# Patient Record
Sex: FEMALE | Race: WHITE | NOT HISPANIC OR LATINO | Employment: PART TIME | URBAN - METROPOLITAN AREA
[De-identification: names, ages, dates, MRNs, and addresses within clinical notes are randomized per-mention and may not be internally consistent; named-entity substitution may affect disease eponyms.]

---

## 2017-02-01 DIAGNOSIS — E03.4 HYPOTHYROIDISM DUE TO ACQUIRED ATROPHY OF THYROID: ICD-10-CM

## 2017-02-01 DIAGNOSIS — E03.9 HYPOTHYROIDISM IN PREGNANCY, FIRST TRIMESTER: ICD-10-CM

## 2017-02-01 DIAGNOSIS — O99.281 HYPOTHYROIDISM IN PREGNANCY, FIRST TRIMESTER: ICD-10-CM

## 2017-02-01 DIAGNOSIS — E03.9 HYPOTHYROIDISM IN PREGNANCY, SECOND TRIMESTER: ICD-10-CM

## 2017-02-01 DIAGNOSIS — O99.282 HYPOTHYROIDISM IN PREGNANCY, SECOND TRIMESTER: ICD-10-CM

## 2017-02-01 PROCEDURE — 84439 ASSAY OF FREE THYROXINE: CPT | Performed by: FAMILY MEDICINE

## 2017-02-01 PROCEDURE — 84443 ASSAY THYROID STIM HORMONE: CPT | Performed by: FAMILY MEDICINE

## 2017-02-01 PROCEDURE — 36415 COLL VENOUS BLD VENIPUNCTURE: CPT | Performed by: FAMILY MEDICINE

## 2017-02-02 LAB
T4 FREE SERPL-MCNC: 0.94 NG/DL (ref 0.76–1.46)
TSH SERPL DL<=0.05 MIU/L-ACNC: 0.87 MU/L (ref 0.4–4)

## 2017-02-09 NOTE — PROGRESS NOTES
Quick Note:    Bella,  Your thyroid levels look good, please continue levothyroxine 125 mcg/day.  We will probably need to go back to your previous, pre-pregnancy levothyroxine dose after you deliver.  Can you schedule an appointment with me before you have the baby to discuss the changes?  Let me know if that doesn't work for you.  Karolyn Martines NP  Endocrinology    ______

## 2017-02-27 DIAGNOSIS — E03.4 HYPOTHYROIDISM DUE TO ACQUIRED ATROPHY OF THYROID: ICD-10-CM

## 2017-02-27 RX ORDER — LEVOTHYROXINE SODIUM 125 UG/1
TABLET ORAL
Qty: 90 TABLET | Refills: 0 | Status: SHIPPED | OUTPATIENT
Start: 2017-02-27 | End: 2017-05-09 | Stop reason: DRUGHIGH

## 2017-02-27 NOTE — TELEPHONE ENCOUNTER
levothyroxine (SYNTHROID, LEVOTHROID) 125 MCG tablet     Last Written Prescription Date: 10/24/16  Last Quantity: 90, # refills: 0  Last Office Visit with DEBBIE, MANJEET or [facility] prescribing provider: Conrad 9/14/16        TSH   Date Value Ref Range Status   02/01/2017 0.87 0.40 - 4.00 mU/L Final

## 2017-02-27 NOTE — TELEPHONE ENCOUNTER
Karolyn-see below.  Per 9/14/16 note visit was due 12/14/16.  Please advise.  Shania Stanton RN      Entered by Karolyn Martines APRN CNP at 2/9/2017  5:28 AM   Read by Bella Scott at 2/9/2017  8:23 AM   Bella,   Your thyroid levels look good, please continue levothyroxine 125 mcg/day.   We will probably need to go back to your previous, pre-pregnancy levothyroxine dose after you deliver.   Can you schedule an appointment with me before you have the baby to discuss the changes?   Let me know if that doesn't work for you.   Karolyn Martines NP   Endocrinology        9/14/16  Follow-up:  3 months     Karolyn Martines NP  Endocrinology  [facility]  CC: Osmar Rendon

## 2017-02-27 NOTE — TELEPHONE ENCOUNTER
She still needs the 125 mcg dose for now because she is still pregnant.  I refilled the Rx for a 90-day supply.  Tell her to schedule a follow up appointment before the next refill, she should be about 6 weeks postpartum by then.  Thanks,  Karolyn Martines NP  Endocrinology

## 2017-02-27 NOTE — LETTER
[facility]  [address] [place], [address]  [phone]        February 27, 2017    Bella ALFRED Scott                                                                                                                                  [address]  [facility] [address]            We recently received a call from your pharmacy requesting a refill of Levothyroxine.     A review of your chart indicates that an appointment is required with your provider for follow-up thyroid per Radha Simmons prior to needing next refill. Please call the clinic at [phone] to schedule your appointment.     Taking care of your health is important to us and ongoing visits with your provider are vital to your care.  We look forward to seeing you in the near future.     Sincerely,     [facility]

## 2017-03-14 ENCOUNTER — ANESTHESIA (OUTPATIENT)
Dept: OBGYN | Facility: CLINIC | Age: 32
End: 2017-03-14
Payer: COMMERCIAL

## 2017-03-14 ENCOUNTER — ANESTHESIA EVENT (OUTPATIENT)
Dept: OBGYN | Facility: CLINIC | Age: 32
End: 2017-03-14
Payer: COMMERCIAL

## 2017-03-14 PROBLEM — Z98.891 S/P REPEAT LOW TRANSVERSE C-SECTION: Status: ACTIVE | Noted: 2017-03-14

## 2017-03-14 PROCEDURE — 25800025 ZZH RX 258: Performed by: OBSTETRICS & GYNECOLOGY

## 2017-03-14 PROCEDURE — 25000125 ZZHC RX 250

## 2017-03-14 PROCEDURE — 25000128 H RX IP 250 OP 636: Performed by: OBSTETRICS & GYNECOLOGY

## 2017-03-14 PROCEDURE — 25000125 ZZHC RX 250: Performed by: NURSE ANESTHETIST, CERTIFIED REGISTERED

## 2017-03-14 PROCEDURE — 25000128 H RX IP 250 OP 636: Performed by: NURSE ANESTHETIST, CERTIFIED REGISTERED

## 2017-03-14 RX ORDER — BUPIVACAINE HYDROCHLORIDE 7.5 MG/ML
INJECTION, SOLUTION INTRASPINAL PRN
Status: DISCONTINUED | OUTPATIENT
Start: 2017-03-14 | End: 2017-03-14

## 2017-03-14 RX ORDER — EPHEDRINE SULFATE 50 MG/ML
INJECTION, SOLUTION INTRAMUSCULAR; INTRAVENOUS; SUBCUTANEOUS PRN
Status: DISCONTINUED | OUTPATIENT
Start: 2017-03-14 | End: 2017-03-14

## 2017-03-14 RX ORDER — MORPHINE SULFATE 1 MG/ML
INJECTION, SOLUTION EPIDURAL; INTRATHECAL; INTRAVENOUS PRN
Status: DISCONTINUED | OUTPATIENT
Start: 2017-03-14 | End: 2017-03-14

## 2017-03-14 RX ADMIN — Medication: at 09:33

## 2017-03-14 RX ADMIN — Medication 5 MG: at 09:20

## 2017-03-14 RX ADMIN — Medication 5 MG: at 09:16

## 2017-03-14 RX ADMIN — PHENYLEPHRINE HYDROCHLORIDE 100 MCG: 10 INJECTION, SOLUTION INTRAMUSCULAR; INTRAVENOUS; SUBCUTANEOUS at 09:13

## 2017-03-14 RX ADMIN — Medication 5 MG: at 09:43

## 2017-03-14 RX ADMIN — PHENYLEPHRINE HYDROCHLORIDE 100 MCG: 10 INJECTION, SOLUTION INTRAMUSCULAR; INTRAVENOUS; SUBCUTANEOUS at 09:20

## 2017-03-14 RX ADMIN — PHENYLEPHRINE HYDROCHLORIDE 100 MCG: 10 INJECTION, SOLUTION INTRAMUSCULAR; INTRAVENOUS; SUBCUTANEOUS at 09:41

## 2017-03-14 RX ADMIN — FENTANYL CITRATE 20 MCG: 50 INJECTION, SOLUTION INTRAMUSCULAR; INTRAVENOUS at 09:13

## 2017-03-14 RX ADMIN — PHENYLEPHRINE HYDROCHLORIDE 100 MCG: 10 INJECTION, SOLUTION INTRAMUSCULAR; INTRAVENOUS; SUBCUTANEOUS at 09:37

## 2017-03-14 RX ADMIN — BUPIVACAINE HYDROCHLORIDE IN DEXTROSE 12 MG: 7.5 INJECTION, SOLUTION SUBARACHNOID at 09:13

## 2017-03-14 RX ADMIN — SODIUM CHLORIDE, POTASSIUM CHLORIDE, SODIUM LACTATE AND CALCIUM CHLORIDE: 600; 310; 30; 20 INJECTION, SOLUTION INTRAVENOUS at 09:41

## 2017-03-14 RX ADMIN — CEFAZOLIN SODIUM 2 G: 2 INJECTION, SOLUTION INTRAVENOUS at 09:16

## 2017-03-14 RX ADMIN — PHENYLEPHRINE HYDROCHLORIDE 100 MCG: 10 INJECTION, SOLUTION INTRAMUSCULAR; INTRAVENOUS; SUBCUTANEOUS at 09:16

## 2017-03-14 RX ADMIN — SODIUM CHLORIDE, POTASSIUM CHLORIDE, SODIUM LACTATE AND CALCIUM CHLORIDE: 600; 310; 30; 20 INJECTION, SOLUTION INTRAVENOUS at 09:01

## 2017-03-14 RX ADMIN — MORPHINE SULFATE 0.2 MG: 1 INJECTION EPIDURAL; INTRATHECAL; INTRAVENOUS at 09:13

## 2017-03-14 ASSESSMENT — LIFESTYLE VARIABLES: TOBACCO_USE: 0

## 2017-03-14 ASSESSMENT — COPD QUESTIONNAIRES: COPD: 0

## 2017-03-14 NOTE — ANESTHESIA PROCEDURE NOTES
Peripheral nerve/Neuraxial procedure note : intrathecal  Pre-Procedure  Performed by DUANE SANDOVAL  Location: OR      Pre-Anesthestic Checklist: patient identified, IV checked, risks and benefits discussed, informed consent, monitors and equipment checked and pre-op evaluation    Timeout  Correct Patient: Yes   Correct Procedure: Yes   Correct Site: Yes   Correct Laterality: N/A   Correct Position: Yes   Site Marked: N/A   .   Procedure Documentation    .    Procedure:    Intrathecal.  Insertion Site:L3-4  (midline approach)      Patient Prep;mask, sterile gloves, povidone-iodine 7.5% surgical scrub, patient draped.  .  Needle: (). # of attempts: 1. # of redirects:. Spinal Needle: Tamia tip 25 G. 3.5 in.  Introducer used. . .     Assessment/Narrative  Paresthesias: No.  .  .  clear CSF fluid removed . Comments:  No pain with injection, questions answered about procedure.

## 2017-03-14 NOTE — ANESTHESIA POSTPROCEDURE EVALUATION
Patient: Bella Scott    Procedure(s):  REPEAT  SECTION  - Wound Class: I-Clean    Diagnosis:REPEAT   Diagnosis Additional Information: No value filed.    Anesthesia Type:  Spinal    Note:  Anesthesia Post Evaluation    Patient location during evaluation: PACU  Patient participation: Able to fully participate in evaluation  Level of consciousness: awake and alert  Pain management: adequate  Airway patency: patent  Cardiovascular status: acceptable  Respiratory status: acceptable  Hydration status: acceptable  PONV: none     Anesthetic complications: None          Last vitals:  Vitals:    17 1145 17 1220 17 1245   BP: 120/83 117/69 118/68   Pulse:   82   Resp: 16 16 16   Temp:  36.4  C (97.6  F)    SpO2: 97% 99% 99%         Electronically Signed By: Elvis Moya MD  2017  1:30 PM

## 2017-03-14 NOTE — ANESTHESIA PREPROCEDURE EVALUATION
Anesthesia Evaluation     . Pt has had prior anesthetic. Type: Regional    No history of anesthetic complications     ROS/MED HX    ENT/Pulmonary:      (-) tobacco use, asthma, COPD and sleep apnea   Neurologic:       Cardiovascular:        (-) hypertension and CAD   METS/Exercise Tolerance:     Hematologic:         Musculoskeletal:         GI/Hepatic:        (-) GERD and liver disease   Renal/Genitourinary:      (-) renal disease   Endo:     (+) thyroid problem hypothyroidism, .   (-) Type I DM and Type II DM   Psychiatric:         Infectious Disease:         Malignancy:         Other:               Physical Exam  Normal systems: cardiovascular, pulmonary and dental    Airway   Mallampati: I  TM distance: >3 FB  Neck ROM: full    Dental     Cardiovascular       Pulmonary                     Anesthesia Plan      History & Physical Review  History and physical reviewed and following examination; no interval change.    ASA Status:  2 .    NPO Status:  > 8 hours    Plan for Spinal   PONV prophylaxis:  Ondansetron (or other 5HT-3)       Postoperative Care      Consents  Anesthetic plan, risks, benefits and alternatives discussed with:  Patient..                          .

## 2017-03-14 NOTE — ANESTHESIA CARE TRANSFER NOTE
Patient: Bella Scott    Procedure(s):  REPEAT  SECTION  - Wound Class: I-Clean    Diagnosis: REPEAT   Diagnosis Additional Information: No value filed.    Anesthesia Type:   Spinal     Note:  Airway :Room Air  Patient transferred to:PACU  Comments: Awake, alert, VSS, report to RN, monitors and alarms on, IV patent.      Vitals: (Last set prior to Anesthesia Care Transfer)    CRNA VITALS  3/14/2017 0921 - 3/14/2017 0958      3/14/2017             Resp Rate (set): 10                Electronically Signed By: CECILIA Barney CRNA  2017  9:58 AM

## 2017-05-05 ENCOUNTER — OFFICE VISIT (OUTPATIENT)
Dept: ENDOCRINOLOGY | Facility: CLINIC | Age: 32
End: 2017-05-05
Payer: COMMERCIAL

## 2017-05-05 VITALS
HEART RATE: 91 BPM | BODY MASS INDEX: 32.78 KG/M2 | HEIGHT: 68 IN | SYSTOLIC BLOOD PRESSURE: 111 MMHG | WEIGHT: 216.3 LBS | DIASTOLIC BLOOD PRESSURE: 77 MMHG | OXYGEN SATURATION: 96 % | RESPIRATION RATE: 20 BRPM | TEMPERATURE: 99.1 F

## 2017-05-05 DIAGNOSIS — Z13.6 CARDIOVASCULAR SCREENING; LDL GOAL LESS THAN 160: ICD-10-CM

## 2017-05-05 DIAGNOSIS — E03.4 HYPOTHYROIDISM DUE TO ACQUIRED ATROPHY OF THYROID: Primary | ICD-10-CM

## 2017-05-05 DIAGNOSIS — D50.8 OTHER IRON DEFICIENCY ANEMIA: ICD-10-CM

## 2017-05-05 DIAGNOSIS — E55.9 VITAMIN D DEFICIENCY: ICD-10-CM

## 2017-05-05 DIAGNOSIS — E04.1 THYROID NODULE: ICD-10-CM

## 2017-05-05 LAB
ERYTHROCYTE [DISTWIDTH] IN BLOOD BY AUTOMATED COUNT: 13.4 % (ref 10–15)
FERRITIN SERPL-MCNC: 71 NG/ML (ref 12–150)
HCT VFR BLD AUTO: 42.1 % (ref 35–47)
HGB BLD-MCNC: 14.3 G/DL (ref 11.7–15.7)
MCH RBC QN AUTO: 28.8 PG (ref 26.5–33)
MCHC RBC AUTO-ENTMCNC: 34 G/DL (ref 31.5–36.5)
MCV RBC AUTO: 85 FL (ref 78–100)
PLATELET # BLD AUTO: 273 10E9/L (ref 150–450)
RBC # BLD AUTO: 4.97 10E12/L (ref 3.8–5.2)
WBC # BLD AUTO: 10.7 10E9/L (ref 4–11)

## 2017-05-05 PROCEDURE — 84443 ASSAY THYROID STIM HORMONE: CPT | Performed by: CLINICAL NURSE SPECIALIST

## 2017-05-05 PROCEDURE — 82728 ASSAY OF FERRITIN: CPT | Performed by: CLINICAL NURSE SPECIALIST

## 2017-05-05 PROCEDURE — 82306 VITAMIN D 25 HYDROXY: CPT | Performed by: CLINICAL NURSE SPECIALIST

## 2017-05-05 PROCEDURE — 84439 ASSAY OF FREE THYROXINE: CPT | Performed by: CLINICAL NURSE SPECIALIST

## 2017-05-05 PROCEDURE — 99214 OFFICE O/P EST MOD 30 MIN: CPT | Performed by: CLINICAL NURSE SPECIALIST

## 2017-05-05 PROCEDURE — 85027 COMPLETE CBC AUTOMATED: CPT | Performed by: CLINICAL NURSE SPECIALIST

## 2017-05-05 PROCEDURE — 36415 COLL VENOUS BLD VENIPUNCTURE: CPT | Performed by: CLINICAL NURSE SPECIALIST

## 2017-05-05 PROCEDURE — 80061 LIPID PANEL: CPT | Performed by: CLINICAL NURSE SPECIALIST

## 2017-05-05 NOTE — MR AVS SNAPSHOT
After Visit Summary   5/5/2017    Bella Scott    MRN: [id number]           Patient Information     Date Of Birth          1985        Visit Information        Provider Department      5/5/2017 11:30 AM Karolyn Martines APRN CNP [facility]        Today's Diagnoses     Hypothyroidism due to acquired atrophy of thyroid    -  1    Thyroid nodule        CARDIOVASCULAR SCREENING; LDL GOAL LESS THAN 160        Other iron deficiency anemia        Vitamin D deficiency           Follow-ups after your visit        Future tests that were ordered for you today     Open Future Orders        Priority Expected Expires Ordered    US Thyroid Routine  5/5/2018 5/5/2017            Who to contact     If you have questions or need follow up information about today's clinic visit or your schedule please contact [facility] directly at [phone].  Normal or non-critical lab and imaging results will be communicated to you by [employer]hart, letter or phone within 4 business days after the clinic has received the results. If you do not hear from us within 7 days, please contact the clinic through MyChart or phone. If you have a critical or abnormal lab result, we will notify you by phone as soon as possible.  Submit refill requests through Super or call your pharmacy and they will forward the refill request to us. Please allow 3 business days for your refill to be completed.          Additional Information About Your Visit        MyChart Information     Super gives you secure access to your electronic health record. If you see a primary care provider, you can also send messages to your care team and make appointments. If you have questions, please call your primary care clinic.  If you do not have a primary care provider, please call [phone] and they will assist you.        Care EveryWhere ID     This is your Care EveryWhere ID. This could be used by other organizations to  "access your [place] medical records  [id number]        Your Vitals Were     Pulse Temperature Respirations Height Last Period Pulse Oximetry    91 99.1  F (37.3  C) (Oral) 20 1.727 m (5' 8\") 06/11/2016 96%    Breastfeeding? BMI (Body Mass Index)                Yes 32.89 kg/m2           Blood Pressure from Last 3 Encounters:   05/05/17 111/77   03/17/17 133/87   09/14/16 121/72    Weight from Last 3 Encounters:   05/05/17 98.1 kg (216 lb 4.8 oz)   03/14/17 112 kg (247 lb)   09/14/16 90.7 kg (200 lb)              We Performed the Following     CBC with platelets     Ferritin     Lipid panel reflex to direct LDL     T4 FREE     TSH     Vitamin D Deficiency        Primary Care Provider Office Phone # Fax #    Osmar Rendon PA-C [phone]       [place] [address]  [facility] [address]        Thank you!     Thank you for choosing [facility]  for your care. Our goal is always to provide you with excellent care. Hearing back from our patients is one way we can continue to improve our services. Please take a few minutes to complete the written survey that you may receive in the mail after your visit with us. Thank you!             Your Updated Medication List - Protect others around you: Learn how to safely use, store and throw away your medicines at www.disposemymeds.org.          This list is accurate as of: 5/5/17 12:23 PM.  Always use your most recent med list.                   Brand Name Dispense Instructions for use    levothyroxine 125 MCG tablet    SYNTHROID/LEVOTHROID    90 tablet    Take one tablet daily       oxyCODONE 5 MG IR tablet    ROXICODONE    20 tablet    Take 1-2 tablets (5-10 mg) by mouth every 3 hours as needed for moderate to severe pain       PRENATABS FA Tabs      Take  by mouth.       VITAMIN D (CHOLECALCIFEROL) PO      Take 2,000 Units by mouth daily         "

## 2017-05-05 NOTE — PROGRESS NOTES
Name: Bella Scott  F/u for thyroid nodule (Last seen 2016).   HPI:  Bella Scott is a 30 year old female who presents for the follow up of hypothyroidism and thyroid nodule.  Was diagnosed with hypothyroidism in .      Currently treated with Levothyroxine 125 mcg/day.    : 3/14/2017. - baby boy.  Also has 3 year old daughter    Was also noted to have two thyroid nodules on ultrasound, 2.5 cm right nodule and a 1.4 cm isthmus nodule.  Both were previously biopsied in , right nodule was benign, isthmus nodule biopsy was unsatisfactory and showed predominantly lymphoid cells. Repeat thyroid ultrasound - isthmus nodule stable in size, right nodule decreased in size compared to previous ultrasound.    Was taking levo qod up until two weeks ago, has been taking daily the past 2 weeks.  Denies any hyperthyroid symptoms.    Works as a nurse, [employer]  PMH/PSH:  Past Medical History:   Diagnosis Date     Anemia, unspecified 3/24/2003    iron deficiney per pt     Contraception      Unspecified hypothyroidism      Past Surgical History:   Procedure Laterality Date      SECTION  2014    Procedure:  SECTION;;  Surgeon: Lisa Steele MD;  Location: [facility]      SECTION N/A 3/14/2017    Procedure:  SECTION;  Surgeon: Lucie Jackson MD;  Location: [facility]     HC EXC BENIGN SKIN LESION SCLP/NCK/HNDS/FEET/GEN 0.6-1.0 CM  08    RT Thumb     HC REPAIR OF NAIL BED  08    RT Thumb-benign     Family Hx:  Family History   Problem Relation Age of Onset     Thyroid Disease Mother      Family History Negative Father      CANCER Maternal Grandmother      Ovarian Cancer in her late 50s     DIABETES Maternal Grandfather      kidney transplant -diabetes related     Other - See Comments Paternal Grandmother      PVD and uncle on paternal side      HEART DISEASE Paternal Grandfather      MI-at age of 60's     Thyroid Disease Sister      1-underactive in thyroid,  "not cancerous      Family History Negative Brother      1     Breast Cancer No family hx of      CEREBROVASCULAR DISEASE No family hx of      Cancer - colorectal No family hx of      Thyroid disease:         DM2:          Autoimmune: DM1, SLE, RA, Vitiligo     Social Hx:  Social History     Social History     Marital status:      Spouse name: N/A     Number of children: 0     Years of education: 16     Occupational History     student      [employer]     Social History Main Topics     Smoking status: Never Smoker     Smokeless tobacco: Never Used      Comment: no 2nd hand smoke at home     Alcohol use 0.0 oz/week     0 Standard drinks or equivalent per week      Comment: once a month     Drug use: No     Sexual activity: Yes     Partners: Male     Birth control/ protection: Condom     Other Topics Concern      Service No     Blood Transfusions No     Caffeine Concern Yes     1 soda per day     Sleep Concern No     Stress Concern No     Weight Concern No     Special Diet No     Exercise Yes     Seat Belt Yes     Self-Exams No     once in a while     Parent/Sibling W/ Cabg, Mi Or Angioplasty Before 65f 55m? Yes     paternal grandmother, multiple bypass     Social History Narrative     2/10 , working fulltime RN at [employer], no kids, no pets          MEDICATIONS:  has a current medication list which includes the following prescription(s): cholecalciferol, levothyroxine, prenatabs fa, and oxycodone, and the following Facility-Administered Medications: fentanyl, ropivacaine, and lidocaine-epinephrine.    Review of Systems  10 point ROS neg other than the symptoms noted above in the HPI.    Physical Exam   VS: /77 (BP Location: Left arm, Patient Position: Chair, Cuff Size: Adult Large)  Pulse 91  Temp 99.1  F (37.3  C) (Oral)  Resp 20  Ht 1.727 m (5' 8\")  Wt 98.1 kg (216 lb 4.8 oz)  LMP 06/11/2016  SpO2 96%  Breastfeeding? Yes  BMI 32.89 kg/m2  GENERAL: AXOX3, NAD, well " dressed, answering questions appropriately, appears stated age.  HEENT: no exophthalmos, no proptosis, EOMI, no lig lag, no retraction  NECK: Supple, thyroid slightly enlarged, right thyroid and isthmus nodules palpable, both are smooth, firm, and nontender, no adenopathy  CV: RRR, no rubs, gallops, no murmurs  LUNGS: normal respirator effort  EXTREMITIES: grossly normal  NEUROLOGY: CN grossly intact, no tremors  MSK: grossly intact  SKIN: no apparent rashes or lesions    LABS:  TFTs:  !THYROID Latest Ref Rng & Units 2/1/2017 12/23/2016 11/22/2016   TSH 0.40 - 4.00 mU/L 0.87 1.46 2.44   T4 FREE 0.76 - 1.46 ng/dL 0.94 0.93 1.02       Thyroid Ultrasound:  US THYROID Apr 26, 2010      HISTORY: Enlarged thyroid.     COMPARISON: None.    FINDINGS: Right thyroid measures 6.0 x 2.1 x 2.1 cm. Left thyroid  measures 4.7 x 1.6 x 1.6 cm.    The thyroid parenchyma bilaterally has a heterogeneous echotexture.    Thyroid nodules are as follows:  1. 2.5 x 2.1 x 1.4 cm right mid to lower solid thyroid nodule that  appears mildly echogenic compared to the surrounding right thyroid.  2. 1.4 x 1.1 x 0.4 cm hypoechoic focus along the inferior edge of the  thyroid isthmus either representing an adjacent lymph node, versus  hypoechoic thyroid nodule.    IMPRESSION:  1. Heterogeneous thyroid parenchyma bilaterally is an imaging finding  that can be seen with thyroiditis.  2. Two thyroid nodular lesions identified, as above. The isthmus  lesion may either represent a thyroid nodule versus adjacent lymph  node.    ULTRASOUND THYROID July 7, 2014   HISTORY: Goiter.  COMPARISON: 7/15/2013.  FINDINGS: The right lobe of the thyroid measures 5.7 x 2.1 x 1.8 cm  compared to 5.5 x 2.0 x 1.9 cm on the prior study. The left lobe of  the thyroid measures 5.2 x 1.6 x 1.6 cm. The isthmus is 0.4 cm in  thickness. It previously measured 0.2 cm in thickness. Both lobes of  the thyroid are heterogeneous.  In the medial right lower pole there is a 1.9 x 1.4  x 0.7 cm isoechoic  nodule that previously measured 2.0 x 0.8 x 1.5 cm.  In the inferior isthmus there is a 1.1 x 1.0 x 0.3 cm hypoechoic  nodule that previously measured 1.5 x 0.4 x 1.1 cm.  IMPRESSION  IMPRESSION: No change or increase in size of nodular densities in the  right lower pole and isthmus. Diffusely inhomogeneous thyroid.      FNA biopsy 6/10/2010:    CYTOLOGIC INTERPRETATION:    A. FNA-thyroid, right: Negative for Malignancy  Consistent with benign nodule.  Specimen Adequacy: Satisfactory for evaluation.    B. FNA-thyroid isthmus: Description:  Specimen Adequacy: Unsatisfactory for evaluation, due to:  ...not representative of intended site.    GROSS:  A. FNA-thyroid, right: 7 fixed smears received    B. FNA-thyroid isthmus: 12 fixed smears received    MICROSCOPIC:  A. Seven Papanicolaou stained direct smears were examined. The smears  are adequate for evaluation. They are hypocellular and features groups  of follicular epithelial cells and small to moderate amount of colloid.  There are no cytologic features of papillary carcinoma. The lesion is  classified as negative, consistent with benign nodule.    B. Twelve Papanicolaou stained direct smears were examined. The smears  are affected by areas if drying artifact. The smears show predominantly  lymphoid cells. The lymphocytes are small and relatively polymorphic.  No follicular epithelial cells were identified.    The differential diagnosis includes sampling of intra- or perithyroidal  lymph node and chronic thyroiditis. Considering the age of the patient,  a chronic lymphoproliferative disorder is unlikely. However, in the  absence of flow cytometry/immunologic data, this possibility cannot be  entirely excluded. Correlation with clinical, radiologic and serologic  findings is recommended.    ULTRASOUND THYROID 3/14/2016    COMPARISON: 7/7/2014.     FINDINGS: The right lobe of the thyroid measures 5.1 x 1.9 x 1.5 cm.  The left lobe of the thyroid  measures 5 x 1.5 x 1.5 cm. The isthmus  measures 0.4 cm. Background thyroid parenchymal echogenicity is  heterogeneous.       Individual thyroid nodules are measured as follows:    1. Slightly hyperechoic nodule in the inferior right thyroid lobe has  decreased slightly in size, measuring 1.6 x 1.4 x 0.7 cm (previously  1.9 x 1.4 x 0.7 cm).  2. Isoechoic nodule along the anterior aspect of the isthmus is  unchanged, measuring 1.1 x 1 x 0.3 cm.      IMPRESSION: Two thyroid nodules are again noted, with the largest on  the right having decreased slightly in size.    All pertinent notes, labs, and images personally reviewed by me.     A/P  Ms.Tami ALFRED Scott is a 30 year old here for the evaluation of thyroid nodules and hypothyroidism:    1.  Hypothyroidism.  Currently treated with Levothyroxine 125 mcg daily.  Obtain TFT's today, adjust dose if indicated.    2.  Thyroid nodules.  Repeat thyroid ultrasound 1 year ago stable, right nodule decreased in size compared to previous ultrasound - ? If isthmus nodule is an intrathyroidal lymph node but the nodule has not increased in size so need to rebiopsy at this time.  Repeat thyroid ultrasound.    Thyroid nodules are common and are frequently benign. Data suggest that the prevalence of palpable thyroid nodules is 3% to 7% in [place]; the prevalence is as high as 50% based on ultrasonography (US) or autopsy data. All patients with a palpable thyroid nodule, however, should undergo US examination. US-guided FNA (US-FNA) is recommended for nodules ?10 mm; US-FNA is suggested for nodules <10 mm only if clinical information or US features are suspicious.    Causes of thyroid Nodules: Benign (Multinodular goiter, Hashimoto s thyroiditis, Simple or hemorrhagic cysts, Follicular adenomas, Subacute thyroiditis) or Malignant(Papillary carcinoma, Follicular carcinoma, Hürthle cell carcinoma, Medullary carcinoma, Anaplastic carcinoma, Primary thyroid lymphoma, or Metastatic  malignant lesion).    MultiNodule:  The risk of cancer is not significantly higher in palpable solitary thyroid nodules than in multinodular lesions or in nodules in diffuse goiters. In multinodular thyroid glands, the cytologic sampling should be focused on lesions characterized by suspicious US features rather than on larger or clinically dominant nodules.    Cyst:  Most complex thyroid nodules with a dominant fluid component are benign. USFNA, however, should always be performed because the rare papillary thyroid carcinoma (PTC) can be cystic. An unsatisfactory (nondiagnostic) specimen usually results from a cystic nodule that yields few or no follicular cells. Reaspiration yields satisfactory results in 50% of cases.    Fine-Needle Aspiration Cytologic Diagnosis: 70% of FNA specimens are classified as benign; in addition, 5% are malignant, 10% are suspicious, and 10% to 20% are nondiagnostic or unsatisfactory. At surgical intervention, about 20% of such indeterminate/suspicious specimens are found to be malignant lesions.    Despite good initial technique, repeated biopsy, and US-FNA, approximately 5% of nodules still remain nondiagnostic. Such thyroid nodules should be surgically excised.    3.  Vitamin D deficiency.  Currently treated with vitamin D 2000 IU daily.  Will obtain D level today.  Recommend she discuss D replacement recommendations during breastfeeding with her OBGYN or pediatrician.      4.  Iron deficient anemia.  Obtain CBC and ferritin.  Further treatment and/or monitoring per her PCP.    Plans to establish with a new PCP.    Labs ordered today:   Orders Placed This Encounter   Procedures     US Thyroid     TSH     T4 FREE     Lipid panel reflex to direct LDL     CBC with platelets     Ferritin     Vitamin D Deficiency     Radiology/Consults ordered today: US THYROID    More than 50% of the time spent with Ms. Scott on counseling / coordinating her care.Total face to face time was greater  than or equal to 25 minutes.      Follow-up:  To be determined based on lab results    Karolyn Martines NP  Endocrinology  [facility]  CC:

## 2017-05-05 NOTE — NURSING NOTE
"Chief Complaint   Patient presents with     Thyroid Disease     follow up     Depression     post partum depression       Initial /77 (BP Location: Left arm, Patient Position: Chair, Cuff Size: Adult Large)  Pulse 91  Temp 99.1  F (37.3  C) (Oral)  Resp 20  Ht 1.727 m (5' 8\")  Wt 98.1 kg (216 lb 4.8 oz)  LMP 06/11/2016  SpO2 96%  Breastfeeding? Yes  BMI 32.89 kg/m2 Estimated body mass index is 32.89 kg/(m^2) as calculated from the following:    Height as of this encounter: 1.727 m (5' 8\").    Weight as of this encounter: 98.1 kg (216 lb 4.8 oz).  Medication Reconciliation: incomplete   Saray Jones CMA ([facility])      "

## 2017-05-05 NOTE — PROGRESS NOTES
SUBJECTIVE:                                                    Bella Scott is a 31 year old female who presents to clinic today for the following health issues:

## 2017-05-06 LAB
CHOLEST SERPL-MCNC: 301 MG/DL
HDLC SERPL-MCNC: 42 MG/DL
LDLC SERPL CALC-MCNC: 220 MG/DL
NONHDLC SERPL-MCNC: 259 MG/DL
T4 FREE SERPL-MCNC: 1.3 NG/DL (ref 0.76–1.46)
TRIGL SERPL-MCNC: 197 MG/DL
TSH SERPL DL<=0.05 MIU/L-ACNC: 0.11 MU/L (ref 0.4–4)

## 2017-05-06 ASSESSMENT — PATIENT HEALTH QUESTIONNAIRE - PHQ9: SUM OF ALL RESPONSES TO PHQ QUESTIONS 1-9: 6

## 2017-05-08 LAB — DEPRECATED CALCIDIOL+CALCIFEROL SERPL-MC: 35 UG/L (ref 20–75)

## 2017-05-09 RX ORDER — LEVOTHYROXINE SODIUM 112 UG/1
112 TABLET ORAL DAILY
Qty: 90 TABLET | Refills: 1 | Status: SHIPPED | OUTPATIENT
Start: 2017-05-09 | End: 2017-11-03

## 2017-05-09 NOTE — PROGRESS NOTES
Hi Bella,  Your TSH is already below normal so I think the levothyroxine 125 mcg dose is too much.  I am decreasing your dose to 112 mcg per day.  I'll place lab orders so you can recheck thyroid levels in 6-8 weeks.  Your vitamin d level is normal.  I would check with the pediatrician regarding D replacement recommendations while breastfeeding.  Let me know if you have any questions.  Karolyn Martines NP  Endocrinology

## 2017-08-02 ENCOUNTER — OFFICE VISIT (OUTPATIENT)
Dept: FAMILY MEDICINE | Facility: CLINIC | Age: 32
End: 2017-08-02
Payer: COMMERCIAL

## 2017-08-02 VITALS
HEART RATE: 76 BPM | DIASTOLIC BLOOD PRESSURE: 68 MMHG | SYSTOLIC BLOOD PRESSURE: 120 MMHG | BODY MASS INDEX: 33.6 KG/M2 | WEIGHT: 221 LBS | RESPIRATION RATE: 16 BRPM | TEMPERATURE: 97.7 F

## 2017-08-02 DIAGNOSIS — D17.30 LIPOMA OF SKIN AND SUBCUTANEOUS TISSUE: Primary | ICD-10-CM

## 2017-08-02 DIAGNOSIS — E03.4 HYPOTHYROIDISM DUE TO ACQUIRED ATROPHY OF THYROID: ICD-10-CM

## 2017-08-02 LAB — TSH SERPL DL<=0.005 MIU/L-ACNC: 1.51 MU/L (ref 0.4–4)

## 2017-08-02 PROCEDURE — 84443 ASSAY THYROID STIM HORMONE: CPT | Performed by: FAMILY MEDICINE

## 2017-08-02 PROCEDURE — 99214 OFFICE O/P EST MOD 30 MIN: CPT | Performed by: FAMILY MEDICINE

## 2017-08-02 PROCEDURE — 36415 COLL VENOUS BLD VENIPUNCTURE: CPT | Performed by: FAMILY MEDICINE

## 2017-08-02 ASSESSMENT — ENCOUNTER SYMPTOMS
NECK PAIN: 1
TINGLING: 1
SENSORY CHANGE: 0
CONSTITUTIONAL NEGATIVE: 1
FOCAL WEAKNESS: 0

## 2017-08-02 NOTE — NURSING NOTE
"Chief Complaint   Patient presents with     Neck Pain     Shoulder Pain       Initial /68 (BP Location: Right arm, Cuff Size: Adult Large)  Pulse 76  Temp 97.7  F (36.5  C) (Oral)  Resp 16  Wt 221 lb (100.2 kg)  BMI 33.6 kg/m2 Estimated body mass index is 33.6 kg/(m^2) as calculated from the following:    Height as of 5/5/17: 5' 8\" (1.727 m).    Weight as of this encounter: 221 lb (100.2 kg).  Medication Reconciliation: complete   Germaine Colon MA    "

## 2017-08-02 NOTE — MR AVS SNAPSHOT
After Visit Summary   8/2/2017    Belal Scott    MRN: [id number]           Patient Information     Date Of Birth          1985        Visit Information        Provider Department      8/2/2017 10:20 AM Moreno Zhong MD [facility]        Today's Diagnoses     Lipoma of skin and subcutaneous tissue    -  1       Follow-ups after your visit        Additional Services     GENERAL SURG ADULT REFERRAL       Your provider has referred you to: [facility]: [facility] [place] [phone]   [email or web address]      Please be aware that coverage of these services is subject to the terms and limitations of your health insurance plan.  Call member services at your health plan with any benefit or coverage questions.      Please bring the following with you to your appointment:    (1) Any X-Rays, CTs or MRIs which have been performed.  Contact the facility where they were done to arrange for  prior to your scheduled appointment.   (2) List of current medications   (3) This referral request   (4) Any documents/labs given to you for this referral                  Follow-up notes from your care team     Return in about 1 month (around 9/2/2017), or if symptoms worsen or fail to improve.      Your next 10 appointments already scheduled     Aug 04, 2017  9:00 AM CDT   US THYROID with [email or web address]   [facility] ([facility])    [address] [address] [phone]           Please bring a list of your medicines (including vitamins, minerals and over-the-counter drugs). Also, tell your doctor about any allergies you may have. Wear comfortable clothes and leave your valuables at home.  You do not need to do anything special to prepare for your exam.  Please call the Imaging Department at your exam site with any questions.              Who to contact     If you  have questions or need follow up information about today's clinic visit or your schedule please contact [facility] directly at [phone].  Normal or non-critical lab and imaging results will be communicated to you by MyChart, letter or phone within 4 business days after the clinic has received the results. If you do not hear from us within 7 days, please contact the clinic through [facility] or phone. If you have a critical or abnormal lab result, we will notify you by phone as soon as possible.  Submit refill requests through [employer] or call your pharmacy and they will forward the refill request to us. Please allow 3 business days for your refill to be completed.          Additional Information About Your Visit        [facility] [place] Information     [employer] gives you secure access to your electronic health record. If you see a primary care provider, you can also send messages to your care team and make appointments. If you have questions, please call your primary care clinic.  If you do not have a primary care provider, please call [phone] and they will assist you.        Care EveryWhere ID     This is your Care EveryWhere ID. This could be used by other organizations to access your [place] medical records  [id number]        Your Vitals Were     Pulse Temperature Respirations BMI (Body Mass Index)          76 97.7  F (36.5  C) (Oral) 16 33.6 kg/m2         Blood Pressure from Last 3 Encounters:   08/02/17 120/68   05/05/17 111/77   03/17/17 133/87    Weight from Last 3 Encounters:   08/02/17 221 lb (100.2 kg)   05/05/17 216 lb 4.8 oz (98.1 kg)   03/14/17 247 lb (112 kg)              We Performed the Following     GENERAL SURG ADULT REFERRAL        Primary Care Provider Office Phone # Fax Mathew Rendon PA-C [phone]       [facility] [address]  [facility] [address]        Equal Access to Services     MISBAH DUARTE AH: Tina Raphael,  osmin larsen, qaybta kaaubrey ledesma, kaila benavides jaylaisha laKarolaavalentin ah. So [facility] [phone].    ATENCIÓN: Si lamont james, tiene a guallpa disposición servicios gratuitos de asistencia lingüística. Roque al [phone].    We comply with applicable federal civil rights laws and [place] laws. We do not discriminate on the basis of race, color, national origin, age, disability sex, sexual orientation or gender identity.            Thank you!     Thank you for choosing [facility]  for your care. Our goal is always to provide you with excellent care. Hearing back from our patients is one way we can continue to improve our services. Please take a few minutes to complete the written survey that you may receive in the mail after your visit with us. Thank you!             Your Updated Medication List - Protect others around you: Learn how to safely use, store and throw away your medicines at www.disposemymeds.org.          This list is accurate as of: 8/2/17 10:53 AM.  Always use your most recent med list.                   Brand Name Dispense Instructions for use Diagnosis    levothyroxine 112 MCG tablet    SYNTHROID/LEVOTHROID    90 tablet    Take 1 tablet (112 mcg) by mouth daily    Hypothyroidism due to acquired atrophy of thyroid       PRENATABS FA Tabs      Take  by mouth.        sertraline 50 MG tablet    ZOLOFT          VITAMIN D (CHOLECALCIFEROL) PO      Take 2,000 Units by mouth daily

## 2017-08-02 NOTE — PROGRESS NOTES
HPI    SUBJECTIVE:                                                    Bella Scott is a 31 year old female who presents to clinic today for the following health issues:      Neck Pain      Duration: Started about 10 days ago     Description:  Location: right sided neck pain   Radiation: into the right shoulder, into the right forearm and into the right hand    Intensity:  moderate    Accompanying signs and symptoms: Sometimes arm has numbness and tingling     History (similar episodes/previous evaluation): lypoma on right shoulder     Precipitating or alleviating factors: None    Therapies tried and outcome: tylenol and ibuprofen     Feels pain is linked to lipoma on front of R shoulder.  Worse at the end of the day, has  she carries a lot and notes carrying him for any length of time causes pain.  When bad will radiate into neck.  No weakness or loss of coordination in hand.  No injury, change in activity level.  L handed.      Review of Systems   Constitutional: Negative.    Musculoskeletal: Positive for joint pain and neck pain.   Neurological: Positive for tingling. Negative for sensory change and focal weakness.         Physical Exam   Constitutional: She is well-developed, well-nourished, and in no distress.   Musculoskeletal:        Right shoulder: She exhibits deformity. She exhibits normal range of motion, no tenderness, no swelling and no crepitus.        Arms:  Neurological:   Reflex Scores:       Tricep reflexes are 2+ on the right side.       Bicep reflexes are 2+ on the right side.       Brachioradialis reflexes are 2+ on the right side.  Skin: Skin is warm and dry.   Vitals reviewed.      (D17.30) Lipoma of skin and subcutaneous tissue  (primary encounter diagnosis)  Comment: given neuro type sx will refer to surg  Plan: GENERAL SURG ADULT REFERRAL              RTC in 1m prvalentin Zhong MD

## 2017-08-03 ENCOUNTER — TELEPHONE (OUTPATIENT)
Dept: SURGERY | Facility: CLINIC | Age: 32
End: 2017-08-03

## 2017-08-03 NOTE — TELEPHONE ENCOUNTER
Referral received from Moreno Zhong  for Lipoma.    Attempt #1:    Called patient at [phone] .  No answer - left message for patient to return call to clinic at [phone].  Gilda

## 2017-08-04 ENCOUNTER — HOSPITAL ENCOUNTER (OUTPATIENT)
Dept: ULTRASOUND IMAGING | Facility: CLINIC | Age: 32
Discharge: HOME | End: 2017-08-04
Attending: CLINICAL NURSE SPECIALIST | Admitting: CLINICAL NURSE SPECIALIST
Payer: COMMERCIAL

## 2017-08-04 DIAGNOSIS — E03.4 HYPOTHYROIDISM DUE TO ACQUIRED ATROPHY OF THYROID: ICD-10-CM

## 2017-08-04 DIAGNOSIS — E55.9 VITAMIN D DEFICIENCY: ICD-10-CM

## 2017-08-04 DIAGNOSIS — Z13.6 CARDIOVASCULAR SCREENING; LDL GOAL LESS THAN 160: ICD-10-CM

## 2017-08-04 DIAGNOSIS — D50.8 OTHER IRON DEFICIENCY ANEMIA: ICD-10-CM

## 2017-08-04 DIAGNOSIS — E04.1 THYROID NODULE: ICD-10-CM

## 2017-08-04 PROCEDURE — 76536 US EXAM OF HEAD AND NECK: CPT

## 2017-08-05 NOTE — PROGRESS NOTES
Bella,  Your thyroid ultrasound is stable compared to the previous ultrasound.  Please schedule a follow up appointment with me in 3 months for a recheck since we changed your thyroid replacement dose.  Let me know if you have any questions.  Karolyn Martines NP  Endocrinology

## 2017-08-09 NOTE — TELEPHONE ENCOUNTER
Attempt #2:    Called patient at [phone].  No answer - left message for patient to return call to clinic at [phone].  Gilda

## 2017-11-03 DIAGNOSIS — E03.4 HYPOTHYROIDISM DUE TO ACQUIRED ATROPHY OF THYROID: ICD-10-CM

## 2017-11-03 RX ORDER — LEVOTHYROXINE SODIUM 112 UG/1
TABLET ORAL
Qty: 90 TABLET | Refills: 0 | Status: SHIPPED | OUTPATIENT
Start: 2017-11-03 | End: 2017-11-14

## 2017-11-03 NOTE — TELEPHONE ENCOUNTER
Please let Bella know I refilled her levothyroxine but I would like her to at least make a lab appointment to recheck thyroid levels since we changed the dose.    I placed lab orders.  I'll let her know results when available.  Karolyn Martines NP  Endocrinology

## 2017-11-03 NOTE — LETTER
[facility]  [address] [place], [place], [address]  [phone]        November 3, 2017    Bella SIMON Tyler                                                                                                                                  [address]  [facility] [address]            We recently received a call from your pharmacy requesting a refill of Levothyroxine.     A review of your chart indicates that an appointment is required with your provider for 3 month recheck post thyroid ultrasound per Radha Simmons. Please call the clinic at [phone] to schedule your appointment.     Taking care of your health is important to us and ongoing visits with your provider are vital to your care.  We look forward to seeing you in the near future.     Sincerely,     [facility]

## 2017-11-03 NOTE — TELEPHONE ENCOUNTER
Due for visit.  Letter sent.  Sent to provider.  Please advise#30 or #90?  Shania Stanton RN    Entered by Karolyn Martines APRN CNP at 8/5/2017  8:06 AM   Read by Bella Scott at 8/9/2017  4:23 PM   Bella,   Your thyroid ultrasound is stable compared to the previous ultrasound.   Please schedule a follow up appointment with me in 3 months for a recheck since we changed your thyroid replacement dose.   Let me know if you have any questions.   Karolyn Martines NP   Endocrinology      Component      Latest Ref Rng & Units 5/5/2017 8/2/2017   TSH      0.40 - 4.00 mU/L 0.11 (L) 1.51

## 2017-11-07 ENCOUNTER — APPOINTMENT (OUTPATIENT)
Dept: ULTRASOUND IMAGING | Facility: CLINIC | Age: 32
End: 2017-11-07
Attending: EMERGENCY MEDICINE
Payer: COMMERCIAL

## 2017-11-07 ENCOUNTER — HOSPITAL ENCOUNTER (EMERGENCY)
Facility: CLINIC | Age: 32
Discharge: HOME | End: 2017-11-07
Attending: EMERGENCY MEDICINE | Admitting: EMERGENCY MEDICINE
Payer: COMMERCIAL

## 2017-11-07 ENCOUNTER — APPOINTMENT (OUTPATIENT)
Dept: CT IMAGING | Facility: CLINIC | Age: 32
End: 2017-11-07
Attending: EMERGENCY MEDICINE
Payer: COMMERCIAL

## 2017-11-07 VITALS
TEMPERATURE: 98.8 F | OXYGEN SATURATION: 95 % | BODY MASS INDEX: 33.34 KG/M2 | RESPIRATION RATE: 16 BRPM | SYSTOLIC BLOOD PRESSURE: 104 MMHG | WEIGHT: 220 LBS | DIASTOLIC BLOOD PRESSURE: 75 MMHG | HEIGHT: 68 IN

## 2017-11-07 DIAGNOSIS — K52.9 COLITIS: ICD-10-CM

## 2017-11-07 LAB
ALBUMIN SERPL-MCNC: 4.2 G/DL (ref 3.4–5)
ALP SERPL-CCNC: 120 U/L (ref 40–150)
ALT SERPL W P-5'-P-CCNC: 21 U/L (ref 0–50)
ANION GAP SERPL CALCULATED.3IONS-SCNC: 8 MMOL/L (ref 3–14)
AST SERPL W P-5'-P-CCNC: 14 U/L (ref 0–45)
BASOPHILS # BLD AUTO: 0.1 10E9/L (ref 0–0.2)
BASOPHILS NFR BLD AUTO: 0.5 %
BILIRUB SERPL-MCNC: 0.6 MG/DL (ref 0.2–1.3)
BUN SERPL-MCNC: 18 MG/DL (ref 7–30)
CALCIUM SERPL-MCNC: 9 MG/DL (ref 8.5–10.1)
CHLORIDE SERPL-SCNC: 108 MMOL/L (ref 94–109)
CO2 SERPL-SCNC: 24 MMOL/L (ref 20–32)
CREAT SERPL-MCNC: 0.65 MG/DL (ref 0.52–1.04)
DIFFERENTIAL METHOD BLD: NORMAL
EOSINOPHIL # BLD AUTO: 0.1 10E9/L (ref 0–0.7)
EOSINOPHIL NFR BLD AUTO: 1.2 %
ERYTHROCYTE [DISTWIDTH] IN BLOOD BY AUTOMATED COUNT: 13.1 % (ref 10–15)
GFR SERPL CREATININE-BSD FRML MDRD: >90 ML/MIN/1.7M2
GLUCOSE SERPL-MCNC: 86 MG/DL (ref 70–99)
HCT VFR BLD AUTO: 38.8 % (ref 35–47)
HGB BLD-MCNC: 13.4 G/DL (ref 11.7–15.7)
IMM GRANULOCYTES # BLD: 0 10E9/L (ref 0–0.4)
IMM GRANULOCYTES NFR BLD: 0.4 %
LYMPHOCYTES # BLD AUTO: 1.9 10E9/L (ref 0.8–5.3)
LYMPHOCYTES NFR BLD AUTO: 18.4 %
MCH RBC QN AUTO: 28.8 PG (ref 26.5–33)
MCHC RBC AUTO-ENTMCNC: 34.5 G/DL (ref 31.5–36.5)
MCV RBC AUTO: 83 FL (ref 78–100)
MONOCYTES # BLD AUTO: 0.7 10E9/L (ref 0–1.3)
MONOCYTES NFR BLD AUTO: 6.8 %
NEUTROPHILS # BLD AUTO: 7.5 10E9/L (ref 1.6–8.3)
NEUTROPHILS NFR BLD AUTO: 72.7 %
NRBC # BLD AUTO: 0 10*3/UL
NRBC BLD AUTO-RTO: 0 /100
PLATELET # BLD AUTO: 268 10E9/L (ref 150–450)
POTASSIUM SERPL-SCNC: 3.9 MMOL/L (ref 3.4–5.3)
PROT SERPL-MCNC: 8.2 G/DL (ref 6.8–8.8)
RBC # BLD AUTO: 4.66 10E12/L (ref 3.8–5.2)
SODIUM SERPL-SCNC: 140 MMOL/L (ref 133–144)
WBC # BLD AUTO: 10.3 10E9/L (ref 4–11)

## 2017-11-07 PROCEDURE — 80053 COMPREHEN METABOLIC PANEL: CPT | Performed by: EMERGENCY MEDICINE

## 2017-11-07 PROCEDURE — 25000125 ZZHC RX 250: Performed by: EMERGENCY MEDICINE

## 2017-11-07 PROCEDURE — 25000128 H RX IP 250 OP 636: Performed by: EMERGENCY MEDICINE

## 2017-11-07 PROCEDURE — 99285 EMERGENCY DEPT VISIT HI MDM: CPT | Mod: 25

## 2017-11-07 PROCEDURE — 96360 HYDRATION IV INFUSION INIT: CPT

## 2017-11-07 PROCEDURE — 93976 VASCULAR STUDY: CPT | Mod: XS

## 2017-11-07 PROCEDURE — 85025 COMPLETE CBC W/AUTO DIFF WBC: CPT | Performed by: EMERGENCY MEDICINE

## 2017-11-07 PROCEDURE — 74177 CT ABD & PELVIS W/CONTRAST: CPT

## 2017-11-07 RX ORDER — HYDROCODONE BITARTRATE AND ACETAMINOPHEN 5; 325 MG/1; MG/1
1 TABLET ORAL EVERY 6 HOURS PRN
Qty: 15 TABLET | Refills: 0 | Status: SHIPPED | OUTPATIENT
Start: 2017-11-07 | End: 2017-11-10

## 2017-11-07 RX ORDER — SODIUM CHLORIDE 9 MG/ML
1000 INJECTION, SOLUTION INTRAVENOUS CONTINUOUS
Status: DISCONTINUED | OUTPATIENT
Start: 2017-11-07 | End: 2017-11-07 | Stop reason: HOSPADM

## 2017-11-07 RX ORDER — IOPAMIDOL 755 MG/ML
111 INJECTION, SOLUTION INTRAVASCULAR ONCE
Status: COMPLETED | OUTPATIENT
Start: 2017-11-07 | End: 2017-11-07

## 2017-11-07 RX ADMIN — IOPAMIDOL 111 ML: 755 INJECTION, SOLUTION INTRAVENOUS at 13:54

## 2017-11-07 RX ADMIN — SODIUM CHLORIDE 74 ML: 9 INJECTION, SOLUTION INTRAVENOUS at 13:55

## 2017-11-07 RX ADMIN — SODIUM CHLORIDE 1000 ML: 9 INJECTION, SOLUTION INTRAVENOUS at 11:36

## 2017-11-07 ASSESSMENT — ENCOUNTER SYMPTOMS
CONSTIPATION: 1
VOMITING: 0
BLOOD IN STOOL: 0
CHILLS: 1
ABDOMINAL PAIN: 1
DIARRHEA: 1

## 2017-11-07 NOTE — ED PROVIDER NOTES
"  History     Chief Complaint:  Abdominal Pain    HPI   Bella Scott is a 31 year old female who presents with abdominal pain. Patient complains of abdominal cramping, predominantly in her LLQ, that has been ongoing over the past 5 days. These feel similar to menstrual cramps, and have been coming in waves. No vaginal bleeding or discharge. Patient also complains of both diarrhea and constipation, nausea and chills throughout these past few days. These symptoms are also intermittent in nature. Of note, patient had an IUD placed a few months ago. Denies hematochezia and vomiting.     Allergies:  The patient has no known drug allergies.     Medications:    Levothyroxine  Zoloft     Past Medical History:    Anemia  Hypothyroidism  Thyroid nodule  Ovarian cyst  Depression    Past Surgical History:      Skin lesion removal    Family History:    Thyroid disease    Social History:  Relationship status:   Tobacco use: Negative  Alcohol use: Seldom  The patient presents alone.     Review of Systems   Constitutional: Positive for chills.   Gastrointestinal: Positive for abdominal pain, constipation and diarrhea. Negative for blood in stool and vomiting.   Genitourinary: Negative for vaginal bleeding and vaginal discharge.     10 point review of systems performed and is negative except as above and in HPI.      Physical Exam   First Vitals:  BP: (!) 150/92  Heart Rate: 98  Temp: 98.8  F (37.1  C)  Resp: 16  Height: 172.7 cm (5' 8\")  Weight: 99.8 kg (220 lb)  SpO2: 99 %    Physical Exam  General: Resting on gurney, appears uncomfortable  Head:  The scalp, face, and head appear normal  Mouth/Throat: Mucus membranes are moist  CV:  Regular rate    Normal S1 and S2  No pathological murmur   Resp:  Breath sounds clear and equal bilaterally    Non-labored, no retractions or accessory muscle use    No coarseness    No wheezing   GI:  Abdomen is soft, no rigidity    LLQ tenderness to palpation. No guarding or " rebound.   MS:  Normal motor assessment of all extremities.    Good capillary refill noted.      Skin:   No rash or lesions noted.  Neuro: Speech is normal and fluent. No apparent deficit.  Psych: Awake. Alert.  Normal affect.      Appropriate interactions.      Emergency Department Course     Imaging:  Radiographic findings were communicated with the patient who voiced understanding of the findings.    CT Abdomen and Pelvis, w contrast, per radiology:   Sigmoid colitis. Mild free fluid.      Pelvis US, complete w transvaginal, per radiology:  No acute pathology identified.     Laboratory:  CBC: WNL (WBC 10.3, HGB 13.4, )  CMP: WNL (Creatinine 0.65)    Interventions:  1136: Normal Saline, 1000 mL, IV    Emergency Department Course:  Nursing notes and vitals reviewed.  I performed an exam of the patient as documented above.  The above workup was undertaken.  1434: I rechecked the patient and discussed results.    Findings and plan explained to the Patient. Patient discharged home, status improved, with instructions regarding supportive care, medications, and reasons to return as well as the importance of close follow-up was reviewed.      Impression & Plan      Medical Decision Making:  Bella Scott is a 31 year old female who presents with abdominal pain. I considered a broad differential including  diverticulitis, colitis, appendicitis, functional bowel disease, constipation, UTI, GIB, pyelonephritis, ureterolithiasis, hernia, etc.  Rare and serious causes were considered as well in this patient such as volvulus, abscess, aneurysmal disease, mesenteric ischemia, etc.      The workup in the ED is consistent with colitis.  The differential of this includes ischemic, bacterial, idiopathic, autoimmune, etc.  No  Stool sample available today for stool studies and C. Diff. The patient looks well and this point with a reassuring exam so I will not therefore admit for serial exams and further workup.  Patient was  counseled on natural history of colitis and possibility of progression to abscess and/or sepsis depending on reason for the colitis.  Patient is hemodynamically stable in ED at this time.   Return for fevers, increasing pain, other new symptoms develop.  Questions were answered.     Diagnosis:    ICD-10-CM    1. Colitis K52.9 GASTROENTEROLOGY ADULT REF CONSULT ONLY     Disposition:  Discharge to home with primary care follow up.     Discharge Medications:   HYDROCODONE-ACETAMINOPHEN (NORCO) 5-325 MG PER TABLET    Take 1 tablet by mouth every 6 hours as needed for moderate to severe pain     Trung LOUIS, am serving as a scribe on 11/7/2017 at 11:18 AM to personally document services performed by Samara Oscar MD, based on my observations and the provider's statements to me.     EMERGENCY DEPARTMENT       Samara Oscar MD  11/09/17 0131

## 2017-11-07 NOTE — ED AVS SNAPSHOT
Emergency Department    [address] [facility] [address]    Phone:  [phone]    Fax:  [phone]                                       Bella Scott   MRN: [id number]    Department:   Emergency Department   Date of Visit:  11/7/2017           After Visit Summary Signature Page     I have received my discharge instructions, and my questions have been answered. I have discussed any challenges I see with this plan with the nurse or doctor.    ..........................................................................................................................................  Patient/Patient Representative Signature      ..........................................................................................................................................  Patient Representative Print Name and Relationship to Patient    ..................................................               ................................................  Date                                            Time    ..........................................................................................................................................  Reviewed by Signature/Title    ...................................................              ..............................................  Date                                                            Time

## 2017-11-07 NOTE — ED AVS SNAPSHOT
Emergency Department    [address] [facility] [address]    Phone:  [phone]    Fax:  [phone]                                       Bella Scott   MRN: [id number]    Department:   Emergency Department   Date of Visit:  11/7/2017           Patient Information     Date Of Birth          1985        Your diagnoses for this visit were:     Colitis        You were seen by Samara Oscar MD.      Follow-up Information     Follow up with Osmar Rendon PA-C.    Specialty:  Physician Assistant - Medical    Why:  As needed    Contact information:    [address]  [facility] [address]  [phone]          Follow up with  Emergency Department.    Specialty:  EMERGENCY MEDICINE    Why:  As needed, If symptoms worsen    Contact information:    [address] [facility] [address] [phone]        Discharge Instructions       Discharge Instructions  Abdominal Pain    Abdominal pain can be caused by many things. Your evaluation today does not show the exact cause for your pain. Your doctor today has decided that it is unlikely your pain is due to a life threatening problem, or a problem requiring surgery or hospital admission. Sometimes those problems cannot be found right away, so it is very important that you follow up as directed.  Sometimes only the changes which occur over time allow the cause of your pain to be found.    Return to the Emergency Department for a recheck in 8-12 hours if your pain continues.  If your pain gets worse, changes in location, or feels different, return to the Emergency Department right away.    ADULTS:  Return to the Emergency Department right away if:      You get an oral temperature above 102oF or as directed by your doctor.    You have blood in your stools (bright red or black, tarry stools).    You keep throwing up or can t drink liquids.    You see blood when you throw up.    You can t have a bowel movement or you  can t pass gas.    Your stomach gets bloated or bigger.    Your skin or the whites of your eyes look yellow.    You faint.    You have bloody, frequent or painful urination.    You have new symptoms or anything that worries you.    CHILDREN:  Return to the Emergency Department right away if your child has any of the above-listed symptoms or the following:      Pushes your hand away or screams/cries when his/her belly is touched.    You notice your child is very fussy or weak.    Your child is very tired and is too tired to eat or drink.    Your child is dehydrated.  Signs of dehydration can be:  o Your infant has had no wet diapers in 4-5 hours.  o Your older child has not passed urine in 6-8 hours.  o Your infant or child starts to have dry mouth and lips, or no saliva or tears.    PREGNANT WOMEN:  Return to the Emergency Department right away if you have any of the above-listed symptoms or the following:      You have bleeding, leaking fluid or passing tissue from the vagina.    You have worse pain or cramping, or pain in your shoulder or back.    You have vomiting that will not stop.    You have painful or bloody urination.    You have a temperature of 100oF or more.    Your baby is not moving as much as usual.    You faint.    You get a bad headache with or without eye problems and abdominal pain.    You have a convulsion or seizure.    You have unusual discharge from your vagina and abdominal pain.    Abdominal pain is pretty common during pregnancy.  Your pain may or may not be related to your pregnancy. You should follow-up closely with your OB doctor so they can evaluate you and your baby.  Until you follow-up with your regular doctor, do the following:       Avoid sex and do not put anything in your vagina.    Drink clear fluids.    Only take medications approved by your doctor.    MORE INFORMATION:    Appendicitis:  A possible cause of abdominal pain in any person who still has their appendix is acute  "appendicitis. Appendicitis is often hard to diagnose.  Testing does not always rule out early appendicitis or other causes of abdominal pain. Close follow-up with your doctor and re-evaluations may be needed to figure out the reason for your abdominal pain.    Follow-up:  It is very important that you make an appointment with your clinic and go to the appointment.  If you do not follow-up with your primary doctor, it may result in missing an important development which could result in permanent injury or disability and/or lasting pain.  If there is any problem keeping your appointment, call your doctor or return to the Emergency Department.    Medications:  Take your medications as directed by your doctor today.  Before using over-the-counter medications, ask your doctor and make sure to take the medications as directed.  If you have any questions about medications, ask your doctor.    Diet:  Resume your normal diet as much as possible, but do not eat fried, fatty or spicy foods while you have pain.  Do not drink alcohol or have caffeine.  Do not smoke tobacco.    Probiotics: If you have been given an antibiotic, you may want to also take a probiotic pill or eat yogurt with live cultures. Probiotics have \"good bacteria\" to help your intestines stay healthy. Studies have shown that probiotics help prevent diarrhea and other intestine problems (including C. diff infection) when you take antibiotics. You can buy these without a prescription in the pharmacy section of the store.     If you were given a prescription for medicine here today, be sure to read all of the information (including the package insert) that comes with your prescription.  This will include important information about the medicine, its side effects, and any warnings that you need to know about.  The pharmacist who fills the prescription can provide more information and answer questions you may have about the medicine.  If you have questions or " concerns that the pharmacist cannot address, please call or return to the Emergency Department.         Opioid Medication Information    Pain medications are among the most commonly prescribed medicines, so we are including this information for all our patients. If you did not receive pain medication or get a prescription for pain medicine, you can ignore it.     You may have been given a prescription for an opioid (narcotic) pain medicine and/or have received a pain medicine while here in the Emergency Department. These medicines can make you drowsy or impaired. You must not drive, operate dangerous equipment, or engage in any other dangerous activities while taking these medications. If you drive while taking these medications, you could be arrested for DUI, or driving under the influence. Do not drink any alcohol while you are taking these medications.     Opioid pain medications can cause addiction. If you have a history of chemical dependency of any type, you are at a higher risk of becoming addicted to pain medications.  Only take these prescribed medications to treat your pain when all other options have been tried. Take it for as short a time and as few doses as possible. Store your pain pills in a secure place, as they are frequently stolen and provide a dangerous opportunity for children or visitors in your house to start abusing these powerful medications. We will not replace any lost or stolen medicine.  As soon as your pain is better, you should flush all your remaining medication.     Many prescription pain medications contain Tylenol  (acetaminophen), including Vicodin , Tylenol #3 , Norco , Lortab , and Percocet .  You should not take any extra pills of Tylenol  if you are using these prescription medications or you can get very sick.  Do not ever take more than 3000 mg of acetaminophen in any 24 hour period.    All opioids tend to cause constipation. Drink plenty of water and eat foods that have a  lot of fiber, such as fruits, vegetables, prune juice, apple juice and high fiber cereal.  Take a laxative if you don t move your bowels at least every other day. Miralax , Milk of Magnesia, Colace , or Senna  can be used to keep you regular.      Remember that you can always come back to the Emergency Department if you are not able to see your regular doctor in the amount of time listed above, if you get any new symptoms, or if there is anything that worries you.          24 Hour Appointment Hotline       To make an appointment at any [facility], call [phone]). If you don't have a family doctor or clinic, we will help you find one. [facility] clinics are conveniently located to serve the needs of you and your family.          ED Discharge Orders     GASTROENTEROLOGY ADULT REF CONSULT ONLY       Preferred Location: [facility] [phone] and [facility] [phone]      Please be aware that coverage of these services is subject to the terms and limitations of your health insurance plan.  Call member services at your health plan with any benefit or coverage questions.  Any procedures must be performed at a [facility] facility OR coordinated by your clinic's referral office.    Please bring the following with you to your appointment:    (1) Any X-Rays, CTs or MRIs which have been performed.  Contact the facility where they were done to arrange for  prior to your scheduled appointment.    (2) List of current medications   (3) This referral request   (4) Any documents/labs given to you for this referral                     Review of your medicines      START taking        Dose / Directions Last dose taken    HYDROcodone-acetaminophen 5-325 MG per tablet   Commonly known as:  NORCO   Dose:  1 tablet   Quantity:  15 tablet        Take 1 tablet by mouth every 6 hours as needed for moderate to severe pain   Refills:  0          Our records show that you are taking the  medicines listed below. If these are incorrect, please call your family doctor or clinic.        Dose / Directions Last dose taken    levothyroxine 112 MCG tablet   Commonly known as:  SYNTHROID/LEVOTHROID   Quantity:  90 tablet        TAKE 1 TABLET (112 MCG) BY MOUTH DAILY   Refills:  0        PRENATABS FA Tabs        Take  by mouth.   Refills:  0        sertraline 50 MG tablet   Commonly known as:  ZOLOFT        Refills:  0        VITAMIN D (CHOLECALCIFEROL) PO   Dose:  2000 Units        Take 2,000 Units by mouth daily   Refills:  0                Prescriptions were sent or printed at these locations (1 Prescription)                   Other Prescriptions                Printed at Department/Unit printer (1 of 1)         HYDROcodone-acetaminophen (NORCO) 5-325 MG per tablet                Procedures and tests performed during your visit     CBC with platelets differential    CT Abdomen Pelvis w Contrast    Comprehensive metabolic panel    US Pelvic Complete w Transvaginal & Abd/Pel Duplex Limited      Orders Needing Specimen Collection     None      Pending Results     Date and Time Order Name Status Description    11/7/2017 1241 CT Abdomen Pelvis w Contrast Preliminary             Pending Culture Results     No orders found from 11/5/2017 to 11/8/2017.            Pending Results Instructions     If you had any lab results that were not finalized at the time of your Discharge, you can call the ED Lab Result RN at [phone]. You will be contacted by this team for any positive Lab results or changes in treatment. The nurses are available 7 days a week from 10A to 6:30P.  You can leave a message 24 hours per day and they will return your call.        Test Results From Your Hospital Stay        11/7/2017 12:06 PM      Component Results     Component Value Ref Range & Units Status    Sodium 140 133 - 144 mmol/L Final    Potassium 3.9 3.4 - 5.3 mmol/L Final    Chloride 108 94 - 109 mmol/L Final    Carbon Dioxide 24 20  - 32 mmol/L Final    Anion Gap 8 3 - 14 mmol/L Final    Glucose 86 70 - 99 mg/dL Final    Urea Nitrogen 18 7 - 30 mg/dL Final    Creatinine 0.65 0.52 - 1.04 mg/dL Final    GFR Estimate >90 >60 mL/min/1.7m2 Final    Non  GFR Calc    GFR Estimate If Black >90 >60 mL/min/1.7m2 Final    African American GFR Calc    Calcium 9.0 8.5 - 10.1 mg/dL Final    Bilirubin Total 0.6 0.2 - 1.3 mg/dL Final    Albumin 4.2 3.4 - 5.0 g/dL Final    Protein Total 8.2 6.8 - 8.8 g/dL Final    Alkaline Phosphatase 120 40 - 150 U/L Final    ALT 21 0 - 50 U/L Final    AST 14 0 - 45 U/L Final         11/7/2017 11:48 AM      Component Results     Component Value Ref Range & Units Status    WBC 10.3 4.0 - 11.0 10e9/L Final    RBC Count 4.66 3.8 - 5.2 10e12/L Final    Hemoglobin 13.4 11.7 - 15.7 g/dL Final    Hematocrit 38.8 35.0 - 47.0 % Final    MCV 83 78 - 100 fl Final    MCH 28.8 26.5 - 33.0 pg Final    MCHC 34.5 31.5 - 36.5 g/dL Final    RDW 13.1 10.0 - 15.0 % Final    Platelet Count 268 150 - 450 10e9/L Final    Diff Method Automated Method  Final    % Neutrophils 72.7 % Final    % Lymphocytes 18.4 % Final    % Monocytes 6.8 % Final    % Eosinophils 1.2 % Final    % Basophils 0.5 % Final    % Immature Granulocytes 0.4 % Final    Nucleated RBCs 0 0 /100 Final    Absolute Neutrophil 7.5 1.6 - 8.3 10e9/L Final    Absolute Lymphocytes 1.9 0.8 - 5.3 10e9/L Final    Absolute Monocytes 0.7 0.0 - 1.3 10e9/L Final    Absolute Eosinophils 0.1 0.0 - 0.7 10e9/L Final    Absolute Basophils 0.1 0.0 - 0.2 10e9/L Final    Abs Immature Granulocytes 0.0 0 - 0.4 10e9/L Final    Absolute Nucleated RBC 0.0  Final         11/7/2017  1:42 PM      Narrative     ULTRASOUND PELVIS COMPLETE WITH TRANSVAGINAL AND DOPPLER LIMITED  11/7/2017 12:22 PM     HISTORY: Pelvic pain, left side. Recent IUD placement without position  check.     TECHNIQUE: Transabdominal and transvaginal imaging was performed.   Transvaginal exam performed to better evaluate:  Uterus, ovaries,  adnexa.    Comparison 6/7/2012    FINDINGS: Endometrial thickness 0.5 cm. Minimal amount of nonspecific  lower pelvic fluid, which is within normal limits for physiologic  fluid. Doppler waveform analysis shows blood flow within both ovaries.  Ovaries unremarkable. IUD in place.        Impression     IMPRESSION: No acute pathology identified.    ISMA STEVEN MD         11/7/2017  2:10 PM      Narrative     CT ABDOMEN AND PELVIS WITH CONTRAST  11/7/2017 1:57 PM     HISTORY: Left lower quadrant pain with guarding.    TECHNIQUE:   111mL Isovue-370. Radiation dose for this scan was  reduced using automated exposure control, adjustment of the mA and/or  kV according to patient size, or iterative reconstruction technique.    COMPARISON: None.    FINDINGS:  IUD. Abnormal wall thickening along the sigmoid colon,  extending to the descending-sigmoid colon junction region. This is  consistent with nonspecific colitis. No small bowel obstruction. Mild  free fluid in the pelvis. There is also a small amount of free fluid  at the inferior perihepatic region. The appendix is thicker than usual  (up to 9 mm); however, this can be normal, and there are no other  findings suspicious for appendicitis.  Nothing else acute is seen in  the upper abdominal organs.         Impression     IMPRESSION:  Sigmoid colitis. Mild free fluid.                Clinical Quality Measure: Blood Pressure Screening     Your blood pressure was checked while you were in the emergency department today. The last reading we obtained was  BP: (!) 150/92 . Please read the guidelines below about what these numbers mean and what you should do about them.  If your systolic blood pressure (the top number) is less than 120 and your diastolic blood pressure (the bottom number) is less than 80, then your blood pressure is normal. There is nothing more that you need to do about it.  If your systolic blood pressure (the top number) is 120-139 or your  diastolic blood pressure (the bottom number) is 80-89, your blood pressure may be higher than it should be. You should have your blood pressure rechecked within a year by a primary care provider.  If your systolic blood pressure (the top number) is 140 or greater or your diastolic blood pressure (the bottom number) is 90 or greater, you may have high blood pressure. High blood pressure is treatable, but if left untreated over time it can put you at risk for heart attack, stroke, or kidney failure. You should have your blood pressure rechecked by a primary care provider within the next 4 weeks.  If your provider in the emergency department today gave you specific instructions to follow-up with your doctor or provider even sooner than that, you should follow that instruction and not wait for up to 4 weeks for your follow-up visit.        Thank you for choosing [facility]       Thank you for choosing [facility] for your care. Our goal is always to provide you with excellent care. Hearing back from our patients is one way we can continue to improve our services. Please take a few minutes to complete the written survey that you may receive in the mail after you visit with us. Thank you!        [employer] Information     [employer] gives you secure access to your electronic health record. If you see a primary care provider, you can also send messages to your care team and make appointments. If you have questions, please call your primary care clinic.  If you do not have a primary care provider, please call [phone] and they will assist you.        Care EveryWhere ID     This is your Care EveryWhere ID. This could be used by other organizations to access your [facility] medical records  [id number]        Equal Access to Services     MISBAH DUARTE : Hadii mike mayorgao Sodelmar, waaxda luqadaha, qaybta kaalmada callie, kaila france . So [facility] [phone].    ATENCIÓN: savanah Jansen  disposición servicios gratuitos de asistencia lingüística. Roque al [phone].    We comply with applicable federal civil rights laws and [place] laws. We do not discriminate on the basis of race, color, national origin, age, disability, sex, sexual orientation, or gender identity.            After Visit Summary       This is your record. Keep this with you and show to your community pharmacist(s) and doctor(s) at your next visit.

## 2017-11-07 NOTE — LETTER
To Whom it may concern:      Bella Scott was seen in our Emergency Department today, 11/07/17.  I expect her condition to improve over the next 3 days.  She may return to work/school when improved.    Sincerely,        Samara Oscar MD

## 2017-11-10 ENCOUNTER — OFFICE VISIT (OUTPATIENT)
Dept: FAMILY MEDICINE | Facility: CLINIC | Age: 32
End: 2017-11-10
Payer: COMMERCIAL

## 2017-11-10 VITALS
DIASTOLIC BLOOD PRESSURE: 70 MMHG | BODY MASS INDEX: 33.91 KG/M2 | HEART RATE: 72 BPM | RESPIRATION RATE: 16 BRPM | WEIGHT: 223 LBS | SYSTOLIC BLOOD PRESSURE: 110 MMHG | TEMPERATURE: 98.4 F

## 2017-11-10 DIAGNOSIS — F32.0 MILD MAJOR DEPRESSION (H): ICD-10-CM

## 2017-11-10 DIAGNOSIS — E03.4 HYPOTHYROIDISM DUE TO ACQUIRED ATROPHY OF THYROID: ICD-10-CM

## 2017-11-10 DIAGNOSIS — K52.9 COLITIS: Primary | ICD-10-CM

## 2017-11-10 PROCEDURE — 36415 COLL VENOUS BLD VENIPUNCTURE: CPT | Performed by: FAMILY MEDICINE

## 2017-11-10 PROCEDURE — 99214 OFFICE O/P EST MOD 30 MIN: CPT | Performed by: PHYSICIAN ASSISTANT

## 2017-11-10 PROCEDURE — 84439 ASSAY OF FREE THYROXINE: CPT | Performed by: FAMILY MEDICINE

## 2017-11-10 PROCEDURE — 84443 ASSAY THYROID STIM HORMONE: CPT | Performed by: FAMILY MEDICINE

## 2017-11-10 ASSESSMENT — PATIENT HEALTH QUESTIONNAIRE - PHQ9
5. POOR APPETITE OR OVEREATING: NOT AT ALL
SUM OF ALL RESPONSES TO PHQ QUESTIONS 1-9: 5

## 2017-11-10 ASSESSMENT — ANXIETY QUESTIONNAIRES
2. NOT BEING ABLE TO STOP OR CONTROL WORRYING: NOT AT ALL
1. FEELING NERVOUS, ANXIOUS, OR ON EDGE: SEVERAL DAYS
GAD7 TOTAL SCORE: 6
3. WORRYING TOO MUCH ABOUT DIFFERENT THINGS: MORE THAN HALF THE DAYS
IF YOU CHECKED OFF ANY PROBLEMS ON THIS QUESTIONNAIRE, HOW DIFFICULT HAVE THESE PROBLEMS MADE IT FOR YOU TO DO YOUR WORK, TAKE CARE OF THINGS AT HOME, OR GET ALONG WITH OTHER PEOPLE: SOMEWHAT DIFFICULT
5. BEING SO RESTLESS THAT IT IS HARD TO SIT STILL: NOT AT ALL
6. BECOMING EASILY ANNOYED OR IRRITABLE: MORE THAN HALF THE DAYS
7. FEELING AFRAID AS IF SOMETHING AWFUL MIGHT HAPPEN: SEVERAL DAYS

## 2017-11-10 NOTE — PROGRESS NOTES
SUBJECTIVE:   Bella Scott is a 31 year old female who presents to clinic today for the following health issues:    ED/UC Followup:    Facility:  [facility]  Date of visit: 11/07/17  Reason for visit: Abdominal pain  Current Status: improving      Patient presents requesting provider sign form to show she is ready to return to work after diagnosis of colitis 4 days ago in ED.  Patient experienced intermittent abdominal cramping with mild diarrhea that began 7 days ago, worsened to point where 4 days ago she presented to ED.  See ED report for more details.  In summary, patient was diagnosed with colitis with no readily apparent cause and was released with pain control medication (hyrocodone-acetaminophen).  Patient currently feeling better, with only minor abdominal cramping episodes and no diarrhea over last 3 days.  Patient has been able to resume normal diet within last 2 days and now wants to get cleared to return to work.       Patient also requests change in anti-depressant medication regimen.  Patient started on Sertraline this past spring for postpartum depression symptoms, and has experienced fatigue as a possible side effect.  Patient interested in either increasing dose or switching medication.  Has heard Wellbutrin and Lexapro could be good options for increasing energy, but is concerned about effect on her current breastfeeding.  Patient also requests TSH lab test prior to upcoming appointment with Endocrinology regarding previous diagnosis of hypothyroidism.    PHQ-2 Score:   PHQ-2 Score:     PHQ-2 ( 1999 [employer]) 11/10/2017 5/5/2017   Q1: Little interest or pleasure in doing things 0 2   Q2: Feeling down, depressed or hopeless 1 1   PHQ-2 Score 1 3     Problem list and histories reviewed & adjusted, as indicated.  Additional history: as documented    Current Outpatient Prescriptions   Medication Sig Dispense Refill     levothyroxine (SYNTHROID/LEVOTHROID) 112 MCG tablet TAKE 1 TABLET (112  MCG) BY MOUTH DAILY 90 tablet 0     sertraline (ZOLOFT) 50 MG tablet        VITAMIN D, CHOLECALCIFEROL, PO Take 2,000 Units by mouth daily       Prenatal Vit-Fe Fumarate-FA (PRENATABS FA) TABS Take  by mouth.       Reviewed and updated as needed this visit by clinical staff  Tobacco  Allergies  Med Hx  Surg Hx  Fam Hx  Soc Hx      Reviewed and updated as needed this visit by Provider      ROS:  Constitutional, HEENT, cardiovascular, pulmonary, GI, , musculoskeletal, neuro, skin, endocrine and psych systems are negative, except as otherwise noted.    This document serves as a record of the services and decisions personally performed and made by Osmar Rendon PA-C.  It was created on her behalf by Jose Hoyt, a PA student in clinical rotation training.  The creation of this document is based on the provider's statements to the PA student.  Jose Hoyt November 10, 2017    OBJECTIVE:   /70 (BP Location: Right arm, Patient Position: Chair, Cuff Size: Adult Large)  Pulse 72  Temp 98.4  F (36.9  C) (Oral)  Resp 16  Wt 223 lb (101.2 kg)  BMI 33.91 kg/m2  Body mass index is 33.91 kg/(m^2).  GENERAL: healthy, alert and no distress  NECK: no adenopathy, no asymmetry, masses, or scars and thyroid normal to palpation  RESP: lungs clear to auscultation - no rales, rhonchi or wheezes  CV: regular rate and rhythm, normal S1 S2, no S3 or S4, no murmur, click or rub, no peripheral edema and peripheral pulses strong  ABDOMEN: Mild tenderness in LLQ.  Otherwise soft, nontender, without hepatosplenomegaly or masses, and bowel sounds normal  MS: no gross musculoskeletal defects noted, no edema  SKIN: no suspicious lesions or rashes  PSYCH: Normal mentation and affect.    Diagnostic Test Results:  TSH   Date Value Ref Range Status   08/02/2017 1.51 0.40 - 4.00 mU/L Final     ASSESSMENT/PLAN:   1. Colitis  Patient experiencing mild and fading effects of colitis, cleared to return to work on Monday,  11/14/2017.    2. Mild major depression (H)  Uncontrolled due to recurring fatigue.  Maintain Sertraline because it is recommended first-line for women who breastfeed.  Increasing anti-depressant medication from Sertraline 50mg qd to Sertraline 100mg qd.    3. Hypothyroidism due to acquired atrophy of thyroid  Blood work completed per patient's request.  Patient encouraged to go to upcoming appointment with Endocrinology concerning her previous diagnosis of hypothyroidism.   - TSH  - T4 FREE    See Patient Instructions    The information in this document, created by the PA student for me, accurately reflects the services I personally performed and the decisions made by me.  I have reviewed and approved this document for accuracy prior to leaving the patient care area.    Osmar Rendon PA-C  [facility]

## 2017-11-10 NOTE — NURSING NOTE
"Chief Complaint   Patient presents with     ER F/U     abdominal pain       Initial /70 (BP Location: Right arm, Patient Position: Chair, Cuff Size: Adult Large)  Pulse 72  Temp 98.4  F (36.9  C) (Oral)  Resp 16  Wt 223 lb (101.2 kg)  BMI 33.91 kg/m2 Estimated body mass index is 33.91 kg/(m^2) as calculated from the following:    Height as of 11/7/17: 5' 8\" (1.727 m).    Weight as of this encounter: 223 lb (101.2 kg).  Medication Reconciliation: complete      Health Maintenance addressed:  NONE    N/a  .Ronak LEACH MA        "

## 2017-11-10 NOTE — MR AVS SNAPSHOT
After Visit Summary   11/10/2017    Bella Scott    MRN: [id number]           Patient Information     Date Of Birth          1985        Visit Information        Provider Department      11/10/2017 11:40 AM Osmar Rendon PA-C [facility]        Today's Diagnoses     Colitis    -  1    Mild major depression (H)        Hypothyroidism due to acquired atrophy of thyroid           Follow-ups after your visit        Follow-up notes from your care team     Return if symptoms worsen or fail to improve.      Who to contact     If you have questions or need follow up information about today's clinic visit or your schedule please contact [facility] directly at [phone].  Normal or non-critical lab and imaging results will be communicated to you by [x+1]hart, letter or phone within 4 business days after the clinic has received the results. If you do not hear from us within 7 days, please contact the clinic through [x+1]hart or phone. If you have a critical or abnormal lab result, we will notify you by phone as soon as possible.  Submit refill requests through Verimed or call your pharmacy and they will forward the refill request to us. Please allow 3 business days for your refill to be completed.          Additional Information About Your Visit        MyChart Information     Verimed gives you secure access to your electronic health record. If you see a primary care provider, you can also send messages to your care team and make appointments. If you have questions, please call your primary care clinic.  If you do not have a primary care provider, please call [phone] and they will assist you.        Care EveryWhere ID     This is your Care EveryWhere ID. This could be used by other organizations to access your [place] medical records  [id number]        Your Vitals Were     Pulse Temperature Respirations BMI (Body Mass Index)          72 98.4  F (36.9  C)  (Oral) 16 33.91 kg/m2         Blood Pressure from Last 3 Encounters:   11/10/17 110/70   11/07/17 104/75   08/02/17 120/68    Weight from Last 3 Encounters:   11/10/17 223 lb (101.2 kg)   11/07/17 220 lb (99.8 kg)   08/02/17 221 lb (100.2 kg)              Today, you had the following     No orders found for display       Primary Care Provider Office Phone # Fax Mathew Rendon PA-C [phone]       [address]  [facility] [address]        Equal Access to Services     : Hadii mike shoemaker hadasho Soomaali, waaxda luqadaha, qaybta kaalmada adeegyada, kaila france . So [facility] [phone].    ATENCIÓN: Si habla español, tiene a guallpa disposición servicios gratuitos de asistencia lingüística. Llame al [phone].    We comply with applicable federal civil rights laws and [place] laws. We do not discriminate on the basis of race, color, national origin, age, disability, sex, sexual orientation, or gender identity.            Thank you!     Thank you for choosing [facility]  for your care. Our goal is always to provide you with excellent care. Hearing back from our patients is one way we can continue to improve our services. Please take a few minutes to complete the written survey that you may receive in the mail after your visit with us. Thank you!             Your Updated Medication List - Protect others around you: Learn how to safely use, store and throw away your medicines at www.disposemymeds.org.          This list is accurate as of: 11/10/17 12:57 PM.  Always use your most recent med list.                   Brand Name Dispense Instructions for use Diagnosis    levothyroxine 112 MCG tablet    SYNTHROID/LEVOTHROID    90 tablet    TAKE 1 TABLET (112 MCG) BY MOUTH DAILY    Hypothyroidism due to acquired atrophy of thyroid       PRENATABS FA Tabs      Take  by mouth.        sertraline 50 MG tablet    ZOLOFT          VITAMIN D  (CHOLECALCIFEROL) PO      Take 2,000 Units by mouth daily

## 2017-11-11 LAB
T4 FREE SERPL-MCNC: 0.99 NG/DL (ref 0.76–1.46)
TSH SERPL DL<=0.005 MIU/L-ACNC: 1.7 MU/L (ref 0.4–4)

## 2017-11-11 ASSESSMENT — ANXIETY QUESTIONNAIRES: GAD7 TOTAL SCORE: 6

## 2017-11-13 NOTE — TELEPHONE ENCOUNTER
Pt had her labs and the results are back.  LS - please comment.  Pt can't get into [facility] to view labs.  Pt is wanting to know if she needs to f/u with you regarding the U/S that she had a couple months ago?  Please advise. Shari Schoenberger, CMA

## 2017-11-14 RX ORDER — LEVOTHYROXINE SODIUM 112 UG/1
TABLET ORAL
Qty: 90 TABLET | Refills: 3 | Status: SHIPPED | OUTPATIENT
Start: 2017-11-14 | End: 2019-01-16

## 2017-11-14 NOTE — TELEPHONE ENCOUNTER
Thyroid levels look great, continue levo 112 mcg/day.  I refilled the Rx for one year.  I'm sorry she didn't hear back about the ultrasound - not sure what happened.  The ultrasound was stable, nodules were stable.     Thyroid nodules as follows:   Right Lobe:   1. Hyperechoic nodule relative to the surrounding thyroid gland  measures 1.5 x 1.3 x 0.6 cm in the lower pole and is stable.     Isthmus:   2. Slightly hypoechoic inferior isthmus nodule measures 0.9 x 1.0 x  0.3 cm which is stable if not minimally smaller compared to prior  exam.     Follow up in clinic in one year - we don't need to repeat the ultrasound for one year either.  Let me know if she has any other questions.  Karolyn Martines NP  Endocrinology

## 2017-11-15 NOTE — PROGRESS NOTES
Bella,  Here's a copy of your recent lab results.  I think this was already relayed to you but levels look great.  Please continue levothyroxine 112 mcg/day.  No follow up needed for one year.  Karolyn Martines NP  Endocrinology

## 2017-12-29 ENCOUNTER — OFFICE VISIT (OUTPATIENT)
Dept: FAMILY MEDICINE | Facility: CLINIC | Age: 32
End: 2017-12-29
Payer: COMMERCIAL

## 2017-12-29 ENCOUNTER — RADIANT APPOINTMENT (OUTPATIENT)
Dept: GENERAL RADIOLOGY | Facility: CLINIC | Age: 32
End: 2017-12-29
Attending: NURSE PRACTITIONER
Payer: COMMERCIAL

## 2017-12-29 VITALS
TEMPERATURE: 98.2 F | SYSTOLIC BLOOD PRESSURE: 112 MMHG | HEART RATE: 84 BPM | DIASTOLIC BLOOD PRESSURE: 70 MMHG | BODY MASS INDEX: 35.12 KG/M2 | WEIGHT: 231 LBS | RESPIRATION RATE: 16 BRPM

## 2017-12-29 DIAGNOSIS — M53.3 PAIN IN THE COCCYX: ICD-10-CM

## 2017-12-29 DIAGNOSIS — F32.0 MILD MAJOR DEPRESSION (H): Primary | ICD-10-CM

## 2017-12-29 PROCEDURE — 99214 OFFICE O/P EST MOD 30 MIN: CPT | Performed by: NURSE PRACTITIONER

## 2017-12-29 PROCEDURE — 72220 X-RAY EXAM SACRUM TAILBONE: CPT

## 2017-12-29 RX ORDER — ESCITALOPRAM OXALATE 10 MG/1
10 TABLET ORAL DAILY
Qty: 30 TABLET | Refills: 0 | Status: SHIPPED | OUTPATIENT
Start: 2017-12-29 | End: 2018-02-06

## 2017-12-29 ASSESSMENT — ANXIETY QUESTIONNAIRES
GAD7 TOTAL SCORE: 8
7. FEELING AFRAID AS IF SOMETHING AWFUL MIGHT HAPPEN: MORE THAN HALF THE DAYS
1. FEELING NERVOUS, ANXIOUS, OR ON EDGE: SEVERAL DAYS
5. BEING SO RESTLESS THAT IT IS HARD TO SIT STILL: NOT AT ALL
2. NOT BEING ABLE TO STOP OR CONTROL WORRYING: NOT AT ALL
6. BECOMING EASILY ANNOYED OR IRRITABLE: NEARLY EVERY DAY
IF YOU CHECKED OFF ANY PROBLEMS ON THIS QUESTIONNAIRE, HOW DIFFICULT HAVE THESE PROBLEMS MADE IT FOR YOU TO DO YOUR WORK, TAKE CARE OF THINGS AT HOME, OR GET ALONG WITH OTHER PEOPLE: SOMEWHAT DIFFICULT
3. WORRYING TOO MUCH ABOUT DIFFERENT THINGS: MORE THAN HALF THE DAYS

## 2017-12-29 ASSESSMENT — PATIENT HEALTH QUESTIONNAIRE - PHQ9
SUM OF ALL RESPONSES TO PHQ QUESTIONS 1-9: 9
5. POOR APPETITE OR OVEREATING: NOT AT ALL

## 2017-12-29 NOTE — LETTER
My Depression Action Plan  Name: Bella Scott   Date of Birth 1985  Date: 12/29/2017    My doctor: Osmar Rendon   My clinic: [address], [address]  [facility] [address] [phone]          GREEN    ZONE   Good Control    What it looks like:     Things are going generally well. You have normal up s and down s. You may even feel depressed from time to time, but bad moods usually last less than a day.   What you need to do:  1. Continue to care for yourself (see self care plan)  2. Check your depression survival kit and update it as needed  3. Follow your physician s recommendations including any medication.  4. Do not stop taking medication unless you consult with your physician first.           YELLOW         ZONE Getting Worse    What it looks like:     Depression is starting to interfere with your life.     It may be hard to get out of bed; you may be starting to isolate yourself from others.    Symptoms of depression are starting to last most all day and this has happened for several days.     You may have suicidal thoughts but they are not constant.   What you need to do:     1. Call your care team, your response to treatment will improve if you keep your care team informed of your progress. Yellow periods are signs an adjustment may need to be made.     2. Continue your self-care, even if you have to fake it!    3. Talk to someone in your support network    4. Open up your depression survival kit           RED    ZONE Medical Alert - Get Help    What it looks like:     Depression is seriously interfering with your life.     You may experience these or other symptoms: You can t get out of bed most days, can t work or engage in other necessary activities, you have trouble taking care of basic hygiene, or basic responsibilities, thoughts of suicide or death that will not go away, self-injurious behavior.     What you need to do:  1. Call  your care team and request a same-day appointment. If they are not available (weekends or after hours) call your local crisis line, emergency room or 911.      Electronically signed by: Pat Hahn, December 29, 2017    Depression Self Care Plan / Survival Kit    Self-Care for Depression  Here s the deal. Your body and mind are really not as separate as most people think.  What you do and think affects how you feel and how you feel influences what you do and think. This means if you do things that people who feel good do, it will help you feel better.  Sometimes this is all it takes.  There is also a place for medication and therapy depending on how severe your depression is, so be sure to consult with your medical provider and/ or Behavioral Health Consultant if your symptoms are worsening or not improving.     In order to better manage my stress, I will:    Exercise  Get some form of exercise, every day. This will help reduce pain and release endorphins, the  feel good  chemicals in your brain. This is almost as good as taking antidepressants!  This is not the same as joining a gym and then never going! (they count on that by the way ) It can be as simple as just going for a walk or doing some gardening, anything that will get you moving.      Hygiene   Maintain good hygiene (Get out of bed in the morning, Make your bed, Brush your teeth, Take a shower, and Get dressed like you were going to work, even if you are unemployed).  If your clothes don't fit try to get ones that do.    Diet  I will strive to eat foods that are good for me, drink plenty of water, and avoid excessive sugar, caffeine, alcohol, and other mood-altering substances.  Some foods that are helpful in depression are: complex carbohydrates, B vitamins, flaxseed, fish or fish oil, fresh fruits and vegetables.    Psychotherapy  I agree to participate in Individual Therapy (if recommended).    Medication  If prescribed medications, I agree to  take them.  Missing doses can result in serious side effects.  I understand that drinking alcohol, or other illicit drug use, may cause potential side effects.  I will not stop my medication abruptly without first discussing it with my provider.    Staying Connected With Others  I will stay in touch with my friends, family members, and my primary care provider/team.    Use your imagination  Be creative.  We all have a creative side; it doesn t matter if it s oil painting, sand castles, or mud pies! This will also kick up the endorphins.    Witness Beauty  (AKA stop and smell the roses) Take a look outside, even in mid-winter. Notice colors, textures. Watch the squirrels and birds.     Service to others  Be of service to others.  There is always someone else in need.  By helping others we can  get out of ourselves  and remember the really important things.  This also provides opportunities for practicing all the other parts of the program.    Humor  Laugh and be silly!  Adjust your TV habits for less news and crime-drama and more comedy.    Control your stress  Try breathing deep, massage therapy, biofeedback, and meditation. Find time to relax each day.     My support system    Clinic Contact:  Phone number:    Contact 1:  Phone number:    Contact 2:  Phone number:    Synagogue/:  Phone number:    Therapist:  Phone number:    Local crisis center:    Phone number:    Other community support:  Phone number:

## 2017-12-29 NOTE — MR AVS SNAPSHOT
After Visit Summary   12/29/2017    Bella Scott    MRN: [id number]           Patient Information     Date Of Birth          1985        Visit Information        Provider Department      12/29/2017 3:00 PM Pat Hahn APRN CNP [facility]        Today's Diagnoses     Pain in the coccyx    -  1    Mild major depression (H)           Follow-ups after your visit        Follow-up notes from your care team     Return in about 4 weeks (around 1/26/2018) for mood.      Who to contact     If you have questions or need follow up information about today's clinic visit or your schedule please contact [facility] directly at [phone].  Normal or non-critical lab and imaging results will be communicated to you by BeMyEyehart, letter or phone within 4 business days after the clinic has received the results. If you do not hear from us within 7 days, please contact the clinic through BeMyEyehart or phone. If you have a critical or abnormal lab result, we will notify you by phone as soon as possible.  Submit refill requests through [employer] or call your pharmacy and they will forward the refill request to us. Please allow 3 business days for your refill to be completed.          Additional Information About Your Visit        MyChart Information     [employer] gives you secure access to your electronic health record. If you see a primary care provider, you can also send messages to your care team and make appointments. If you have questions, please call your primary care clinic.  If you do not have a primary care provider, please call [phone] and they will assist you.        Care EveryWhere ID     This is your Care EveryWhere ID. This could be used by other organizations to access your [place] medical records  [id number]        Your Vitals Were     Pulse Temperature Respirations Breastfeeding? BMI (Body Mass Index)       84 98.2  F (36.8  C) (Oral) 16 Yes 35.12 kg/m2         Blood Pressure from Last 3 Encounters:   12/29/17 112/70   11/10/17 110/70   11/07/17 104/75    Weight from Last 3 Encounters:   12/29/17 231 lb (104.8 kg)   11/10/17 223 lb (101.2 kg)   11/07/17 220 lb (99.8 kg)              We Performed the Following     DEPRESSION ACTION PLAN (DAP)          Today's Medication Changes          These changes are accurate as of: 12/29/17  4:15 PM.  If you have any questions, ask your nurse or doctor.               Start taking these medicines.        Dose/Directions    escitalopram 10 MG tablet   Commonly known as:  LEXAPRO   Used for:  Mild major depression (H)   Started by:  Pat Hahn APRN CNP        Dose:  10 mg   Take 1 tablet (10 mg) by mouth daily   Quantity:  30 tablet   Refills:  0            Where to get your medicines      These medications were sent to [place] [id number] [address] [facility]  [address] [facility] [address]     Phone:  [phone]     escitalopram 10 MG tablet                Primary Care Provider Office Phone # Fax #    Osmar Rendon PA-C [phone] [address] [facility] [address]        Equal Access to Services     MISBAH DUARTE AH: Hadii mike ku hadasho Soomaali, waaxda luqadaha, qaybta kaalmada adeegyada, waxay alisiain haymun makayla de leon. So [facility] [phone].    ATENCIÓN: Si habla español, tiene a guallpa disposición servicios gratuitos de asistencia lingüística. Llame al [phone].    We comply with applicable federal civil rights laws and [place] laws. We do not discriminate on the basis of race, color, national origin, age, disability, sex, sexual orientation, or gender identity.            Thank you!     Thank you for choosing [facility]  for your care. Our goal is always to provide you with excellent care. Hearing back from our patients is one way we can continue to improve our services. Please take a few minutes to complete the written  survey that you may receive in the mail after your visit with us. Thank you!             Your Updated Medication List - Protect others around you: Learn how to safely use, store and throw away your medicines at www.disposemymeds.org.          This list is accurate as of: 12/29/17  4:15 PM.  Always use your most recent med list.                   Brand Name Dispense Instructions for use Diagnosis    escitalopram 10 MG tablet    LEXAPRO    30 tablet    Take 1 tablet (10 mg) by mouth daily    Mild major depression (H)       levothyroxine 112 MCG tablet    SYNTHROID/LEVOTHROID    90 tablet    TAKE 1 TABLET (112 MCG) BY MOUTH DAILY    Hypothyroidism due to acquired atrophy of thyroid       PRENATABS FA Tabs      Take  by mouth.        sertraline 50 MG tablet    ZOLOFT     Take 100 mg by mouth daily        VITAMIN D (CHOLECALCIFEROL) PO      Take 2,000 Units by mouth daily

## 2017-12-29 NOTE — PROGRESS NOTES
"HPI    SUBJECTIVE:   Bella Scott is a 32 year old female who presents to clinic today for the following health issues:      Medication Followup of Zoloft    Taking Medication as prescribed: NO-stopped taking 11/22/17    Side Effects:  Sleepy, still irritable, \"felt like a zombie\"    Medication Helping Symptoms:  NO     PHQ-9 SCORE 5/5/2017 11/10/2017 12/29/2017   Total Score 6 5 9     DESIREE-7 SCORE 10/13/2015 11/10/2017 12/29/2017   Total Score 2 6 8   Started on zoloft in the spring for postpartum depression.  At last visit dose was increased.  She reports it made her feel like a zombie and she just stopped taking it.  Tried zoloft due to breastfeeding.  Continues to breastfeed, but would like a different medication.        Tailbone       Duration: since pregnancy (9 month old)    Description (location/character/radiation): painful after sitting for periods of time, especially hard surfaces    Intensity:  7/10    Accompanying signs and symptoms: with trying to get off chair or repositioning    History (similar episodes/previous evaluation): None    Precipitating or alleviating factors: None    Therapies tried and outcome: None         Problem list and histories reviewed & adjusted, as indicated.  Additional history: as documented    Current Outpatient Prescriptions   Medication Sig Dispense Refill     escitalopram (LEXAPRO) 10 MG tablet Take 1 tablet (10 mg) by mouth daily 30 tablet 0     levothyroxine (SYNTHROID/LEVOTHROID) 112 MCG tablet TAKE 1 TABLET (112 MCG) BY MOUTH DAILY 90 tablet 3     VITAMIN D, CHOLECALCIFEROL, PO Take 2,000 Units by mouth daily       Prenatal Vit-Fe Fumarate-FA (PRENATABS FA) TABS Take  by mouth.       sertraline (ZOLOFT) 50 MG tablet Take 100 mg by mouth daily       Allergies   Allergen Reactions     No Known Allergies        Reviewed and updated as needed this visit by clinical staff  Tobacco  Allergies  Meds  Problems  Med Hx  Surg Hx  Fam Hx  Soc Hx        Reviewed and " updated as needed this visit by Provider  Allergies  Meds         ROS:  CONSTITUTIONAL:NEGATIVE for fever, chills, change in weight  INTEGUMENTARY/SKIN: NEGATIVE for worrisome rashes, moles or lesions  MUSCULOSKELETAL: POSITIVE  for coccyx pain   PSYCHIATRIC: POSITIVE for irritable, low mood    OBJECTIVE:     /70 (BP Location: Right arm, Patient Position: Sitting, Cuff Size: Adult Large)  Pulse 84  Temp 98.2  F (36.8  C) (Oral)  Resp 16  Wt 231 lb (104.8 kg)  Breastfeeding? Yes  BMI 35.12 kg/m2  Body mass index is 35.12 kg/(m^2).  GENERAL: healthy, alert and no distress  MS: no spinous or paraspinal muscle tenderness, normal ROM    SKIN: no suspicious lesions or rashes over low back/buttock region  PSYCH: mentation appears normal, affect normal/bright    Diagnostic Test Results:  Xray    ASSESSMENT/PLAN:   1. Pain in the coccyx  Ongoing since pregnancy.  No obvious cause on exam today.  Xray is normal.    - XR Sacrum and Coccyx 2 Views; Future    2. Mild major depression (H)  Poorly controlled, but off medication due to SE's to zoloft.  Will try lexapro; safe for breastfeeding.    - DEPRESSION ACTION PLAN (DAP)  - escitalopram (LEXAPRO) 10 MG tablet; Take 1 tablet (10 mg) by mouth daily  Dispense: 30 tablet; Refill: 0    F/u 1 mo    CECILIA Jones [facility]      Physical Exam

## 2017-12-30 ASSESSMENT — ANXIETY QUESTIONNAIRES: GAD7 TOTAL SCORE: 8

## 2018-01-30 DIAGNOSIS — E03.4 HYPOTHYROIDISM DUE TO ACQUIRED ATROPHY OF THYROID: ICD-10-CM

## 2018-01-30 NOTE — TELEPHONE ENCOUNTER
"Requested Prescriptions   Pending Prescriptions Disp Refills     levothyroxine (SYNTHROID/LEVOTHROID) 112 MCG tablet [Pharmacy Med Name: LEVOTHYROXINE 112 MCG TABLET]  Last Written Prescription Date:  11/14/2017  Last Fill Quantity: 90tablet,  # refills: 3   Last Office Visit with [facility] provider:  12/29/2017     Future Office Visit:    Next 5 appointments (look out 90 days)     Mar 05, 2018  3:30 PM CST   Return Visit with Jeanne Monroy LP   [facility] ([facility])    [address] [place] [address] [phone]                  90 tablet 0     Sig: TAKE 1 TABLET (112 MCG) BY MOUTH DAILY    Thyroid Protocol Passed    1/30/2018  1:59 AM       Passed - Patient is 12 years or older       Passed - Recent or future visit with authorizing provider's specialty    Patient had office visit in the last year or has a visit in the next 30 days with authorizing provider.  See \"Patient Info\" tab in inbasket, or \"Choose Columns\" in Meds & Orders section of the refill encounter.            Passed - Normal TSH on file in past 12 months    Recent Labs   Lab Test  11/10/17   1306   TSH  1.70             Passed - No active pregnancy on record    If patient is pregnant or has had a positive pregnancy test, please check TSH.         Passed - No positive pregnancy test in past 12 months    If patient is pregnant or has had a positive pregnancy test, please check TSH.            "

## 2018-01-31 RX ORDER — LEVOTHYROXINE SODIUM 112 UG/1
TABLET ORAL
Qty: 90 TABLET | Refills: 2 | Status: SHIPPED | OUTPATIENT
Start: 2018-01-31 | End: 2018-02-06

## 2018-01-31 NOTE — TELEPHONE ENCOUNTER
Prescription approved per [facility] Refill Protocol.    Jayleen ESPARZA RN, BSN, PHN  [facility]

## 2018-02-06 ENCOUNTER — OFFICE VISIT (OUTPATIENT)
Dept: FAMILY MEDICINE | Facility: CLINIC | Age: 33
End: 2018-02-06
Payer: COMMERCIAL

## 2018-02-06 ENCOUNTER — OFFICE VISIT (OUTPATIENT)
Dept: PSYCHOLOGY | Facility: CLINIC | Age: 33
End: 2018-02-06
Payer: COMMERCIAL

## 2018-02-06 VITALS
DIASTOLIC BLOOD PRESSURE: 72 MMHG | SYSTOLIC BLOOD PRESSURE: 116 MMHG | BODY MASS INDEX: 34.16 KG/M2 | WEIGHT: 230.6 LBS | HEART RATE: 88 BPM | HEIGHT: 69 IN | TEMPERATURE: 98.1 F

## 2018-02-06 DIAGNOSIS — M53.3 COCCYDYNIA: Primary | ICD-10-CM

## 2018-02-06 DIAGNOSIS — F32.0 MILD MAJOR DEPRESSION (H): ICD-10-CM

## 2018-02-06 DIAGNOSIS — E66.811 CLASS 1 OBESITY DUE TO EXCESS CALORIES WITHOUT SERIOUS COMORBIDITY WITH BODY MASS INDEX (BMI) OF 34.0 TO 34.9 IN ADULT: ICD-10-CM

## 2018-02-06 DIAGNOSIS — E66.09 CLASS 1 OBESITY DUE TO EXCESS CALORIES WITHOUT SERIOUS COMORBIDITY WITH BODY MASS INDEX (BMI) OF 34.0 TO 34.9 IN ADULT: ICD-10-CM

## 2018-02-06 DIAGNOSIS — F32.0 MILD MAJOR DEPRESSION (H): Primary | ICD-10-CM

## 2018-02-06 PROBLEM — E66.9 OBESITY: Status: ACTIVE | Noted: 2018-02-06

## 2018-02-06 PROCEDURE — 90791 PSYCH DIAGNOSTIC EVALUATION: CPT | Performed by: PSYCHOLOGIST

## 2018-02-06 PROCEDURE — 99214 OFFICE O/P EST MOD 30 MIN: CPT | Performed by: FAMILY MEDICINE

## 2018-02-06 ASSESSMENT — ANXIETY QUESTIONNAIRES
5. BEING SO RESTLESS THAT IT IS HARD TO SIT STILL: NOT AT ALL
3. WORRYING TOO MUCH ABOUT DIFFERENT THINGS: SEVERAL DAYS
2. NOT BEING ABLE TO STOP OR CONTROL WORRYING: SEVERAL DAYS
1. FEELING NERVOUS, ANXIOUS, OR ON EDGE: SEVERAL DAYS
7. FEELING AFRAID AS IF SOMETHING AWFUL MIGHT HAPPEN: SEVERAL DAYS
GAD7 TOTAL SCORE: 7
6. BECOMING EASILY ANNOYED OR IRRITABLE: NEARLY EVERY DAY

## 2018-02-06 ASSESSMENT — PATIENT HEALTH QUESTIONNAIRE - PHQ9: 5. POOR APPETITE OR OVEREATING: NOT AT ALL

## 2018-02-06 ASSESSMENT — ENCOUNTER SYMPTOMS
FEVER: 0
NEUROLOGICAL NEGATIVE: 1

## 2018-02-06 NOTE — NURSING NOTE
"Chief Complaint   Patient presents with     Tailbone Pain     Weight Loss       Initial /72 (BP Location: Right arm, Patient Position: Chair, Cuff Size: Adult Large)  Pulse 88  Temp 98.1  F (36.7  C) (Oral)  Ht 5' 8.75\" (1.746 m)  Wt 230 lb 9.6 oz (104.6 kg)  Breastfeeding? No  BMI 34.3 kg/m2 Estimated body mass index is 34.3 kg/(m^2) as calculated from the following:    Height as of this encounter: 5' 8.75\" (1.746 m).    Weight as of this encounter: 230 lb 9.6 oz (104.6 kg).  Medication Reconciliation: complete   Saray Jones CMA (AAMA)      "

## 2018-02-06 NOTE — MR AVS SNAPSHOT
After Visit Summary   2/6/2018    Bella Scott    MRN: [id number]           Patient Information     Date Of Birth          1985        Visit Information        Provider Department      2/6/2018 1:40 PM Moreno Zhong MD [facility]'s Diagnoses     Coccydynia    -  1    Mild major depression (H)        Class 1 obesity due to excess calories without serious comorbidity with body mass index (BMI) of 34.0 to 34.9 in adult           Follow-ups after your visit        Additional Services     NUTRITION REFERRAL       Your provider has referred you to: [facility]: [facility], [phone]  [email or web address]     Please be aware that coverage of these services is subject to the terms and limitations of your health insurance plan.  Call member services at your health plan with any benefit or coverage questions.      Please bring the following with you to your appointment:    (1) This referral request  (2) Any documents given to you regarding this referral  (3) Any specific questions you have about diet and/or food choices            ORTHO  REFERRAL       [facility] is referring you to the Orthopedic  Services at [facility].       The  Representative will assist you in the coordination of your Orthopedic and Musculoskeletal Care as prescribed by your physician.    The  Representative will call you within 1 business day to help schedule your appointment, or you may contact the  Representative at:    All areas ~ [phone]     Type of Referral : Surgical / Specialist       Timeframe requested: Routine    Coverage of these services is subject to the terms and limitations of your health insurance plan.  Please call member services at your health plan with any benefit or coverage questions.      If X-rays, CT or MRI's have been performed,  please contact the facility where they were done to arrange for , prior to your scheduled appointment.  Please bring this referral request to your appointment and present it to your specialist.                  Follow-up notes from your care team     Return in about 1 month (around 3/6/2018).      Your next 10 appointments already scheduled     Feb 27, 2018  9:30 AM CST   Return Visit with Jeanne Monroy LP   [facility]    [address] [facility] [address] [phone]            Mar 05, 2018  3:30 PM CST   Return Visit with Jeanne Monroy LP   [facility] ([place] [address] [facility] [address] [phone]              Who to contact     If you have questions or need follow up information about today's clinic visit or your schedule please contact [facility] directly at [phone].  Normal or non-critical lab and imaging results will be communicated to you by Sav[employer]art, letter or phone within 4 business days after the clinic has received the results. If you do not hear from us within 7 days, please contact the clinic through [employer] or phone. If you have a critical or abnormal lab result, we will notify you by phone as soon as possible.  Submit refill requests through [employer] or call your pharmacy and they will forward the refill request to us. Please allow 3 business days for your refill to be completed.          Additional Information About Your Visit        Savve[employer]ar[employer]inmotion Information     [employer] gives you secure access to your electronic health record. If you see a primary care provider, you can also send messages to your care team and make appointments. If you have questions, please call your primary care clinic.  If you do not have a primary care provider, please call [phone] and they will assist you.        Care EveryWhere ID     This is your Care EveryWhere ID. This could  "be used by other organizations to access your [place] medical records  [id number]        Your Vitals Were     Pulse Temperature Height Breastfeeding? BMI (Body Mass Index)       88 98.1  F (36.7  C) (Oral) 5' 8.75\" (1.746 m) No 34.3 kg/m2        Blood Pressure from Last 3 Encounters:   02/06/18 116/72   12/29/17 112/70   11/10/17 110/70    Weight from Last 3 Encounters:   02/06/18 230 lb 9.6 oz (104.6 kg)   12/29/17 231 lb (104.8 kg)   11/10/17 223 lb (101.2 kg)              We Performed the Following     NUTRITION REFERRAL     ORTHO  REFERRAL          Today's Medication Changes          These changes are accurate as of 2/6/18  2:31 PM.  If you have any questions, ask your nurse or doctor.               Start taking these medicines.        Dose/Directions    naltrexone-bupropion 8-90 MG per 12 hr tablet   Commonly known as:  CONTRAVE   Used for:  Class 1 obesity due to excess calories without serious comorbidity with body mass index (BMI) of 34.0 to 34.9 in adult   Started by:  Moreno Zhong MD        Dose:  1 tablet   Take 1 tablet by mouth 2 times daily   Quantity:  180 tablet   Refills:  1         Stop taking these medicines if you haven't already. Please contact your care team if you have questions.     escitalopram 10 MG tablet   Commonly known as:  LEXAPRO   Stopped by:  Moreno Zhong MD           sertraline 50 MG tablet   Commonly known as:  ZOLOFT   Stopped by:  Moreno Zhong MD                Where to get your medicines      Call your pharmacy to confirm that your medication is ready for pickup. It may take up to 24 hours for them to receive the prescription. If the prescription is not ready within 3 business days, please contact your clinic or your provider.     We will let you know when these medications are ready. If you don't hear back within 3 business days, please contact us.     naltrexone-bupropion 8-90 MG per 12 hr tablet                Primary Care Provider " Office Phone # Fax #    Osmar Rendon PA-C [phone]       [id number]  [address] [facility] [address]        Equal Access to Services     MISBAH DUARTE : Hadii aad ku hadrobsono Sodelmar, waaxda luqadaha, qaybta kaalmada adeegyada, kaila vega jocelynолег gamapriscilla de leon. So [facility] [phone].    ATENCIÓN: Si habla español, tiene a guallpa disposición servicios gratuitos de asistencia lingüística. Llame al [phone].    We comply with applicable federal civil rights laws and [place] laws. We do not discriminate on the basis of race, color, national origin, age, disability, sex, sexual orientation, or gender identity.            Thank you!     Thank you for choosing [facility]  for your care. Our goal is always to provide you with excellent care. Hearing back from our patients is one way we can continue to improve our services. Please take a few minutes to complete the written survey that you may receive in the mail after your visit with us. Thank you!             Your Updated Medication List - Protect others around you: Learn how to safely use, store and throw away your medicines at www.disposemymeds.org.          This list is accurate as of 2/6/18  2:31 PM.  Always use your most recent med list.                   Brand Name Dispense Instructions for use Diagnosis    levothyroxine 112 MCG tablet    SYNTHROID/LEVOTHROID    90 tablet    TAKE 1 TABLET (112 MCG) BY MOUTH DAILY    Hypothyroidism due to acquired atrophy of thyroid       naltrexone-bupropion 8-90 MG per 12 hr tablet    CONTRAVE    180 tablet    Take 1 tablet by mouth 2 times daily    Class 1 obesity due to excess calories without serious comorbidity with body mass index (BMI) of 34.0 to 34.9 in adult       PRENATABS FA Tabs      Take  by mouth.        VITAMIN D (CHOLECALCIFEROL) PO      Take 2,000 Units by mouth daily

## 2018-02-06 NOTE — MR AVS SNAPSHOT
After Visit Summary   2/6/2018    Bella Scott    MRN: [id number]           Patient Information     Date Of Birth          1985        Visit Information        Provider Department      2/6/2018 2:30 PM Jeanne Monroy LP [facility] Generic      Today's Diagnoses     Mild major depression (H)    -  1       Follow-ups after your visit        Your next 10 appointments already scheduled     Feb 27, 2018  9:30 AM CST   Return Visit with Jeanne Monroy LP   [facility] ([address] [address] [phone]            Mar 05, 2018  3:30 PM CST   Return Visit with Jeanne Monroy LP   [facility] ([address] [address] [phone]              Who to contact     If you have questions or need follow up information about today's clinic visit or your schedule please contact [facility] directly at [phone].  Normal or non-critical lab and imaging results will be communicated to you by [employer], letter or phone within 4 business days after the clinic has received the results. If you do not hear from us within 7 days, please contact the clinic through [employer] or phone. If you have a critical or abnormal lab result, we will notify you by phone as soon as possible.  Submit refill requests through [employer] or call your pharmacy and they will forward the refill request to us. Please allow 3 business days for your refill to be completed.          Additional Information About Your Visit        [employer] Solutions[employer] Information     [employer] gives you secure access to your electronic health record. If you see a primary care provider, you can also send messages to your care team and make appointments. If you have questions, please call your primary care clinic.  If you do not have a primary care provider, please call [phone]  and they will assist you.        Care EveryWhere ID     This is your Care EveryWhere ID. This could be used by other organizations to access your [place] medical records  [id number]         Blood Pressure from Last 3 Encounters:   02/06/18 116/72   12/29/17 112/70   11/10/17 110/70    Weight from Last 3 Encounters:   02/06/18 104.6 kg (230 lb 9.6 oz)   12/29/17 104.8 kg (231 lb)   11/10/17 101.2 kg (223 lb)              Today, you had the following     No orders found for display         Today's Medication Changes          These changes are accurate as of 2/6/18  3:24 PM.  If you have any questions, ask your nurse or doctor.               Start taking these medicines.        Dose/Directions    naltrexone-bupropion 8-90 MG per 12 hr tablet   Commonly known as:  CONTRAVE   Used for:  Class 1 obesity due to excess calories without serious comorbidity with body mass index (BMI) of 34.0 to 34.9 in adult   Started by:  Moreno Zhong MD        Dose:  1 tablet   Take 1 tablet by mouth 2 times daily   Quantity:  180 tablet   Refills:  1         Stop taking these medicines if you haven't already. Please contact your care team if you have questions.     escitalopram 10 MG tablet   Commonly known as:  LEXAPRO   Stopped by:  Moreno Zhong MD           sertraline 50 MG tablet   Commonly known as:  ZOLOFT   Stopped by:  Moreno Zhong MD                Where to get your medicines      Call your pharmacy to confirm that your medication is ready for pickup. It may take up to 24 hours for them to receive the prescription. If the prescription is not ready within 3 business days, please contact your clinic or your provider.     We will let you know when these medications are ready. If you don't hear back within 3 business days, please contact us.     naltrexone-bupropion 8-90 MG per 12 hr tablet                Primary Care Provider Office Phone # Fax #    Osmar Rendon PA-C [phone]        [id number]  [address] [facility] [address]        Equal Access to Services     MISBAH DUARTE : Hadii aad ku hadrobsonradha Sodelmar, waphucda joseadaha, qaninita rdfranciscoakbar ledesma, kaila thompsonlisandropriscilla de leon. So [facility] [phone].    ATENCIÓN: Si habla español, tiene a guallpa disposición servicios gratuitos de asistencia lingüística. Llame al [phone].    We comply with applicable federal civil rights laws and [place] laws. We do not discriminate on the basis of race, color, national origin, age, disability, sex, sexual orientation, or gender identity.            Thank you!     Thank you for choosing [facility]  for your care. Our goal is always to provide you with excellent care. Hearing back from our patients is one way we can continue to improve our services. Please take a few minutes to complete the written survey that you may receive in the mail after your visit with us. Thank you!             Your Updated Medication List - Protect others around you: Learn how to safely use, store and throw away your medicines at www.disposemymeds.org.          This list is accurate as of 2/6/18  3:24 PM.  Always use your most recent med list.                   Brand Name Dispense Instructions for use Diagnosis    levothyroxine 112 MCG tablet    SYNTHROID/LEVOTHROID    90 tablet    TAKE 1 TABLET (112 MCG) BY MOUTH DAILY    Hypothyroidism due to acquired atrophy of thyroid       naltrexone-bupropion 8-90 MG per 12 hr tablet    CONTRAVE    180 tablet    Take 1 tablet by mouth 2 times daily    Class 1 obesity due to excess calories without serious comorbidity with body mass index (BMI) of 34.0 to 34.9 in adult       PRENATABS FA Tabs      Take  by mouth.        VITAMIN D (CHOLECALCIFEROL) PO      Take 2,000 Units by mouth daily

## 2018-02-06 NOTE — PROGRESS NOTES
HPI    SUBJECTIVE:   Bella Scott is a 32 year old female who presents to clinic today for the following health issues:      Joint Pain    Onset: 1 year    Description:   Location: tailbone  Character: Sharp and pressure; more and more painful with sitting; very painful upon standing    Intensity: 8-9/10    Progression of Symptoms: intermittent pain levels, consistent daily    Accompanying Signs & Symptoms:  Other symptoms: none    History:   Previous similar pain: no       Precipitating factors:   Trauma or overuse: YES- started during pregnancy and hasn't gone away    Alleviating factors:  Improved by: rest/inactivity, lying down    Therapies Tried and outcome: Lying down      Tail bone pain started during last pregnancy - about 1-1/2 years ago.  Was seen for this about one month ago.  Was set up with PT but hasn't started this as she feels that it won't be helpful.  Sitting is most painful, moving (especially on hard chairs), standing, walking is much less so.  Pain is worth with standing up.  No swelling, d/c, point tenderness.  Feels deeper than skin level.    Questions about weight loss.  Currently the heaviest she's been, but has struggled with weight for a while.  Gained a bunch of weight during last pregnancy, hasn't been able to loose.  Wondering about phentermine.  REcently on zoloft, didn't find it helpful on the whole.  Does have low thyroid and last seen by mell in 11/17.  Is in the process of weaning son within the next couple of weeks.    Notably depressed, tearful.  Has known depression, not currently taking srtraline - did use for about 7-10 days and stopped because it made her feel lethargic.  No thoughts of self harm.    Review of Systems   Constitutional: Negative for fever.   Musculoskeletal: Positive for joint pain.   Neurological: Negative.    Psychiatric/Behavioral: Positive for depression. Negative for suicidal ideas. The patient does not have insomnia.          Physical Exam    Constitutional: She is well-developed, well-nourished, and in no distress.   Musculoskeletal:   Sharp angle a tip of coccyx, no flocculence, redness or swelling   Skin: Skin is warm and dry.   Psychiatric: Affect normal. She exhibits a depressed mood.   Vitals reviewed.    (M53.3) Coccydynia  (primary encounter diagnosis)  Comment: Did encourage PT as previously ordered.  Advised that tailbone pain can be tricky and that full resolution is elusive.  Will refer to ortho for further eval  Plan: ORTHO  REFERRAL            (F32.0) Mild major depression (H)  Comment:   Plan: starting on cntrave for weight, may hopefully get some secondary mood benefit from buproprion in contrave.  Consider adding separate dose of Wellbutrin.    (E66.09,  Z68.34) Class 1 obesity due to excess calories without serious comorbidity with body mass index (BMI) of 34.0 to 34.9 in adult  Comment: emphasized principle role of calorie reduction in weight loss, referred for nutironal counselling, starting Contrave.  Plan: naltrexone-bupropion (CONTRAVE) 8-90 MG per 12         hr tablet, NUTRITION REFERRAL              RTC in     Moreno Zhong MD
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
## 2018-02-07 ASSESSMENT — ANXIETY QUESTIONNAIRES: GAD7 TOTAL SCORE: 7

## 2018-02-07 ASSESSMENT — ENCOUNTER SYMPTOMS
DEPRESSION: 1
INSOMNIA: 0

## 2018-02-07 ASSESSMENT — PATIENT HEALTH QUESTIONNAIRE - PHQ9: SUM OF ALL RESPONSES TO PHQ QUESTIONS 1-9: 9

## 2018-02-09 ENCOUNTER — TELEPHONE (OUTPATIENT)
Dept: FAMILY MEDICINE | Facility: CLINIC | Age: 33
End: 2018-02-09

## 2018-02-13 ENCOUNTER — OFFICE VISIT (OUTPATIENT)
Dept: PSYCHOLOGY | Facility: CLINIC | Age: 33
End: 2018-02-13
Payer: COMMERCIAL

## 2018-02-13 DIAGNOSIS — F32.0 MILD MAJOR DEPRESSION (H): Primary | ICD-10-CM

## 2018-02-13 PROCEDURE — 90834 PSYTX W PT 45 MINUTES: CPT | Performed by: PSYCHOLOGIST

## 2018-02-13 NOTE — PROGRESS NOTES
"                                             Progress Note    Client Name: Bella Scott  Date: 2-13-18         Service Type: Individual      Session Start Time: 5:30  Session End Time: 6:20      Session Length: 38-52 min     Session #: 2     Attendees: Client    Treatment Plan Last Reviewed: today  PHQ-9 / DESIREE-7 : see flow sheets.     DATA      Progress Since Last Session (Related to Symptoms / Goals / Homework):   Symptoms: Stable but with feeling anxious , irritable, low energy, low mood. Feeling bad about self.    Homework: Completed in session      Episode of Care Goals: Satisfactory progress - ACTION (Actively working towards change); Intervened by reinforcing change plan / affirming steps taken     Current / Ongoing Stressors and Concerns:        Treatment Objective(s) Addressed in This Session:   use at least 6 coping skills for anxiety management in the next 16 weeks       Intervention:   Motivational Interviewing: .     Explored daily stressors:  Explored concerns about anxiety.  Has a worry about weight.  Will be reading \"The Compound Effect\".  Has questions about the 5 Love Languages.       ASSESSMENT: Current Emotional / Mental Status (status of significant symptoms):   Risk status (Self / Other harm or suicidal ideation)   Client denies current fears or concerns for personal safety.   Client denies current or recent suicidal ideation or behaviors.   Client denies current or recent homicidal ideation or behaviors.   Client denies current or recent self injurious behavior or ideation.   Client denies other safety concerns.   A safety and risk management plan has not been developed at this time, however client was given the after-hours number / 911 should there be a change in any of these risk factors.     Appearance:   Appropriate    Eye Contact:   Good    Psychomotor Behavior: Normal    Attitude:   Cooperative    Orientation:   All   Speech    Rate / Production: Normal     Volume:  Normal "    Mood:    Anxious  Normal   Affect:    Appropriate    Thought Content:  Clear    Thought Form:  Coherent  Logical    Insight:    Good      Medication Review:   No changes to current psychiatric medication(s)     Medication Compliance:   Yes     Changes in Health Issues:   None reported     Chemical Use Review:   Substance Use: Chemical use reviewed, no active concerns identified      Tobacco Use: No current tobacco use.       Collateral Reports Completed:   Routed note to PCP as needed.    PLAN: (Client Tasks / Therapist Tasks / Other)    Start the breath to calm system b4 eating.  Raise heart rate for 5 min    Jeanne Monroy LP                                                         ________________________________________________________________________    Treatment Plan    Client's Name: Bella Scott  YOB: 1985    Date: 2-13-18    DSM-V Diagnoses: MDD    Psychosocial / Contextual Factors: multiple stressors  WHODAS: see dx    Referral / Collaboration:  Referral to another professional/service is not indicated at this time.    Anticipated number of session or this episode of care: 12      MeasurableTreatment Goal(s) related to diagnosis / functional impairment(s)  Goal 1: Client will understand core triggers and learn to use 3-6 skills consistantly to mediate anxiety and stress    I will know I've met my goal when .  50% less anxiety    Objective #A (Client Action)    Client will use at least 3-6 coping skills for anxiety management in the next 16 weeks.  Status: New - Date: 10-10-17     Goal 2: Client will create a realistic small daily steps in her  plan to address weight.    I will know I've met my goal when .  has 3 consistent habits    Objective #A (Client Action)    Client will use at least 3-6 coping skills for anxiety management in the next 16 weeks.  Status: New - Date: 10-10-17       Client has reviewed and agreed to the above plan.      Jeanne Monroy LP  February 13, 2018

## 2018-02-13 NOTE — MR AVS SNAPSHOT
After Visit Summary   2/13/2018    Bella Scott    MRN: [id number]           Patient Information     Date Of Birth          1985        Visit Information        Provider Department      2/13/2018 5:30 PM Jeanne Monroy LP [facility] Generic      Today's Diagnoses     Mild major depression (H)    -  1       Follow-ups after your visit        Your next 10 appointments already scheduled     Feb 22, 2018 11:40 AM CST   New Visit with CECILIA Goncalves CNP   [facility] ([facility])    [address]  [facility] [id number] [phone]            Feb 22, 2018  1:30 PM CST   Diabetic Education with [place] [facility]   [facility] ([facility])    [address]  [facility] [id number] [phone]            Mar 05, 2018  3:30 PM CST   Return Visit with Jeanne Monroy LP   [facility] ([address] [address] [phone]            Mar 20, 2018  3:30 PM CDT   Return Visit with Jeanne Monroy LP   [facility] ([address] [address] [phone]              Who to contact     If you have questions or need follow up information about today's clinic visit or your schedule please contact [facility] directly at [phone].  Normal or non-critical lab and imaging results will be communicated to you by MyChart, letter or phone within 4 business days after the clinic has received the results. If you do not hear from us within 7 days, please contact the clinic through [employer] or phone. If you have a critical or abnormal lab result, we will notify you by phone as soon as possible.  Submit refill requests through [employer] or call your pharmacy and they will forward the  refill request to us. Please allow 3 business days for your refill to be completed.          Additional Information About Your Visit        MyChart Information     [employer] gives you secure access to your electronic health record. If you see a primary care provider, you can also send messages to your care team and make appointments. If you have questions, please call your primary care clinic.  If you do not have a primary care provider, please call [phone] and they will assist you.        Care EveryWhere ID     This is your Care EveryWhere ID. This could be used by other organizations to access your [place] medical records  [id number]         Blood Pressure from Last 3 Encounters:   02/06/18 116/72   12/29/17 112/70   11/10/17 110/70    Weight from Last 3 Encounters:   02/06/18 104.6 kg (230 lb 9.6 oz)   12/29/17 104.8 kg (231 lb)   11/10/17 101.2 kg (223 lb)              Today, you had the following     No orders found for display       Primary Care Provider Office Phone # Fax #    Osmar Rendon PA-C [phone]       [id number]  [facility] [address]        Equal Access to Services     MISBAH DUARTE : Hadii aad ku hadasho Soomaali, waaxda luqadaha, qaybta kaalmada adeegyada, waxay alisiain haymun makayla de leon. So [facility] [phone].    ATENCIÓN: Si habla español, tiene a guallpa disposición servicios gratuitos de asistencia lingüística. Roque al [phone].    We comply with applicable federal civil rights laws and [place] laws. We do not discriminate on the basis of race, color, national origin, age, disability, sex, sexual orientation, or gender identity.            Thank you!     Thank you for choosing [facility]  for your care. Our goal is always to provide you with excellent care. Hearing back from our patients is one way we can continue to improve our services. Please take a few minutes to complete the written survey that you may receive in  the mail after your visit with us. Thank you!             Your Updated Medication List - Protect others around you: Learn how to safely use, store and throw away your medicines at www.disposemymeds.org.          This list is accurate as of 2/13/18  6:33 PM.  Always use your most recent med list.                   Brand Name Dispense Instructions for use Diagnosis    levothyroxine 112 MCG tablet    SYNTHROID/LEVOTHROID    90 tablet    TAKE 1 TABLET (112 MCG) BY MOUTH DAILY    Hypothyroidism due to acquired atrophy of thyroid       naltrexone-bupropion 8-90 MG per 12 hr tablet    CONTRAVE    180 tablet    Take 1 tablet by mouth 2 times daily    Class 1 obesity due to excess calories without serious comorbidity with body mass index (BMI) of 34.0 to 34.9 in adult       PRENATABS FA Tabs      Take  by mouth.        VITAMIN D (CHOLECALCIFEROL) PO      Take 2,000 Units by mouth daily

## 2018-02-22 ENCOUNTER — ALLIED HEALTH/NURSE VISIT (OUTPATIENT)
Dept: EDUCATION SERVICES | Facility: CLINIC | Age: 33
End: 2018-02-22
Payer: COMMERCIAL

## 2018-02-22 ENCOUNTER — OFFICE VISIT (OUTPATIENT)
Dept: NEUROSURGERY | Facility: CLINIC | Age: 33
End: 2018-02-22
Attending: NURSE PRACTITIONER
Payer: COMMERCIAL

## 2018-02-22 VITALS
DIASTOLIC BLOOD PRESSURE: 85 MMHG | WEIGHT: 230 LBS | HEART RATE: 82 BPM | HEIGHT: 69 IN | BODY MASS INDEX: 34.07 KG/M2 | OXYGEN SATURATION: 96 % | SYSTOLIC BLOOD PRESSURE: 115 MMHG

## 2018-02-22 DIAGNOSIS — M53.3 COCCYDYNIA: Primary | ICD-10-CM

## 2018-02-22 DIAGNOSIS — E66.811 CLASS 1 OBESITY DUE TO EXCESS CALORIES WITHOUT SERIOUS COMORBIDITY WITH BODY MASS INDEX (BMI) OF 34.0 TO 34.9 IN ADULT: Primary | ICD-10-CM

## 2018-02-22 DIAGNOSIS — E66.09 CLASS 1 OBESITY DUE TO EXCESS CALORIES WITHOUT SERIOUS COMORBIDITY WITH BODY MASS INDEX (BMI) OF 34.0 TO 34.9 IN ADULT: Primary | ICD-10-CM

## 2018-02-22 PROCEDURE — G0463 HOSPITAL OUTPT CLINIC VISIT: HCPCS | Performed by: NURSE PRACTITIONER

## 2018-02-22 PROCEDURE — 99203 OFFICE O/P NEW LOW 30 MIN: CPT | Performed by: NURSE PRACTITIONER

## 2018-02-22 PROCEDURE — 97802 MEDICAL NUTRITION INDIV IN: CPT

## 2018-02-22 ASSESSMENT — PAIN SCALES - GENERAL: PAINLEVEL: MODERATE PAIN (4)

## 2018-02-22 NOTE — PATIENT INSTRUCTIONS
1. Establish an exercise habit.    2. My Fitness Pal luis for tracking food/exercise - calorie goal is [id number]  3. Omit or reduce cream in coffee.  4. Try to not drink juice or sugary drinks.  Skim milk with meals is fine.  Water, flavored water, zero calorie drinks.  5. Meal planning.  6. Follow-up in 1-2 months if needed.

## 2018-02-22 NOTE — MR AVS SNAPSHOT
After Visit Summary   2/22/2018    Bella Scott    MRN: [id number]           Patient Information     Date Of Birth          1985        Visit Information        Provider Department      2/22/2018 1:30 PM [facility] [place]        Care Instructions    1. Establish an exercise habit.    2. My Fitness Pal lusi for tracking food/exercise - calorie goal is [id number]  3. Omit or reduce cream in coffee.  4. Try to not drink juice or sugary drinks.  Skim milk with meals is fine.  Water, flavored water, zero calorie drinks.  5. Meal planning.  6. Follow-up in 1-2 months if needed.          Follow-ups after your visit        Your next 10 appointments already scheduled     Mar 05, 2018  3:30 PM CST   Return Visit with Jeanne Monroy LP   [address] [address] [phone]            Mar 20, 2018  3:30 PM CDT   Return Visit with Jeanne Monroy LP   [address] [address] [phone]              Who to contact     If you have questions or need follow up information about today's clinic visit or your schedule please contact [facility] directly at [phone].  Normal or non-critical lab and imaging results will be communicated to you by MyChart, letter or phone within 4 business days after the clinic has received the results. If you do not hear from us within 7 days, please contact the clinic through MyChart or phone. If you have a critical or abnormal lab result, we will notify you by phone as soon as possible.  Submit refill requests through "[employer]" or call your pharmacy and they will forward the refill request to us. Please allow 3 business days for your refill to be completed.          Additional Information About Your Visit        MyChart Information     [employer]  gives you secure access to your electronic health record. If you see a primary care provider, you can also send messages to your care team and make appointments. If you have questions, please call your primary care clinic.  If you do not have a primary care provider, please call [phone] and they will assist you.        Care EveryWhere ID     This is your Care EveryWhere ID. This could be used by other organizations to access your [place] medical records  [id number]         Blood Pressure from Last 3 Encounters:   02/22/18 115/85   02/06/18 116/72   12/29/17 112/70    Weight from Last 3 Encounters:   02/22/18 104.3 kg (230 lb)   02/06/18 104.6 kg (230 lb 9.6 oz)   12/29/17 104.8 kg (231 lb)              Today, you had the following     No orders found for display       Primary Care Provider Office Phone # Fax #    Osmar Cindy Rendon PA-C [phone]       [id number]  [facility] [address]        Equal Access to Services     [facility]: Hadii aad ku hadasho Soomaali, waaxda luqadaha, qaybta kaalmada adeegyada, waxay idiin hayaan makayla france . So [facility] [phone].    ATENCIÓN: Si habla español, tiene a guallpa disposición servicios gratuitos de asistencia lingüística. Llame al [phone].    We comply with applicable federal civil rights laws and [place] laws. We do not discriminate on the basis of race, color, national origin, age, disability, sex, sexual orientation, or gender identity.            Thank you!     Thank you for choosing [facility]  for your care. Our goal is always to provide you with excellent care. Hearing back from our patients is one way we can continue to improve our services. Please take a few minutes to complete the written survey that you may receive in the mail after your visit with us. Thank you!             Your Updated Medication List - Protect others around you: Learn how to safely use, store and throw away your  medicines at www.disposemymeds.org.          This list is accurate as of 2/22/18  2:45 PM.  Always use your most recent med list.                   Brand Name Dispense Instructions for use Diagnosis    levothyroxine 112 MCG tablet    SYNTHROID/LEVOTHROID    90 tablet    TAKE 1 TABLET (112 MCG) BY MOUTH DAILY    Hypothyroidism due to acquired atrophy of thyroid       naltrexone-bupropion 8-90 MG per 12 hr tablet    CONTRAVE    180 tablet    Take 1 tablet by mouth 2 times daily    Class 1 obesity due to excess calories without serious comorbidity with body mass index (BMI) of 34.0 to 34.9 in adult       PRENATABS FA Tabs      Take  by mouth.        VITAMIN D (CHOLECALCIFEROL) PO      Take 2,000 Units by mouth daily

## 2018-02-22 NOTE — PROGRESS NOTES
Dr. Barry Gomes  [facility]  Neurosurgery Clinic Visit        CC: tailbone pain    Primary care Provider: Osmar Rendon      Reason For Visit:   I was asked by Dr. Rendon to consult on the patient for tailbone pain.      HPI: Bella Scott is a 32 year old female with tailbone pain. She notes that this began about 1.5 years ago when she got pregnant.  Post delivery she still had the pain. She notes it is constant with sitting. It goes away with lying down.  She notes that it does not radiate and stays in the coccyx area. She states that it is very bothersome.  She denies any issues with her bowel or bladder.  She has not had PT or injections for her pain.  She currently works as a NICU nurse.      Pain at its worst 10  Pain right now:  4    Past Medical History:   Diagnosis Date     Anemia, unspecified 3/24/2003    iron deficiney per pt     Contraception      Unspecified hypothyroidism        Past Medical History reviewed with patient during visit.    Past Surgical History:   Procedure Laterality Date      SECTION  2014    Procedure:  SECTION;;  Surgeon: Lisa Steele MD;  Location: [facility]      SECTION N/A 3/14/2017    Procedure:  SECTION;  Surgeon: Lucie Jackson MD;  Location: [facility]     HC EXC BENIGN SKIN LESION SCLP/NCK/HNDS/FEET/GEN 0.6-1.0 CM  08    RT Thumb     HC REPAIR OF NAIL BED  08    RT Thumb-benign     Past Surgical History reviewed with patient during visit.    Current Outpatient Prescriptions   Medication     naltrexone-bupropion (CONTRAVE) 8-90 MG per 12 hr tablet     levothyroxine (SYNTHROID/LEVOTHROID) 112 MCG tablet     VITAMIN D, CHOLECALCIFEROL, PO     Prenatal Vit-Fe Fumarate-FA (PRENATABS FA) TABS     No current facility-administered medications for this visit.      Facility-Administered Medications Ordered in Other Visits   Medication     fentaNYL (SUBLIMAZE) injection     ROPivacaine (NAROPIN) epidural      lidocaine-EPINEPHrine 1.5 %-1:200000 injection       Allergies   Allergen Reactions     No Known Allergies        Social History     Social History     Marital status:      Spouse name: N/A     Number of children: 0     Years of education: 16     Occupational History     student      [employer]     Social History Main Topics     Smoking status: Never Smoker     Smokeless tobacco: Never Used      Comment: no 2nd hand smoke at home     Alcohol use 0.0 oz/week     0 Standard drinks or equivalent per week      Comment: once a month     Drug use: No     Sexual activity: Yes     Partners: Male     Birth control/ protection: Condom     Other Topics Concern      Service No     Blood Transfusions No     Caffeine Concern Yes     1 soda per day     Sleep Concern No     Stress Concern No     Weight Concern No     Special Diet No     Exercise Yes     Seat Belt Yes     Self-Exams No     once in a while     Parent/Sibling W/ Cabg, Mi Or Angioplasty Before 65f 55m? Yes     paternal grandmother, multiple bypass     Social History Narrative     2/10 , working fulltime RN at [employer], no kids, no pets       Family History   Problem Relation Age of Onset     Thyroid Disease Mother      Family History Negative Father      CANCER Maternal Grandmother      Ovarian Cancer in her late 50s     DIABETES Maternal Grandfather      kidney transplant 11/07-diabetes related     Other - See Comments Paternal Grandmother      PVD and uncle on paternal side      HEART DISEASE Paternal Grandfather      MI-at age of 60's     Thyroid Disease Sister      1-underactive in thyroid, not cancerous      Family History Negative Brother      1     Breast Cancer No family hx of      CEREBROVASCULAR DISEASE No family hx of      Cancer - colorectal No family hx of          Review Of Systems  Skin: negative  Eyes: negative  Ears/Nose/Throat: negative  Respiratory: No shortness of breath, dyspnea on exertion, cough, or  "hemoptysis  Cardiovascular: negative  Gastrointestinal: negative  Genitourinary: negative  Musculoskeletal: back pain  Neurologic: negative  Psychiatric: negative  Hematologic/Lymphatic/Immunologic: negative  Endocrine: thyroid disorder     ROS: 10 point ROS neg other than the symptoms noted above in the HPI.    Vital Signs: /85 (BP Location: Right arm, Patient Position: Sitting, Cuff Size: Adult Large)  Pulse 82  Ht 5' 8.75\" (1.746 m)  Wt 230 lb (104.3 kg)  SpO2 96%  BMI 34.21 kg/m2    Examination:  Constitutional:  Alert, well nourished, NAD.  Memory: recent and remote memory intact   HEENT: Normocephalic, atraumatic.   Pulm:  Without shortness of breath   CV:  No pitting edema of BLE.    Neurological:  Awake  Alert  Oriented x 3  Speech clear  Cranial nerves II - XII intact  PERRL  EOMI  Face symmetric  Tongue midline  Motor exam   Shoulder Abduction:  Right:  5/5   Left:  5/5  Biceps:                      Right:  5/5   Left:  5/5  Triceps:                     Right:  5/5   Left:  5/5  Wrist Extensors:       Right:  5/5   Left:  5/5  Wrist Flexors:           Right:  5/5   Left:  5/5  Intrinsics:                   Right:  5/5   Left:  5/5   Hip Flexor:                Right: 5/5  Left:  5/5  Hip Adductor:             Right:  5/5  Left:  5/5  Hip Abductor:             Right:  5/5  Left:  5/5  Gastroc Soleus:        Right:  5/5  Left:  5/5  Tib/Ant:                      Right:  5/5  Left:  5/5  EHL:                          Right:  5/5  Left:  5/5   Sensation normal to bilateral upper and lower extremities  Muscle tone to bilateral upper and lower extremities normal  Gait: Able to stand from a seated position. Normal non-antalgic, non-myelopathic gait.  Able to heel/toe walk without loss of balance    Lumbar examination reveals no tenderness of the spine or paraspinous muscles.  Hip height is symmetrical. Negative SI joint, sciatic notch or greater trochanteric tenderness to palpation bilaterally.  " Straight leg raise is negative bilaterally.      Pt notes pain with palpation to the coccyx area only.      Imaging:     Coccyx xray    IMPRESSION: Alignment throughout the sacrococcygeal segments appears  normal. There is no evidence for fracture or dislocation.        Assessment/Plan:    Bella Scott is a 32 year old female with tailbone pain. She notes that this began about 1.5 years ago when she got pregnant.  Post delivery she still had the pain. She notes it is constant with sitting. It goes away with lying down.  She notes that it does not radiate and stays in the coccyx area. She states that it is very bothersome.  She denies any issues with her bowel or bladder.  She has not had PT or injections for her pain.  She currently works as a NICU nurse.  Pt is neurologically intact during exam. She only reports localized tailbone pain with palpation. At this time we discussed that she could try [facility] chiropractic care for acupuncture and muscle work.  If pain persists we would then recommend a caudal epidural injection. She is open to this.     Patient Instructions   1. Schedule with Dr. Cande Adkins at [facility]    2. Please contact the clinic if pain persists at [phone]. Then we will try injections and possible MRI.               Chelsea Hi [facility]  [facility]ine [facility] Clinic  [address]  [address] [place] [address]    Tel [phone]  Pager [phone]

## 2018-02-22 NOTE — PATIENT INSTRUCTIONS
1. Schedule with Dr. Cande Adkins at [facility]    2. Please contact the clinic if pain persists at [phone]. Then we will try injections and possible MRI.

## 2018-02-22 NOTE — MR AVS SNAPSHOT
After Visit Summary   2/22/2018    Bella Scott    MRN: [id number]           Patient Information     Date Of Birth          1985        Visit Information        Provider Department      2/22/2018 11:40 AM Chelsea Hi APRN CNP [facility]        Today's Diagnoses     Coccydynia    -  1      Care Instructions    1. Schedule with Dr. Cande Adkins at [facility]    2. Please contact the clinic if pain persists at [phone]. Then we will try injections and possible MRI.            Follow-ups after your visit        Additional Services     [facility] PT, HAND, AND CHIROPRACTIC REFERRAL       **This order will print in the [facility] Scheduling Office**  Dr. Cande Adkins       Physical Therapy, Hand Therapy and Chiropractic Care are available through:    *[facility]  *[facility] [place]  *[facility]    Call one number to schedule at any of the above locations: [phone].    Your provider has referred you to: Chiropractic at [facility] or [facility]    Indication/Reason for Referral: coccyx  Onset of Illness: chronic    Therapy Orders: Evaluate and Treat  Special Programs: Acupuncture and None  Special Request: Manual Therapy: Myofascial Release/Massage  None    Rojas Xie      Additional Comments for the Therapist or Chiropractor:     Please be aware that coverage of these services is subject to the terms and limitations of your health insurance plan.  Call member services at your health plan with any benefit or coverage questions.      Please bring the following to your appointment:    *Your personal calendar for scheduling future appointments  *Comfortable clothing                  Your next 10 appointments already scheduled     Feb 22, 2018  1:30 PM CST   Diabetic Education with [facility]   [facility] [place] ([facility])    [address] [facility] [address]  [facility] [address]  "  [phone]            Mar 05, 2018  3:30 PM CST   Return Visit with Jeanne Monroy LP   [facility]    [address] [facility] [address] [phone]            Mar 20, 2018  3:30 PM CDT   Return Visit with Jeanne Monroy LP   [facility] ([facility]    [address] [facility] [address] [phone]              Who to contact     If you have questions or need follow up information about today's clinic visit or your schedule please contact [facility] directly at [phone].  Normal or non-critical lab and imaging results will be communicated to you by [employer], letter or phone within 4 business days after the clinic has received the results. If you do not hear from us within 7 days, please contact the clinic through [employer] or phone. If you have a critical or abnormal lab result, we will notify you by phone as soon as possible.  Submit refill requests through [employer] or call your pharmacy and they will forward the refill request to us. Please allow 3 business days for your refill to be completed.          Additional Information About Your Visit        [employer] Information     [employer] gives you secure access to your electronic health record. If you see a primary care provider, you can also send messages to your care team and make appointments. If you have questions, please call your primary care clinic.  If you do not have a primary care provider, please call [phone] and they will assist you.        Care EveryWhere ID     This is your Care EveryWhere ID. This could be used by other organizations to access your [place] medical records  [id number]        Your Vitals Were     Pulse Height Pulse Oximetry BMI (Body Mass Index)          82 5' 8.75\" (1.746 m) 96% 34.21 kg/m2         Blood Pressure from Last 3 Encounters:   02/22/18 115/85   02/06/18 116/72   12/29/17 112/70    Weight " from Last 3 Encounters:   02/22/18 230 lb (104.3 kg)   02/06/18 230 lb 9.6 oz (104.6 kg)   12/29/17 231 lb (104.8 kg)              We Performed the Following     [facility] PT, HAND, AND CHIROPRACTIC REFERRAL        Primary Care Provider Office Phone # Fax #    Osmar Rendon PA-C [phone]       [id number]  [facility] [address]        Equal Access to Services     [facility] DUAR[facility] : Hadii aad ku hadasho Soomaali, waaxda luqadaha, qaybta kaalmada adeegyada, waxay idiin hayaan adeeg kharash latatiana . So [facility] [phone].    ATENCIÓN: Si habla español, tiene a guallpa disposición servicios gratuitos de asistencia lingüística. [facility] [phone].    We comply with applicable federal civil rights laws and [place] laws. We do not discriminate on the basis of race, color, national origin, age, disability, sex, sexual orientation, or gender identity.            Thank you!     Thank you for choosing [facility]  for your care. Our goal is always to provide you with excellent care. Hearing back from our patients is one way we can continue to improve our services. Please take a few minutes to complete the written survey that you may receive in the mail after your visit with us. Thank you!             Your Updated Medication List - Protect others around you: Learn how to safely use, store and throw away your medicines at www.disposemymeds.org.          This list is accurate as of 2/22/18 11:56 AM.  Always use your most recent med list.                   Brand Name Dispense Instructions for use Diagnosis    levothyroxine 112 MCG tablet    SYNTHROID/LEVOTHROID    90 tablet    TAKE 1 TABLET (112 MCG) BY MOUTH DAILY    Hypothyroidism due to acquired atrophy of thyroid       naltrexone-bupropion 8-90 MG per 12 hr tablet    CONTRAVE    180 tablet    Take 1 tablet by mouth 2 times daily    Class 1 obesity due to excess calories without serious comorbidity with body mass index (BMI) of 34.0 to  34.9 in adult       PRENATABS FA Tabs      Take  by mouth.        VITAMIN D (CHOLECALCIFEROL) PO      Take 2,000 Units by mouth daily

## 2018-02-22 NOTE — NURSING NOTE
"Bella Scott is a 32 year old female who presents for:  Chief Complaint   Patient presents with     Neurologic Problem     Coccydynia, started a year and a half ago after her child was born         Initial Vitals:  /85 (BP Location: Right arm, Patient Position: Sitting, Cuff Size: Adult Large)  Pulse 82  Ht 5' 8.75\" (1.746 m)  Wt 230 lb (104.3 kg)  SpO2 96%  BMI 34.21 kg/m2 Estimated body mass index is 34.21 kg/(m^2) as calculated from the following:    Height as of this encounter: 5' 8.75\" (1.746 m).    Weight as of this encounter: 230 lb (104.3 kg).. Body surface area is 2.25 meters squared. BP completed using cuff size: large  Moderate Pain (4)    Do you feel safe in your environment?  Yes  Do you need any refills today? No    Nursing Comments: Coccydynia, started a year and a half ago after her child was born.        5 min. nursing intake time  Josselin Phipps MA       Discharge plan: 1. Schedule with Dr. Cande Adkins at [facility]    2. Please contact the clinic if pain persists at [phone]. Then we will try injections and possible MRI.  2 min. nursing discharge time  Josselin Phipps MA        "

## 2018-02-22 NOTE — LETTER
2018         RE: Bella Scott  [address]  [facility] [address]        Dear Colleague,    Thank you for referring your patient, Bella Scott, to the [facility]. Please see a copy of my visit note below.    Dr. Barry Gomes  [facility]  Neurosurgery Clinic Visit        CC: tailbone pain    Primary care Provider: Osmar Rendon      Reason For Visit:   I was asked by Dr. Rendon to consult on the patient for tailbone pain.      HPI: Bella Scott is a 32 year old female with tailbone pain. She notes that this began about 1.5 years ago when she got pregnant.  Post delivery she still had the pain. She notes it is constant with sitting. It goes away with lying down.  She notes that it does not radiate and stays in the coccyx area. She states that it is very bothersome.  She denies any issues with her bowel or bladder.  She has not had PT or injections for her pain.  She currently works as a NICU nurse.      Pain at its worst 10  Pain right now:  4    Past Medical History:   Diagnosis Date     Anemia, unspecified 3/24/2003    iron deficiney per pt     Contraception      Unspecified hypothyroidism        Past Medical History reviewed with patient during visit.    Past Surgical History:   Procedure Laterality Date      SECTION  2014    Procedure:  SECTION;;  Surgeon: Lisa Steele MD;  Location: [facility] L+D      SECTION N/A 3/14/2017    Procedure:  SECTION;  Surgeon: Lucie Jackson MD;  Location:  L+D     HC EXC BENIGN SKIN LESION SCLP/NCK/HNDS/FEET/GEN 0.6-1.0 CM  08    RT Thumb     HC REPAIR OF NAIL BED  08    RT Thumb-benign     Past Surgical History reviewed with patient during visit.    Current Outpatient Prescriptions   Medication     naltrexone-bupropion (CONTRAVE) 8-90 MG per 12 hr tablet     levothyroxine (SYNTHROID/LEVOTHROID) 112 MCG tablet     VITAMIN D, CHOLECALCIFEROL, PO     Prenatal Vit-Fe  Fumarate-FA (PRENATABS FA) TABS     No current facility-administered medications for this visit.      Facility-Administered Medications Ordered in Other Visits   Medication     fentaNYL (SUBLIMAZE) injection     ROPivacaine (NAROPIN) epidural     lidocaine-EPINEPHrine 1.5 %-[id number] injection       Allergies   Allergen Reactions     No Known Allergies        Social History     Social History     Marital status:      Spouse name: N/A     Number of children: 0     Years of education: 16     Occupational History     student      [employer]     Social History Main Topics     Smoking status: Never Smoker     Smokeless tobacco: Never Used      Comment: no 2nd hand smoke at home     Alcohol use 0.0 oz/week     0 Standard drinks or equivalent per week      Comment: once a month     Drug use: No     Sexual activity: Yes     Partners: Male     Birth control/ protection: Condom     Other Topics Concern      Service No     Blood Transfusions No     Caffeine Concern Yes     1 soda per day     Sleep Concern No     Stress Concern No     Weight Concern No     Special Diet No     Exercise Yes     Seat Belt Yes     Self-Exams No     once in a while     Parent/Sibling W/ Cabg, Mi Or Angioplasty Before 65f 55m? Yes     paternal grandmother, multiple bypass     Social History Narrative     2/10 , working fulltime RN at [employer], no kids, no pets       Family History   Problem Relation Age of Onset     Thyroid Disease Mother      Family History Negative Father      CANCER Maternal Grandmother      Ovarian Cancer in her late 50s     DIABETES Maternal Grandfather      kidney transplant 11/07-diabetes related     Other - See Comments Paternal Grandmother      PVD and uncle on paternal side      HEART DISEASE Paternal Grandfather      MI-at age of 60's     Thyroid Disease Sister      1-underactive in thyroid, not cancerous      Family History Negative Brother      1     Breast Cancer No family hx of       "CEREBROVASCULAR DISEASE No family hx of      Cancer - colorectal No family hx of          Review Of Systems  Skin: negative  Eyes: negative  Ears/Nose/Throat: negative  Respiratory: No shortness of breath, dyspnea on exertion, cough, or hemoptysis  Cardiovascular: negative  Gastrointestinal: negative  Genitourinary: negative  Musculoskeletal: back pain  Neurologic: negative  Psychiatric: negative  Hematologic/Lymphatic/Immunologic: negative  Endocrine: thyroid disorder     ROS: 10 point ROS neg other than the symptoms noted above in the HPI.    Vital Signs: /85 (BP Location: Right arm, Patient Position: Sitting, Cuff Size: Adult Large)  Pulse 82  Ht 5' 8.75\" (1.746 m)  Wt 230 lb (104.3 kg)  SpO2 96%  BMI 34.21 kg/m2    Examination:  Constitutional:  Alert, well nourished, NAD.  Memory: recent and remote memory intact   HEENT: Normocephalic, atraumatic.   Pulm:  Without shortness of breath   CV:  No pitting edema of BLE.    Neurological:  Awake  Alert  Oriented x 3  Speech clear  Cranial nerves II - XII intact  PERRL  EOMI  Face symmetric  Tongue midline  Motor exam   Shoulder Abduction:  Right:  5/5   Left:  5/5  Biceps:                      Right:  5/5   Left:  5/5  Triceps:                     Right:  5/5   Left:  5/5  Wrist Extensors:       Right:  5/5   Left:  5/5  Wrist Flexors:           Right:  5/5   Left:  5/5  Intrinsics:                   Right:  5/5   Left:  5/5   Hip Flexor:                Right: 5/5  Left:  5/5  Hip Adductor:             Right:  5/5  Left:  5/5  Hip Abductor:             Right:  5/5  Left:  5/5  Gastroc Soleus:        Right:  5/5  Left:  5/5  Tib/Ant:                      Right:  5/5  Left:  5/5  EHL:                          Right:  5/5  Left:  5/5   Sensation normal to bilateral upper and lower extremities  Muscle tone to bilateral upper and lower extremities normal  Gait: Able to stand from a seated position. Normal non-antalgic, non-myelopathic gait.  Able to heel/toe " walk without loss of balance    Lumbar examination reveals no tenderness of the spine or paraspinous muscles.  Hip height is symmetrical. Negative SI joint, sciatic notch or greater trochanteric tenderness to palpation bilaterally.  Straight leg raise is negative bilaterally.      Pt notes pain with palpation to the coccyx area only.      Imaging:     Coccyx xray    IMPRESSION: Alignment throughout the sacrococcygeal segments appears  normal. There is no evidence for fracture or dislocation.        Assessment/Plan:    Bella Scott is a 32 year old female with tailbone pain. She notes that this began about 1.5 years ago when she got pregnant.  Post delivery she still had the pain. She notes it is constant with sitting. It goes away with lying down.  She notes that it does not radiate and stays in the coccyx area. She states that it is very bothersome.  She denies any issues with her bowel or bladder.  She has not had PT or injections for her pain.  She currently works as a NICU nurse.  Pt is neurologically intact during exam. She only reports localized tailbone pain with palpation. At this time we discussed that she could try [facility] chiropractic care for acupuncture and muscle work.  If pain persists we would then recommend a caudal epidural injection. She is open to this.     Patient Instructions   1. Schedule with Dr. Cande Adkins at [facility]    2. Please contact the clinic if pain persists at [phone]. Then we will try injections and possible MRI.               Chelsea Hi CNP  [facility]  [address]  [address] [place] [address]    Tel [phone]  Pager [phone]      Again, thank you for allowing me to participate in the care of your patient.        Sincerely,        CECILIA Goncalves CNP

## 2018-02-22 NOTE — PROGRESS NOTES
"MEDICAL NUTRITION THERAPY  Visit Type:Initial assessment and intervention    SUBJECTIVE:   Bella Scott presents today for MNT and education related to weight management.   She is accompanied by self.     PATIENT STATED GOAL(S) FOR THIS VISIT: can't get motivated to lose weight, struggling with mental health/depression, large portions and evening snacking are challenges    EATING HABITS:   Breakfast: 1 cup honey bunches oats, 1% milk, 1 slice string cheese, coffee w/ creamer (4 oz coffee, 4 oz cream) OR 2-3 eggs, toast - butter, 8 oz juice  Lunch: sandwich - (wheat bread, salami, turkey, tomato, adkins, cheese), water/grape juice, Vaishnavi cake OR fish fillets, tator tots OR cheddar brat, bun, berries  Dinner: frozen pizza, caesar salad OR 6\" sub - chicken, cheese, veggies, adkins OR BLT sandwich (3 bread)  Snacks: granola bar, string cheese, dessert, ice cream, cereal  Beverages: coffee, juice, 1 soda per week (Coke or Coke Zero), milk    Misses meals? no  Eats out:  seldom     Previous diet education:  No     Food allergies/intolerances: no    EXERCISE: sporadic or irregular exercise - Beachbody workouts    SOCIO/ECONOMIC:   Lives with: spouse and children    OBJECTIVE:   Vitals: There were no vitals taken for this visit.    Weight Change: up 10 lbs since fall    ASSESSMENT:   Diet is high in calories and fat, low in fiber, fruits and vegetables.  Depression and fatigue are making it difficult to start anything (diet, exercise).  Desires to feel better this year.  Wondering about different fad diets - carb cycling, keto, etc.  Kids tend to be picky at meals, which can interfere with meal planning and healthy choices.  Likes to eat large portions.  Diet is high in: calories and carbs  Diet is low in: fiber, fruits and vegetables    Recommended Energy Intake: 1550 kcal for loss  VERBAL AND WRITTEN INFORMATION GIVEN TO SUPPORT:  Discussed: general nutrition guidelines, weight reduction, consistent meals, carb counting, " artificial sweeteners, fat modification, exercise, fiber, behavior modification and portion control.  Education Materials Provided: My Plate Planner/Choose My Plate, 100 Calorie Snacks, Weight Loss Tips and Carbohydrate Counting    PLAN:   PATIENT'S BEHAVIOR CHANGE GOALS:   See Patient Instructions for patient stated behavior change goals. AVS was printed and given to patient at today's appointment.    FOLLOW UP:   Follow up with RD as needed.  Call RD with questions/concerns.     Bella Nagel RD LD CDE    Time spent in minutes: 70  Encounter: Individual

## 2018-03-05 ENCOUNTER — OFFICE VISIT (OUTPATIENT)
Dept: FAMILY MEDICINE | Facility: CLINIC | Age: 33
End: 2018-03-05
Payer: COMMERCIAL

## 2018-03-05 VITALS
WEIGHT: 231.2 LBS | DIASTOLIC BLOOD PRESSURE: 76 MMHG | BODY MASS INDEX: 34.39 KG/M2 | SYSTOLIC BLOOD PRESSURE: 120 MMHG | OXYGEN SATURATION: 98 % | TEMPERATURE: 97.8 F | HEART RATE: 84 BPM | RESPIRATION RATE: 16 BRPM

## 2018-03-05 DIAGNOSIS — E66.09 CLASS 1 OBESITY DUE TO EXCESS CALORIES WITHOUT SERIOUS COMORBIDITY WITH BODY MASS INDEX (BMI) OF 34.0 TO 34.9 IN ADULT: ICD-10-CM

## 2018-03-05 DIAGNOSIS — H10.32 ACUTE CONJUNCTIVITIS OF LEFT EYE, UNSPECIFIED ACUTE CONJUNCTIVITIS TYPE: Primary | ICD-10-CM

## 2018-03-05 DIAGNOSIS — E66.811 CLASS 1 OBESITY DUE TO EXCESS CALORIES WITHOUT SERIOUS COMORBIDITY WITH BODY MASS INDEX (BMI) OF 34.0 TO 34.9 IN ADULT: ICD-10-CM

## 2018-03-05 PROCEDURE — 99214 OFFICE O/P EST MOD 30 MIN: CPT | Performed by: PHYSICIAN ASSISTANT

## 2018-03-05 RX ORDER — POLYMYXIN B SULFATE AND TRIMETHOPRIM 1; 10000 MG/ML; [USP'U]/ML
1 SOLUTION OPHTHALMIC 4 TIMES DAILY
Qty: 10 ML | Refills: 0 | Status: SHIPPED | OUTPATIENT
Start: 2018-03-05 | End: 2018-03-12

## 2018-03-05 RX ORDER — PHENTERMINE HYDROCHLORIDE 15 MG/1
15 CAPSULE ORAL EVERY MORNING
Qty: 30 CAPSULE | Refills: 0 | Status: SHIPPED | OUTPATIENT
Start: 2018-03-05 | End: 2018-04-16

## 2018-03-05 ASSESSMENT — PAIN SCALES - GENERAL: PAINLEVEL: NO PAIN (1)

## 2018-03-05 NOTE — PROGRESS NOTES
HPI   SUBJECTIVE:   Bella Scott is a 32 year old female who presents to clinic today for the following health issues:    Eye(s) Problem  Onset: 3 days    Description:   Location: left  Pain: no, feels like there is sand in her eye(feels very irritated.    Redness: YES- left eye     Accompanying Signs & Symptoms:  Discharge/mattering: YES  Swelling: no  Visual changes: YES- blurry  Fever: no  Nasal Congestion: YES- for the past 1.5 weeks  Bothered by bright lights: YES    History:   Trauma: no   Foreign body exposure: no    Precipitating factors:   Wearing contacts: no    Alleviating factors:  Improved by: NOTHING     Therapies Tried and outcome: NOTHING     3 days ago noticed redness in her eye and today was crusted shut.  No pain or light sensitivity.  Both kids in the last month have had pink eye that needed treatment.  She has had minor cold symptoms for the past 1.5 weeks.  Mostly just feels irritated.      WEIGHT PROBLEM:      Medication Followup of naltrexone-bupropion (CONTRAVE) 8-90 MG per 12 hr tablet    Taking Medication as prescribed: NO-stopped due to side effects    Side Effects:  Dizzy and Constant headache     Medication Helping Symptoms:  yes     Bella is here also to follow up on weight loss and starting medication for this.  She was put on Contrave last month for this.  She did not end up tolerating this medication very well.  She was dizzy while taking it and had a headache.  She never made it past the step of taking it twice daily.  The side effects were just too much for her to tolerate.  She is wondering today about trying phentermine.  She knows someone on this medication for whom it works very well.      Problem list and histories reviewed & adjusted, as indicated.  Additional history: as documented    BP Readings from Last 3 Encounters:   03/05/18 120/76   02/22/18 115/85   02/06/18 116/72    Wt Readings from Last 3 Encounters:   03/05/18 231 lb 3.2 oz (104.9 kg)   02/22/18 230 lb (104.3  kg)   02/06/18 230 lb 9.6 oz (104.6 kg)           Labs reviewed in EPIC    Reviewed and updated as needed this visit by clinical staff  Tobacco  Allergies  Meds  Problems  Med Hx  Surg Hx  Fam Hx  Soc Hx        Reviewed and updated as needed this visit by Provider         ROS:  Constitutional, HEENT, cardiovascular, pulmonary, gi and gu systems are negative, except as otherwise noted.    OBJECTIVE:     /76 (BP Location: Right arm, Patient Position: Chair, Cuff Size: Adult Large)  Pulse 84  Temp 97.8  F (36.6  C) (Oral)  Resp 16  Wt 231 lb 3.2 oz (104.9 kg)  SpO2 98%  BMI 34.39 kg/m2  Body mass index is 34.39 kg/(m^2).  GENERAL: healthy, alert and no distress  EYES: Eyes grossly normal to inspection and conjunctiva/corneas- mild injection to the left eye, no discharge noted.  HENT: ear canals and TM's normal, nose and mouth without ulcers or lesions  CV: regular rate and rhythm, normal S1 S2, no S3 or S4, no murmur, click or rub, no peripheral edema and peripheral pulses strong  MS: no gross musculoskeletal defects noted, no edema  SKIN: no suspicious lesions or rashes  PSYCH: mentation appears normal and affect flat    Diagnostic Test Results:  none     ASSESSMENT/PLAN:   1. Acute conjunctivitis of left eye, unspecified acute conjunctivitis type  Discussed this is likely viral but since it is going around her household I did give Rx to hold on to if needed.  - trimethoprim-polymyxin b (POLYTRIM) ophthalmic solution; Place 1 drop Into the left eye 4 times daily for 7 days  Dispense: 10 mL; Refill: 0    2. Class 1 obesity due to excess calories without serious comorbidity with body mass index (BMI) of 34.0 to 34.9 in adult  Starting dose to use for 2 weeks at 15mg.  Then she can increase this to 30mg.  Follow up in about 1 months for med check.  - phentermine 15 MG capsule; Take 1 capsule (15 mg) by mouth every morning  Dispense: 30 capsule; Refill: 0  - Lipid panel reflex to direct LDL Fasting;  GRANT FinleyC  [facility]      Physical Exam

## 2018-03-05 NOTE — MR AVS SNAPSHOT
After Visit Summary   3/5/2018    Bella Scott    MRN: [id number]           Patient Information     Date Of Birth          1985        Visit Information        Provider Department      3/5/2018 3:00 PM Osmar Rendon PA-C [facility]        Today's Diagnoses     Acute conjunctivitis of left eye, unspecified acute conjunctivitis type    -  1    Class 1 obesity due to excess calories without serious comorbidity with body mass index (BMI) of 34.0 to 34.9 in adult           Follow-ups after your visit        Follow-up notes from your care team     Return in about 1 month (around 4/5/2018) for Med Check.      Your next 10 appointments already scheduled     Mar 20, 2018  3:30 PM CDT   Return Visit with Jeanne Monroy LP   [facility]    [address] [place] [id number] [phone]            Mar 27, 2018 11:15 AM CDT   (Arrive by 11:00 AM)   [facility]ropractor with KADY [facility] ([facility] [place]  )    [address] [facility]  [address] [place] [id number] [phone]              Who to contact     If you have questions or need follow up information about today's clinic visit or your schedule please contact [facility] directly at [phone].  Normal or non-critical lab and imaging results will be communicated to you by MyChart, letter or phone within 4 business days after the clinic has received the results. If you do not hear from us within 7 days, please contact the clinic through MyChart or phone. If you have a critical or abnormal lab result, we will notify you by phone as soon as possible.  Submit refill requests through [employer] or call your pharmacy and they will forward the refill request to us. Please allow 3 business days for your refill to be completed.          Additional Information About Your Visit        MyChart Information     [employer] gives you  secure access to your electronic health record. If you see a primary care provider, you can also send messages to your care team and make appointments. If you have questions, please call your primary care clinic.  If you do not have a primary care provider, please call [phone] and they will assist you.        Care EveryWhere ID     This is your Care EveryWhere ID. This could be used by other organizations to access your [facility] medical records  [id number]        Your Vitals Were     Pulse Temperature Respirations Pulse Oximetry BMI (Body Mass Index)       84 97.8  F (36.6  C) (Oral) 16 98% 34.39 kg/m2        Blood Pressure from Last 3 Encounters:   03/05/18 120/76   02/22/18 115/85   02/06/18 116/72    Weight from Last 3 Encounters:   03/05/18 231 lb 3.2 oz (104.9 kg)   02/22/18 230 lb (104.3 kg)   02/06/18 230 lb 9.6 oz (104.6 kg)              Today, you had the following     No orders found for display         Today's Medication Changes          These changes are accurate as of 3/5/18  3:36 PM.  If you have any questions, ask your nurse or doctor.               Start taking these medicines.        Dose/Directions    phentermine 15 MG capsule   Used for:  Class 1 obesity due to excess calories without serious comorbidity with body mass index (BMI) of 34.0 to 34.9 in adult   Started by:  Osmar Rendon PA-C        Dose:  15 mg   Take 1 capsule (15 mg) by mouth every morning   Quantity:  30 capsule   Refills:  0       trimethoprim-polymyxin b ophthalmic solution   Commonly known as:  POLYTRIM   Used for:  Acute conjunctivitis of left eye, unspecified acute conjunctivitis type   Started by:  Osmar Rendon PA-C        Dose:  1 drop   Place 1 drop Into the left eye 4 times daily for 7 days   Quantity:  10 mL   Refills:  0            Where to get your medicines      These medications were sent to [id number] [facility] - [place], [place] - [address]  [address] [facility]  [address]     Phone:  [phone]     trimethoprim-polymyxin b ophthalmic solution         Some of these will need a paper prescription and others can be bought over the counter.  Ask your nurse if you have questions.     Bring a paper prescription for each of these medications     phentermine 15 MG capsule                Primary Care Provider Office Phone # Fax Mathew Rendon PA-C [phone]       [id number]  [facility] [address]        Equal Access to Services     MISBAH DUARTE : Hadii aad ku hadasho Soomaali, waaxda luqadaha, qaybta kaalmada adeegyada, waxay idiin hayaan adeeg kharash la'pierre . So [facility] [phone].    ATENCIÓN: Si habla español, tiene a guallpa disposición servicios gratuitos de asistencia lingüística. [facility] [phone].    We comply with applicable federal civil rights laws and [place] laws. We do not discriminate on the basis of race, color, national origin, age, disability, sex, sexual orientation, or gender identity.            Thank you!     Thank you for choosing [facility]  for your care. Our goal is always to provide you with excellent care. Hearing back from our patients is one way we can continue to improve our services. Please take a few minutes to complete the written survey that you may receive in the mail after your visit with us. Thank you!             Your Updated Medication List - Protect others around you: Learn how to safely use, store and throw away your medicines at www.disposemymeds.org.          This list is accurate as of 3/5/18  3:36 PM.  Always use your most recent med list.                   Brand Name Dispense Instructions for use Diagnosis    levothyroxine 112 MCG tablet    SYNTHROID/LEVOTHROID    90 tablet    TAKE 1 TABLET (112 MCG) BY MOUTH DAILY    Hypothyroidism due to acquired atrophy of thyroid       phentermine 15 MG capsule     30 capsule    Take 1 capsule (15 mg) by mouth every morning    Class 1 obesity due  to excess calories without serious comorbidity with body mass index (BMI) of 34.0 to 34.9 in adult       PRENATABS FA Tabs      Take  by mouth.        trimethoprim-polymyxin b ophthalmic solution    POLYTRIM    10 mL    Place 1 drop Into the left eye 4 times daily for 7 days    Acute conjunctivitis of left eye, unspecified acute conjunctivitis type       VITAMIN D (CHOLECALCIFEROL) PO      Take 2,000 Units by mouth daily

## 2018-03-05 NOTE — NURSING NOTE
"Chief Complaint   Patient presents with     Recheck Medication     naltrexone-bupropion (CONTRAVE) 8-90 MG per 12 hr tablet      Eye Problem     Initial /76 (BP Location: Right arm, Patient Position: Chair, Cuff Size: Adult Large)  Pulse 84  Temp 97.8  F (36.6  C) (Oral)  Resp 16  Wt 231 lb 3.2 oz (104.9 kg)  SpO2 98%  BMI 34.39 kg/m2 Estimated body mass index is 34.39 kg/(m^2) as calculated from the following:    Height as of 2/22/18: 5' 8.75\" (1.746 m).    Weight as of this encounter: 231 lb 3.2 oz (104.9 kg).  BP completed using cuff size large right arm.    Lisa Magill, CMA    "

## 2018-03-07 ENCOUNTER — TELEPHONE (OUTPATIENT)
Dept: FAMILY MEDICINE | Facility: CLINIC | Age: 33
End: 2018-03-07

## 2018-03-07 NOTE — TELEPHONE ENCOUNTER
Received prior auth APPROVAL for PHENTERMINE 15MG effective 3/6/2018 to 9/2/2018.  Case ID:  [id number].    Jacque Montez RN

## 2018-03-08 NOTE — TELEPHONE ENCOUNTER
There was a note now from yesterday about cutting the 37.5mg in half.  Can we call to see what she wants to do.  I've never had a patient cut these in half before.  We could see if the PA covers the 15mg for her.      Osmar

## 2018-03-20 ENCOUNTER — OFFICE VISIT (OUTPATIENT)
Dept: PSYCHOLOGY | Facility: CLINIC | Age: 33
End: 2018-03-20
Payer: COMMERCIAL

## 2018-03-20 DIAGNOSIS — F32.0 MILD MAJOR DEPRESSION (H): Primary | ICD-10-CM

## 2018-03-20 PROCEDURE — 90834 PSYTX W PT 45 MINUTES: CPT | Performed by: PSYCHOLOGIST

## 2018-03-20 NOTE — PROGRESS NOTES
"                                             Progress Note    Client Name: Bella Scott  Date: 3-20 -18         Service Type: Individual      Session Start Time: 3:30  Session End Time: 4:20      Session Length: 38-52 min     Session #: 3     Attendees: Client    Treatment Plan Last Reviewed: today  PHQ-9 / DESIREE-7 : see flow sheets.     DATA      Progress Since Last Session (Related to Symptoms / Goals / Homework):   Symptoms: Stable but with feeling anxious , irritable, low energy, low mood. Feeling bad about self.    Homework: Completed in session      Episode of Care Goals: Satisfactory progress - ACTION (Actively working towards change); Intervened by reinforcing change plan / affirming steps taken     Current / Ongoing Stressors and Concerns:   Worry.    Weight issue     Treatment Objective(s) Addressed in This Session:   use at least 6 coping skills for anxiety management in the next 16 weeks       Intervention:  Client reports feeling a bit better in last few weeks.       Explored lack of follow through on her health goals.  Feels poor focus. Does look for healthy recipes.  Called self a \"food addict\" for years.  Feels she has no integrity.  Go to behavior is baking on a stressful day. \"I have no good hobbies\".  Not so much about the social contract for womens bodies, or her own body shaping.   Explored barriers- visual, energy robbers, shoulds about not throwing away / wasting  foord old cooking crump sizes       ASSESSMENT: Current Emotional / Mental Status (status of significant symptoms):   Risk status (Self / Other harm or suicidal ideation)   Client denies current fears or concerns for personal safety.   Client denies current or recent suicidal ideation or behaviors.   Client denies current or recent homicidal ideation or behaviors.   Client denies current or recent self injurious behavior or ideation.   Client denies other safety concerns.   A safety and risk management plan has not been developed at " this time, however client was given the after-hours number / 911 should there be a change in any of these risk factors.     Appearance:   Appropriate    Eye Contact:   Good    Psychomotor Behavior: Normal    Attitude:   Cooperative    Orientation:   All   Speech    Rate / Production: Normal     Volume:  Normal    Mood:    Anxious  Normal   Affect:    Appropriate    Thought Content:  Clear    Thought Form:  Coherent  Logical    Insight:    Good      Medication Review:   No changes to current psychiatric medication(s)     Medication Compliance:   Yes     Changes in Health Issues:   None reported     Chemical Use Review:   Substance Use: Chemical use reviewed, no active concerns identified      Tobacco Use: No current tobacco use.       Collateral Reports Completed:   Routed note to PCP as needed.    PLAN: (Client Tasks / Therapist Tasks / Other)  Review session notes on barriers and integrity challenges.  Formulate 2-3 action steps    Past hw    Start the breath to calm system b4 eating.  Raise heart rate for 5 min    Jeanne Monroy LP                                                         ________________________________________________________________________    Treatment Plan    Client's Name: Bella Scott  YOB: 1985    Date: 2-13-18    DSM-V Diagnoses: MDD    Psychosocial / Contextual Factors: multiple stressors  WHODAS: see dx    Referral / Collaboration:  Referral to another professional/service is not indicated at this time.    Anticipated number of session or this episode of care: 12      MeasurableTreatment Goal(s) related to diagnosis / functional impairment(s)  Goal 1: Client will understand core triggers and learn to use 3-6 skills consistantly to mediate anxiety and stress    I will know I've met my goal when .  50% less anxiety    Objective #A (Client Action)    Client will use at least 3-6 coping skills for anxiety management in the next 16 weeks.  Status: New - Date: 2-13-18     Goal  2: Client will create a realistic small daily steps in her  plan to address weight.    I will know I've met my goal when .  has 3 consistent habits    Objective #A (Client Action)    Client will use at least 3-6 coping skills for anxiety management in the next 16 weeks.  Status: New - Date: 2-13-18       Client has reviewed and agreed to the above plan.      Jeanne Monroy LP  February 13, 2018

## 2018-03-20 NOTE — MR AVS SNAPSHOT
MRN:[id number]                      After Visit Summary   3/20/2018    Bella Scott    MRN: [id number]           Visit Information        Provider Department      3/20/2018 3:30 PM Jeanne Monroy LP [facility] Generic      Your next 10 appointments already scheduled     Mar 27, 2018 11:15 AM CDT   (Arrive by 11:00 AM)   [facility] Chiropractor with KADY Chicas [place] [facility]O ([facility] [place]  )    [address]  [address]  [place] [address] [phone]            Apr 16, 2018  9:30 AM CDT   Return Visit with Jeanne Monroy LP   [facility]    [address] [address] [phone]            May 08, 2018  4:30 PM CDT   Return Visit with Jeanne Monroy LP   [facility]    [address] [address] [phone]              MyChart Information     [employer] gives you secure access to your electronic health record. If you see a primary care provider, you can also send messages to your care team and make appointments. If you have questions, please call your primary care clinic.  If you do not have a primary care provider, please call [phone] and they will assist you.        Care EveryWhere ID     This is your Care EveryWhere ID. This could be used by other organizations to access your [facility] medical records  [id number]        Equal Access to Services     MISBAH DUARTE : Hadii aad ku hadasho Soomaali, waaxda luqadaha, qaybta kaalmada adeegyada, waxnicolás crumptriny de leon. So [facility] [phone].    ATENCIÓN: Si habla español, tiene a guallpa disposición servicios gratuitos de asistencia lingüística. Llame al [phone].    We comply with applicable federal civil rights laws and [place] laws. We do not discriminate on the basis of race, color, national origin, age, disability, sex, sexual  orientation, or gender identity.

## 2018-03-27 ENCOUNTER — THERAPY VISIT (OUTPATIENT)
Dept: CHIROPRACTIC MEDICINE | Facility: CLINIC | Age: 33
End: 2018-03-27
Payer: COMMERCIAL

## 2018-03-27 DIAGNOSIS — M54.50 CHRONIC RIGHT-SIDED LOW BACK PAIN WITHOUT SCIATICA: ICD-10-CM

## 2018-03-27 DIAGNOSIS — M40.00 ACQUIRED POSTURAL KYPHOSIS: ICD-10-CM

## 2018-03-27 DIAGNOSIS — G89.29 CHRONIC RIGHT-SIDED LOW BACK PAIN WITHOUT SCIATICA: ICD-10-CM

## 2018-03-27 DIAGNOSIS — M99.04 SOMATIC DYSFUNCTION OF SACRAL REGION: ICD-10-CM

## 2018-03-27 DIAGNOSIS — M99.02 THORACIC SEGMENT DYSFUNCTION: ICD-10-CM

## 2018-03-27 DIAGNOSIS — M53.3 PAIN IN THE COCCYX: Primary | ICD-10-CM

## 2018-03-27 DIAGNOSIS — R29.3 POOR POSTURE: ICD-10-CM

## 2018-03-27 PROCEDURE — 98941 CHIROPRACT MANJ 3-4 REGIONS: CPT | Mod: AT | Performed by: CHIROPRACTOR

## 2018-03-27 PROCEDURE — 99203 OFFICE O/P NEW LOW 30 MIN: CPT | Mod: 25 | Performed by: CHIROPRACTOR

## 2018-03-27 PROCEDURE — 97112 NEUROMUSCULAR REEDUCATION: CPT | Mod: 59 | Performed by: CHIROPRACTOR

## 2018-03-28 PROBLEM — M53.3 PAIN IN THE COCCYX: Status: ACTIVE | Noted: 2018-03-28

## 2018-03-28 NOTE — PROGRESS NOTES
Initial Chiropractic Clinic Visit    PCP: Osmar Rendon    Bella Scott is a 32 year old female who is seen  in consultation at the request of  Chelsea Hi C.N.P. presenting with chronic low back pain and tailbone pain. Patient reports that the onset was over 1.5 years ago.  When asked, patient denies:, falling, slipping, bending and reaching or sleeping awkwardly. The pt reports low back and tailbone pain that seemed to start during pregnancy several years ago. The pain seemed to worsen. Prior to the onset, and before pregnancy the pt reported chronic R sided low back pain. In addition, the pt has gained weight since her pregnancy. The pt is unable to sit on the couch or any hard chair for more than 15 minutes She grades the px a 3-9/10 on VAS. Prolonged sitting and standing will increase the px.  Prior to onset, the patient was able to perform transitional movements with no issue. Sit for one hour. Patient notes that due to symptoms, they can only sit for under 15 minutes. Ailyn Castillo notes   sitting rated at a 9/10 painful, difficult and prior to this incident it was 0/10.      Injury: There was no injury related to this episode     Location of Pain: right low back pain and central tailbone pain at the following level(s) T7 , T10, L5 , Sacrum  and PSIS Right   Duration of Pain: over 1.5 years ago.    Rating of Pain at worst: 9/10  Rating of Pain Currently: 3/10  Symptoms are better with: Nothing  Symptoms are worse with: sitting, standing and transitional movements  Additional Features: none      Health History  as reported by the patient:    How does the patient rate their own health:   Good    Current or past medical history:   Overweight and Thyroid problems    Medical allergies  None    Past Traumas/Surgeries  None    Family History  Family History   Problem Relation Age of Onset     Thyroid Disease Mother      Family History Negative Father      CANCER Maternal Grandmother      Ovarian  Cancer in her late 50s     DIABETES Maternal Grandfather      kidney transplant -diabetes related     Other - See Comments Paternal Grandmother      PVD and uncle on paternal side      HEART DISEASE Paternal Grandfather      MI-at age of 60's     Thyroid Disease Sister      1-underactive in thyroid, not cancerous      Family History Negative Brother      1     Breast Cancer No family hx of      CEREBROVASCULAR DISEASE No family hx of      Cancer - colorectal No family hx of        Medications:  Thyroid    Occupation:  RN    Primary job tasks:   Computer work, Lifting/carrying and Prolonged standing    Barriers as home/work:   none    Additional health Issues:     None             Bella was asked to complete the Oswestry Low Back Disability Index and Rojas Start Back screening tool, today in the office.  Disability score: 18%. Keel Start Total Score:3 Sub Score: 2     Review of Systems  Musculoskeletal: as above  Remainder of review of systems is negative including constitutional, CV, pulmonary, GI, Skin and Neurologic except as noted in HPI or medical history.    Past Medical History:   Diagnosis Date     Anemia, unspecified 3/24/2003    iron deficiney per pt     Contraception      Unspecified hypothyroidism      Past Surgical History:   Procedure Laterality Date      SECTION  2014    Procedure:  SECTION;;  Surgeon: Lisa Steele MD;  Location:  L+D      SECTION N/A 3/14/2017    Procedure:  SECTION;  Surgeon: Lucie Jackson MD;  Location:  L+D     HC EXC BENIGN SKIN LESION SCLP/NCK/HNDS/FEET/GEN 0.6-1.0 CM  08    RT Thumb     HC REPAIR OF NAIL BED  08    RT Thumb-benign     Objective  There were no vitals taken for this visit.      GENERAL APPEARANCE: healthy, alert and no distress   GAIT: NORMAL  SKIN: no suspicious lesions or rashes  NEURO: Normal strength and tone, mentation intact and speech normal  PSYCH:  mentation appears normal and affect  "normal/bright    Low back exam:    Inspection:  \"     no visible deformity in the low back       normal skin\"poor seated posture, slumped, anterior head carriage, increased kyphosis    ROM:       full flexion       limited extension due to pain    Tender:       paraspinal muscles       B QL, R AOB    Non Tender:       remainder of lumbar spine    Strength:       hip flexion 5/5 Normal       knee extension 5/5 Normal       ankle dorsiflexion 5/5 Normal       ankle plantarflexion 5/5 Normal       dorsiflexion of the great toe 5/5 Normal    Reflexes:       patellar (L3, L4) 2 bilaterally       achilles tendons (S1) 2 bilaterally    Sensation:      grossly intact throughout lower extremities    Special tests:  Kemps - Right positive, low back pain and Left positive, low back pain, SLR - Right positive, low back pain and Left negative, Gaenslen's - Right negative and Left negative and Fabere - Right positive, hip pain and Left negative    Segmental spinal dysfunction/restrictions found at:T7 , T10, L5 , Sacrum  and PSIS Right     The following soft tissue hypotonicities were observed:Quad lumb: right, referred pain: no  T paraspinals: ache and dull pain, no    Trigger points were found in:none     Muscle spasm found in:Gluteal, Lumbar erector spine, Quad lumb and T-spine paraspinal      Radiology:  None     Assessment:    1. Pain in the coccyx    2. Chronic right-sided low back pain without sciatica    3. Somatic dysfunction of sacral region    4. Thoracic segment dysfunction    5. Poor posture    6. Acquired postural kyphosis        RX ordered/plan of care  Anticipated outcomes  Possible risks and side effects    After discussing the risk and benefits of care, patient consented to treatment    Prognosis: Good      Patient's condition:  Patient had restrictions pre-manipulation    Treatment effectiveness:  Post manipulation there is better intersegmental movement and Patient claims to feel looser post " manipulation      Plan:    Procedures:  Evaluation and Management:  18438 Moderate level exam 30 min    CMT:  11242 Chiropractic manipulative treatment 3-4 regions performed   Thoracic: Diversified, T5, T9, Prone  Lumbar: Diversified, L5, Side posture  Pelvis: Drop Table, Sacrum , PSIS Right , Prone    Modalities:  None performed this visit    Therapeutic procedures:  36248: Neurological re-education/proprioception training and proper long term sitting posture: Corrected patient's seated posture when sitting for longer than 20 minutes or seated at the computer related to work duties for over 8 hours per day. Fit patient with Soledad lumbar support for postural re-training. Showed patient how to place the support correctly when seated and to increase usage by 2-3 hour increments per day until they are able to sit full time without spinal irritation with demonstration. Related improper vs. proper sitting to spinal biomechanics using the spine model with demonstration with the purpose of PREVENTING premature spinal degeneration from cumulative static motion. Demonstrated the increase in load and shearing forces on the spine in addition to the cumulative degenerative effects of axial compression on a spine that is chronically slumped and in an 'unlocked' position vs a 'locked' position. Gave cervical retraction for proper cervical alignment, anterior deep cervical flexor strengthening and proprioception training with demonstration. Per 10 minutes total        Response to Treatment  Reduction in symptoms as reported by patient      Treatment plan and goals:  Goals:  SITTING: the patient specific goal is to attain pre-injury status of 2- 3 hours comfortably  Drive 3-7 hours     Frequency of care  Duration of care is estimated to be 6-8 weeks, from the initial treatment.  It is estimated that the patient will need a total of 6-8 visits to resolve this episode.  For the initial therapeutic trial of care, the frequency is  recommended at 6 X week, once daily.  A reevaluation would be clinically appropriate in 6 visits, to determine progress and further course of care.    In-Office Treatment  Evaluation  Spinal Chiropractic Manipulative Therapy  Postural correction  ACP discussion        Recommendations:    Instructions:use lumbar support when seated for prolonged periods     Follow-up:    Return to care in 1 week with ACP or US therapy.       Discussed the assessment with the patient.      Disclaimer: This note consists of symbols derived from keyboarding, dictation and/or voice recognition software. As a result, there may be errors in the script that have gone undetected. Please consider this when interpreting information found in this chart.

## 2018-04-16 ENCOUNTER — OFFICE VISIT (OUTPATIENT)
Dept: PSYCHOLOGY | Facility: CLINIC | Age: 33
End: 2018-04-16
Payer: COMMERCIAL

## 2018-04-16 DIAGNOSIS — F32.0 MILD MAJOR DEPRESSION (H): Primary | ICD-10-CM

## 2018-04-16 DIAGNOSIS — E66.811 CLASS 1 OBESITY DUE TO EXCESS CALORIES WITHOUT SERIOUS COMORBIDITY WITH BODY MASS INDEX (BMI) OF 34.0 TO 34.9 IN ADULT: ICD-10-CM

## 2018-04-16 DIAGNOSIS — E66.09 CLASS 1 OBESITY DUE TO EXCESS CALORIES WITHOUT SERIOUS COMORBIDITY WITH BODY MASS INDEX (BMI) OF 34.0 TO 34.9 IN ADULT: ICD-10-CM

## 2018-04-16 LAB
CHOLEST SERPL-MCNC: 230 MG/DL
HDLC SERPL-MCNC: 30 MG/DL
LDLC SERPL CALC-MCNC: 158 MG/DL
NONHDLC SERPL-MCNC: 200 MG/DL
TRIGL SERPL-MCNC: 211 MG/DL

## 2018-04-16 PROCEDURE — 80061 LIPID PANEL: CPT | Performed by: PHYSICIAN ASSISTANT

## 2018-04-16 PROCEDURE — 36415 COLL VENOUS BLD VENIPUNCTURE: CPT | Performed by: PHYSICIAN ASSISTANT

## 2018-04-16 PROCEDURE — 90834 PSYTX W PT 45 MINUTES: CPT | Performed by: PSYCHOLOGIST

## 2018-04-16 RX ORDER — PHENTERMINE HYDROCHLORIDE 15 MG/1
15 CAPSULE ORAL EVERY MORNING
Qty: 30 CAPSULE | Refills: 0 | Status: SHIPPED | OUTPATIENT
Start: 2018-04-16 | End: 2018-05-24 | Stop reason: DRUGHIGH

## 2018-04-16 ASSESSMENT — ANXIETY QUESTIONNAIRES
7. FEELING AFRAID AS IF SOMETHING AWFUL MIGHT HAPPEN: SEVERAL DAYS
GAD7 TOTAL SCORE: 3
6. BECOMING EASILY ANNOYED OR IRRITABLE: SEVERAL DAYS
5. BEING SO RESTLESS THAT IT IS HARD TO SIT STILL: NOT AT ALL
1. FEELING NERVOUS, ANXIOUS, OR ON EDGE: SEVERAL DAYS
2. NOT BEING ABLE TO STOP OR CONTROL WORRYING: NOT AT ALL
3. WORRYING TOO MUCH ABOUT DIFFERENT THINGS: NOT AT ALL

## 2018-04-16 ASSESSMENT — PATIENT HEALTH QUESTIONNAIRE - PHQ9: 5. POOR APPETITE OR OVEREATING: NOT AT ALL

## 2018-04-16 NOTE — MR AVS SNAPSHOT
After Visit Summary   4/16/2018    Bella Scott    MRN: [id number]           Patient Information     Date Of Birth          1985        Visit Information        Provider Department      4/16/2018 9:30 AM Jeanne Monroy LP [facility] Generic      Today's Diagnoses     Mild major depression (H)    -  1       Follow-ups after your visit        Your next 10 appointments already scheduled     Apr 17, 2018  3:30 PM CDT   Chiro Acupuncture Follow Up with KADY Chicas [place] CHIRO ([facility]  )    [address]  [address] [place] [address]   [phone]            May 01, 2018  4:00 PM CDT   Chiro Acupuncture Follow Up with KADY Chicas [place] CHIRO ([facility]  )    [address] [address] [address] [phone]            May 08, 2018  4:30 PM CDT   Return Visit with Jeanne Monroy LP   [facility] ([address] [address] [phone]            May 22, 2018  2:30 PM CDT   Return Visit with Jeanne Monroy LP   [facility] ([address] [address] [phone]              Who to contact     If you have questions or need follow up information about today's clinic visit or your schedule please contact [facility] directly at [phone].  Normal or non-critical lab and imaging results will be communicated to you by MyChart, letter or phone within 4 business days after the clinic has received the results. If you do not hear from us within 7 days, please contact the clinic through [employer] or phone. If you have a critical or abnormal lab result, we will notify you by phone as soon as possible.  Submit refill requests through [employer] or call your pharmacy and they will forward the refill request to us. Please  allow 3 business days for your refill to be completed.          Additional Information About Your Visit        MyChart Information     [employer] gives you secure access to your electronic health record. If you see a primary care provider, you can also send messages to your care team and make appointments. If you have questions, please call your primary care clinic.  If you do not have a primary care provider, please call [phone] and they will assist you.        Care EveryWhere ID     This is your Care EveryWhere ID. This could be used by other organizations to access your [facility] medical records  [id number]         Blood Pressure from Last 3 Encounters:   03/05/18 120/76   02/22/18 115/85   02/06/18 116/72    Weight from Last 3 Encounters:   03/05/18 104.9 kg (231 lb 3.2 oz)   02/22/18 104.3 kg (230 lb)   02/06/18 104.6 kg (230 lb 9.6 oz)              Today, you had the following     No orders found for display         Where to get your medicines      Some of these will need a paper prescription and others can be bought over the counter.  Ask your nurse if you have questions.     Bring a paper prescription for each of these medications     phentermine 15 MG capsule          Primary Care Provider Office Phone # Fax #    Osmar Cindy Rendon PA-C [phone]       [address] [address] [address]        Equal Access to Services     [facility] GERALD : Hadii mike shoemaker hadasho Soantoinetteali, waaxda luqadaha, qaybta kaalmada callie, kaila de leon. So [facility] [phone].    ATENCIÓN: Si habla español, tiene a guallpa disposición servicios gratuitos de asistencia lingüística. Llame al [phone].    We comply with applicable federal civil rights laws and [place] laws. We do not discriminate on the basis of race, color, national origin, age, disability, sex, sexual orientation, or gender identity.            Thank you!     Thank you for choosing [facility]  [facility]  for your care. Our goal is always to provide you with excellent care. Hearing back from our patients is one way we can continue to improve our services. Please take a few minutes to complete the written survey that you may receive in the mail after your visit with us. Thank you!             Your Updated Medication List - Protect others around you: Learn how to safely use, store and throw away your medicines at www.disposemymeds.org.          This list is accurate as of 4/16/18  4:40 PM.  Always use your most recent med list.                   Brand Name Dispense Instructions for use Diagnosis    levothyroxine 112 MCG tablet    SYNTHROID/LEVOTHROID    90 tablet    TAKE 1 TABLET (112 MCG) BY MOUTH DAILY    Hypothyroidism due to acquired atrophy of thyroid       phentermine 15 MG capsule     30 capsule    Take 1 capsule (15 mg) by mouth every morning    Class 1 obesity due to excess calories without serious comorbidity with body mass index (BMI) of 34.0 to 34.9 in adult       PRENATABS FA Tabs      Take  by mouth.        VITAMIN D (CHOLECALCIFEROL) PO      Take 2,000 Units by mouth daily

## 2018-04-16 NOTE — PROGRESS NOTES
"                                             Progress Note    Client Name: Bella Soctt  Date:          Service Type: Individual      Session Start Time: 9:40  Session End Time: 10:30      Session Length: 38-52 min     Session #: 4     Attendees: Client    Treatment Plan Last Reviewed: today  PHQ-9 / DESIREE-7 : see flow sheets.     DATA      Progress Since Last Session (Related to Symptoms / Goals / Homework):   Symptoms: Stable but with feeling anxious , irritable, low energy, low mood. Feeling bad about self.    Homework: Completed in session      Episode of Care Goals: Satisfactory progress - ACTION (Actively working towards change); Intervened by reinforcing change plan / affirming steps taken     Current / Ongoing Stressors and Concerns:   Worry.    Weight issue     Treatment Objective(s) Addressed in This Session:   use at least 6 coping skills for anxiety management in the next 16 weeks       Intervention:  Client reports feeling tired.    Client shares that her worst fear- is dying. So she gives into a craving: \"at least I  happy\"   Explored stress eating  triggers.  Has a good level of awareness.   Has had some success recently with NOT eatin fast food.      REvisited barriers- st 3 small goals.     ASSESSMENT: Current Emotional / Mental Status (status of significant symptoms):   Risk status (Self / Other harm or suicidal ideation)   Client denies current fears or concerns for personal safety.   Client denies current or recent suicidal ideation or behaviors.   Client denies current or recent homicidal ideation or behaviors.   Client denies current or recent self injurious behavior or ideation.   Client denies other safety concerns.   A safety and risk management plan has not been developed at this time, however client was given the after-hours number / 911 should there be a change in any of these risk factors.     Appearance:   Appropriate    Eye Contact:   Good    Psychomotor Behavior: Normal "    Attitude:   Cooperative    Orientation:   All   Speech    Rate / Production: Normal     Volume:  Normal    Mood:    Anxious  Normal   Affect:    Appropriate    Thought Content:  Clear    Thought Form:  Coherent  Logical    Insight:    Good      Medication Review:   No changes to current psychiatric medication(s)     Medication Compliance:   Yes     Changes in Health Issues:   None reported     Chemical Use Review:   Substance Use: Chemical use reviewed, no active concerns identified      Tobacco Use: No current tobacco use.       Collateral Reports Completed:   Routed note to PCP as needed.    PLAN: (Client Tasks / Therapist Tasks / Other)  Review session notes on barriers and integrity challenges.  Formulate 2-3 action steps    Past hw    Start the breath to calm system b4 eating.  Raise heart rate for 5 min    Jeanne Monroy LP                                                         ________________________________________________________________________    Treatment Plan    Client's Name: Bella Scott  YOB: 1985    Date: 2-13-18    DSM-V Diagnoses: MDD    Psychosocial / Contextual Factors: multiple stressors  WHODAS: see dx    Referral / Collaboration:  Referral to another professional/service is not indicated at this time.    Anticipated number of session or this episode of care: 12      MeasurableTreatment Goal(s) related to diagnosis / functional impairment(s)  Goal 1: Client will understand core triggers and learn to use 3-6 skills consistantly to mediate anxiety and stress    I will know I've met my goal when .  50% less anxiety    Objective #A (Client Action)    Client will use at least 3-6 coping skills for anxiety management in the next 16 weeks.  Status: New - Date: 2-13-18     Goal 2: Client will create a realistic small daily steps in her  plan to address weight.    I will know I've met my goal when .  has 3 consistent habits    Objective #A (Client Action)    Client will use at  least 3-6 coping skills for anxiety management in the next 16 weeks.  Status: New - Date: 2-13-18       Client has reviewed and agreed to the above plan.      Jeanne Monroy LP  February 13, 2018

## 2018-04-16 NOTE — TELEPHONE ENCOUNTER
phentermine 15 MG capsule      Last Written Prescription Date:  3/5/18  Last Fill Quantity: 30,   # refills: 0  Last Office Visit: 3/5/2018   Future Office visit:    Next 5 appointments (look out 90 days)     May 08, 2018  4:30 PM CDT   Return Visit with Jeanne Monroy LP   [place] [address] [facility] [address]   [phone]            May 22, 2018  2:30 PM CDT   Return Visit with Jeanne Monroy LP   [address] [address]   [phone]                 Pt asking for Full dose, states she is tolerating this medication well.    Routing refill request to provider for review/approval because:  Drug not on the [facility], [facility] or [facility] refill protocol or controlled substance    Michael Reid   04/16/18 11:01 AM

## 2018-04-17 ENCOUNTER — THERAPY VISIT (OUTPATIENT)
Dept: CHIROPRACTIC MEDICINE | Facility: CLINIC | Age: 33
End: 2018-04-17
Payer: COMMERCIAL

## 2018-04-17 DIAGNOSIS — M40.00 ACQUIRED POSTURAL KYPHOSIS: ICD-10-CM

## 2018-04-17 DIAGNOSIS — M53.3 PAIN IN THE COCCYX: Primary | ICD-10-CM

## 2018-04-17 DIAGNOSIS — M99.04 SOMATIC DYSFUNCTION OF SACRAL REGION: ICD-10-CM

## 2018-04-17 DIAGNOSIS — M54.50 CHRONIC RIGHT-SIDED LOW BACK PAIN WITHOUT SCIATICA: ICD-10-CM

## 2018-04-17 DIAGNOSIS — G89.29 CHRONIC RIGHT-SIDED LOW BACK PAIN WITHOUT SCIATICA: ICD-10-CM

## 2018-04-17 DIAGNOSIS — M99.03 SOMATIC DYSFUNCTION OF LUMBAR REGION: ICD-10-CM

## 2018-04-17 DIAGNOSIS — M53.3 SACRAL PAIN: ICD-10-CM

## 2018-04-17 PROCEDURE — 97035 APP MDLTY 1+ULTRASOUND EA 15: CPT | Performed by: CHIROPRACTOR

## 2018-04-17 PROCEDURE — 98941 CHIROPRACT MANJ 3-4 REGIONS: CPT | Mod: AT | Performed by: CHIROPRACTOR

## 2018-04-17 RX ORDER — PHENTERMINE HYDROCHLORIDE 37.5 MG/1
37.5 CAPSULE ORAL EVERY MORNING
Qty: 30 CAPSULE | Refills: 0 | Status: SHIPPED | OUTPATIENT
Start: 2018-04-17 | End: 2018-05-24

## 2018-04-17 ASSESSMENT — PATIENT HEALTH QUESTIONNAIRE - PHQ9: SUM OF ALL RESPONSES TO PHQ QUESTIONS 1-9: 4

## 2018-04-17 ASSESSMENT — ANXIETY QUESTIONNAIRES: GAD7 TOTAL SCORE: 3

## 2018-04-17 NOTE — TELEPHONE ENCOUNTER
Spoke with pt and informed that she will need an OV and Rx was faxed to [facility], pharmacy will notify patient when ready to be picked up.     Michael Reid   04/17/18 2:14 PM

## 2018-04-17 NOTE — TELEPHONE ENCOUNTER
When looking through her chart I had asked for follow up with this.  I do generally like a BP, pulse and weight check after a month.  I can print for the next dose up but please see if she will schedule an OV with me.    Thank you,  Osmar

## 2018-04-17 NOTE — MR AVS SNAPSHOT
After Visit Summary   4/17/2018    Bella Scott    MRN: [id number]           Patient Information     Date Of Birth          1985        Visit Information        Provider Department      4/17/2018 3:30 PM Cande Adkins DC [facility] [place] [facility]        Today's Diagnoses     Pain in the coccyx    -  1    Somatic dysfunction of lumbar region        Chronic right-sided low back pain without sciatica        Somatic dysfunction of sacral region        Sacral pain        Acquired postural kyphosis           Follow-ups after your visit        Your next 10 appointments already scheduled     May 01, 2018  4:00 PM CDT   Chiro Acupuncture Follow Up with KADY Chicas ([facility]  )    [address] [address] [address] [phone]            May 08, 2018  4:30 PM CDT   Return Visit with Jeanne Monroy LP   [address] [address] [phone]            May 22, 2018  2:30 PM CDT   Return Visit with Jeanne Monroy LP   [address] [address] [phone]              Who to contact     If you have questions or need follow up information about today's clinic visit or your schedule please contact [facility] directly at [phone].  Normal or non-critical lab and imaging results will be communicated to you by MyChart, letter or phone within 4 business days after the clinic has received the results. If you do not hear from us within 7 days, please contact the clinic through MyChart or phone. If you have a critical or abnormal lab result, we will notify you by phone as soon as possible.  Submit refill requests through [employer] or call your pharmacy and they will forward the refill request to us. Please allow 3 business days for your refill to be completed.           Additional Information About Your Visit        [employer] Information     [employer] gives you secure access to your electronic health record. If you see a primary care provider, you can also send messages to your care team and make appointments. If you have questions, please call your primary care clinic.  If you do not have a primary care provider, please call [phone] and they will assist you.        Care EveryWhere ID     This is your Care EveryWhere ID. This could be used by other organizations to access your [place] medical records  [id number]         Blood Pressure from Last 3 Encounters:   03/05/18 120/76   02/22/18 115/85   02/06/18 116/72    Weight from Last 3 Encounters:   03/05/18 104.9 kg (231 lb 3.2 oz)   02/22/18 104.3 kg (230 lb)   02/06/18 104.6 kg (230 lb 9.6 oz)              We Performed the Following     CHIROPRAC MANIP,SPINAL,3-4 REGIONS     ULTRASOUND THERAPY        Primary Care Provider Office Phone # Fax #    Osmar Rendon PA-C [phone]       [id number]  [facility] [address]        Equal Access to Services     [facility]: Hadii aad ku hadasho Soomaali, waaxda luqadaha, qaybta kaalmada adeegyada, waxay precious haymun makayla france . So [facility] [phone].    ATENCIÓN: Si habla español, tiene a guallpa disposición servicios gratCrownpoint Health Care Facilityos de asistencia lingüística. [facility] [phone].    We comply with applicable federal civil rights laws and [place] laws. We do not discriminate on the basis of race, color, national origin, age, disability, sex, sexual orientation, or gender identity.            Thank you!     Thank you for choosing [facility]  for your care. Our goal is always to provide you with excellent care. Hearing back from our patients is one way we can continue to improve our services. Please take a few minutes to complete the written survey that you may receive in the mail after your visit with us. Thank you!             Your  Updated Medication List - Protect others around you: Learn how to safely use, store and throw away your medicines at www.disposemymeds.org.          This list is accurate as of 4/17/18 11:59 PM.  Always use your most recent med list.                   Brand Name Dispense Instructions for use Diagnosis    levothyroxine 112 MCG tablet    SYNTHROID/LEVOTHROID    90 tablet    TAKE 1 TABLET (112 MCG) BY MOUTH DAILY    Hypothyroidism due to acquired atrophy of thyroid       * phentermine 15 MG capsule     30 capsule    Take 1 capsule (15 mg) by mouth every morning    Class 1 obesity due to excess calories without serious comorbidity with body mass index (BMI) of 34.0 to 34.9 in adult       * phentermine 37.5 MG capsule     30 capsule    Take 1 capsule (37.5 mg) by mouth every morning    Class 1 obesity due to excess calories without serious comorbidity with body mass index (BMI) of 34.0 to 34.9 in adult       PRENATABS FA Tabs      Take  by mouth.        VITAMIN D (CHOLECALCIFEROL) PO      Take 2,000 Units by mouth daily        * Notice:  This list has 2 medication(s) that are the same as other medications prescribed for you. Read the directions carefully, and ask your doctor or other care provider to review them with you.

## 2018-04-23 PROBLEM — M40.00 ACQUIRED POSTURAL KYPHOSIS: Status: ACTIVE | Noted: 2018-04-23

## 2018-04-23 PROBLEM — G89.29 CHRONIC BILATERAL LOW BACK PAIN WITHOUT SCIATICA: Status: ACTIVE | Noted: 2018-04-23

## 2018-04-23 PROBLEM — M99.03 SOMATIC DYSFUNCTION OF LUMBAR REGION: Status: ACTIVE | Noted: 2018-04-23

## 2018-04-23 PROBLEM — M53.3 SACRAL PAIN: Status: ACTIVE | Noted: 2018-04-23

## 2018-04-23 PROBLEM — M54.50 CHRONIC BILATERAL LOW BACK PAIN WITHOUT SCIATICA: Status: ACTIVE | Noted: 2018-04-23

## 2018-04-23 PROBLEM — M99.04 SOMATIC DYSFUNCTION OF SACRAL REGION: Status: ACTIVE | Noted: 2018-04-23

## 2018-04-23 PROBLEM — G89.29 CHRONIC RIGHT-SIDED LOW BACK PAIN WITHOUT SCIATICA: Status: ACTIVE | Noted: 2018-04-23

## 2018-04-23 PROBLEM — M54.50 CHRONIC RIGHT-SIDED LOW BACK PAIN WITHOUT SCIATICA: Status: ACTIVE | Noted: 2018-04-23

## 2018-04-23 NOTE — PROGRESS NOTES
Visit #:  2    Subjective:  Bella Scott is a 32 year old female who is seen in f/u up for:        Pain in the coccyx  Somatic dysfunction of lumbar region  Chronic right-sided low back pain without sciatica  Somatic dysfunction of sacral region  Sacral pain  Acquired postural kyphosis.     Since last visit on 3/27/2018,  Bella Scott reports:    Area of chief complaint:  Lumbar :  Symptoms are graded at 5/10. The quality is described as stiff, achey, dull.  Motion has increased, but is still not normal. The pt reports at least 10% subjective improvement since initial presentation. She is using the lumbar support with good results. She was also driving and travelling on holiday and the pain may have increased. She reports tension on the R side of the sacrum and coccyx. The pt denies weakness in the extremities or other unusual sx. Patient feels that they are improved due to a reduction in symptoms.     Since last visit the patient feels that they are 10 percent  improved from last visit.       Objective:  The following was observed:    P: pain elicited on palpation. T5, T8, L4, SACRUM, R PSIS  A: static palpation demonstrates intersegmental asymmetry   R: motion palpation notes restricted motion  T: hypertonicity at: Gluteal, Lumbar erector spine and Quad lumb R>>L    Segmental spinal dysfunction/restrictions found at:   T5, T8, L4, SACRUM, R PSIS      Assessment:    Diagnoses:      1. Pain in the coccyx    2. Somatic dysfunction of lumbar region    3. Chronic right-sided low back pain without sciatica    4. Somatic dysfunction of sacral region    5. Sacral pain    6. Acquired postural kyphosis        Patient's condition:  Patient had restrictions pre-manipulation    Treatment effectiveness:  Post manipulation there is better intersegmental movement and Patient claims to feel looser post manipulation      Procedures:  CMT:  53522 Chiropractic manipulative treatment 3-4 regions performed   Thoracic: Diversified,  T7, T9, Prone  Lumbar: Diversified, L5, Side posture  Pelvis: Drop Table, Sacrum , PSIS Right , Prone    Modalities:  03087: US:  1.5 [place]/cm squared for 8  minutes at 1 mhz  Location: R sacrotuberous ligament    Therapeutic procedures:  None    Response to Treatment  No increase in sx, pt stable     Prognosis: Guarded    Progress towards Goals: Patient is making progress towards the goal  Goals:  SITTING: the patient specific goal is to attain pre-injury status of 2- 3 hours comfortably  Drive 3-7 hours      Recommendations:    Instructions:continue to use lumbar support as directed    Follow-up:  Return to care in 1 week with ACP.

## 2018-05-01 ENCOUNTER — THERAPY VISIT (OUTPATIENT)
Dept: CHIROPRACTIC MEDICINE | Facility: CLINIC | Age: 33
End: 2018-05-01
Payer: COMMERCIAL

## 2018-05-01 DIAGNOSIS — M99.04 SOMATIC DYSFUNCTION OF SACRAL REGION: ICD-10-CM

## 2018-05-01 DIAGNOSIS — M54.50 CHRONIC RIGHT-SIDED LOW BACK PAIN WITHOUT SCIATICA: Primary | ICD-10-CM

## 2018-05-01 DIAGNOSIS — G89.29 CHRONIC RIGHT-SIDED LOW BACK PAIN WITHOUT SCIATICA: Primary | ICD-10-CM

## 2018-05-01 DIAGNOSIS — M99.03 SOMATIC DYSFUNCTION OF LUMBAR REGION: ICD-10-CM

## 2018-05-01 DIAGNOSIS — M53.3 PAIN IN THE COCCYX: ICD-10-CM

## 2018-05-01 DIAGNOSIS — M53.3 SACRAL PAIN: ICD-10-CM

## 2018-05-01 PROCEDURE — 97810 ACUP 1/> WO ESTIM 1ST 15 MIN: CPT | Mod: GA | Performed by: CHIROPRACTOR

## 2018-05-01 PROCEDURE — 98941 CHIROPRACT MANJ 3-4 REGIONS: CPT | Mod: AT | Performed by: CHIROPRACTOR

## 2018-05-02 PROBLEM — M40.00 ACQUIRED POSTURAL KYPHOSIS: Status: RESOLVED | Noted: 2018-04-23 | Resolved: 2018-05-02

## 2018-05-02 NOTE — PROGRESS NOTES
Visit #:  3    Subjective:  Bella Scott is a 32 year old female who is seen in f/u up for:        Somatic dysfunction of lumbar region  Chronic right-sided low back pain without sciatica  Somatic dysfunction of sacral region  Sacral pain  Acquired postural kyphosis  Pain in the coccyx.     Since last visit,  Bella Scott reports:    Area of chief complaint:  Lumbar :  Symptoms are graded at 4/10. The quality is described as stiff, achey, dull.  Motion has increased, but is still not normal. The pt reports at least 20% subjective improvement since initial presentation. She noted moderate soreness in the anterior legs post adjustment that decreased several days later. The pt reported ability to sit for longer periods with less pain in the tailbone and sacrum. She is using the lumbar support with good results. The pt denies weakness or other unusual sx.     Since last visit the patient feels that they are 20 percent  improved from last visit.       Objective:  The following was observed:    P: pain elicited on palpation. T5, T8, L4, SACRUM, R PSIS  A: static palpation demonstrates intersegmental asymmetry   R: motion palpation notes restricted motion  T: hypertonicity at: Gluteal, Lumbar erector spine and Quad lumb R>>L    Segmental spinal dysfunction/restrictions found at:   T5, T8, L4, SACRUM, R PSIS      Assessment:    Diagnoses:      1. Somatic dysfunction of lumbar region    2. Chronic right-sided low back pain without sciatica    3. Somatic dysfunction of sacral region    4. Sacral pain    5. Acquired postural kyphosis    6. Pain in the coccyx        Patient's condition:  Patient responding slow and well to therapy    Treatment effectiveness:  Post manipulation there is better intersegmental movement and Patient claims to feel looser post manipulation      Procedures:  CMT:  40075 Chiropractic manipulative treatment 3-4 regions performed   Thoracic: Diversified, T7, T9, Prone  Lumbar: Diversified, L5, Side  posture  Pelvis: Drop Table, Sacrum , PSIS Right , Prone    Modalities:  ACP: karl points to the L gluteal and coccyx, huatuochiachi points in the lumbar spine 15 minutes prone    Therapeutic procedures:  None    Response to Treatment  No increase in sx, pt stable     Prognosis: Guarded    Progress towards Goals: Patient is making progress towards the goal  Goals:  SITTING: the patient specific goal is to attain pre-injury status of 2- 3 hours comfortably  Drive 3-7 hours      Recommendations:    Instructions:continue to use lumbar support as directed    Follow-up:  Return to care in 1 week with ACP.

## 2018-05-08 ENCOUNTER — OFFICE VISIT (OUTPATIENT)
Dept: PSYCHOLOGY | Facility: CLINIC | Age: 33
End: 2018-05-08
Payer: COMMERCIAL

## 2018-05-08 DIAGNOSIS — F32.0 MILD MAJOR DEPRESSION (H): Primary | ICD-10-CM

## 2018-05-08 PROCEDURE — 90834 PSYTX W PT 45 MINUTES: CPT | Performed by: PSYCHOLOGIST

## 2018-05-08 ASSESSMENT — PATIENT HEALTH QUESTIONNAIRE - PHQ9: 5. POOR APPETITE OR OVEREATING: NOT AT ALL

## 2018-05-08 ASSESSMENT — ANXIETY QUESTIONNAIRES
2. NOT BEING ABLE TO STOP OR CONTROL WORRYING: SEVERAL DAYS
1. FEELING NERVOUS, ANXIOUS, OR ON EDGE: SEVERAL DAYS
GAD7 TOTAL SCORE: 7
6. BECOMING EASILY ANNOYED OR IRRITABLE: NEARLY EVERY DAY
3. WORRYING TOO MUCH ABOUT DIFFERENT THINGS: SEVERAL DAYS
7. FEELING AFRAID AS IF SOMETHING AWFUL MIGHT HAPPEN: SEVERAL DAYS
5. BEING SO RESTLESS THAT IT IS HARD TO SIT STILL: NOT AT ALL

## 2018-05-08 NOTE — MR AVS SNAPSHOT
After Visit Summary   5/8/2018    Bella Scott    MRN: [id number]           Patient Information     Date Of Birth          1985        Visit Information        Provider Department      5/8/2018 4:30 PM Jeanne Monroy LP [facility]      Today's Diagnoses     Mild major depression (H)    -  1       Follow-ups after your visit        Your next 10 appointments already scheduled     May 17, 2018  2:30 PM CDT   Chiro Acupuncture Follow Up with KADY Chicas [place] [facility] ([facility]  )    [address] [facility]  [address]  [facility] [address] [phone]            May 22, 2018  2:30 PM CDT   Return Visit with Jeanne Monroy LP   [facility] ([place] [address] [facility] [address] [phone]            Jun 19, 2018  3:30 PM CDT   Return Visit with Jeanne Monroy LP   [facility] ([facility]    [address] [facility] [address] [phone]            Jul 16, 2018  3:30 PM CDT   Return Visit with Jeanne Monroy LP   [facility] ([facility]    [address] [facility] [address] [phone]              Who to contact     If you have questions or need follow up information about today's clinic visit or your schedule please contact [facility] directly at [phone].  Normal or non-critical lab and imaging results will be communicated to you by MyChart, letter or phone within 4 business days after the clinic has received the results. If you do not hear from us within 7 days, please contact the clinic through [employer] or phone. If you have a critical or abnormal lab result, we will notify you by phone as soon as possible.  Submit refill requests through [employer] or call your pharmacy and they will forward the refill request to us. Please allow 3 business days  for your refill to be completed.          Additional Information About Your Visit        [employer] Information     [employer] gives you secure access to your electronic health record. If you see a primary care provider, you can also send messages to your care team and make appointments. If you have questions, please call your primary care clinic.  If you do not have a primary care provider, please call [phone] and they will assist you.        Care EveryWhere ID     This is your Care EveryWhere ID. This could be used by other organizations to access your [place] medical records  [id number]         Blood Pressure from Last 3 Encounters:   03/05/18 120/76   02/22/18 115/85   02/06/18 116/72    Weight from Last 3 Encounters:   03/05/18 104.9 kg (231 lb 3.2 oz)   02/22/18 104.3 kg (230 lb)   02/06/18 104.6 kg (230 lb 9.6 oz)              Today, you had the following     No orders found for display       Primary Care Provider Office Phone # Fax #    Osmar Cindy Rendon PA-C [phone]       [id number]  [facility] [address]        Equal Access to Services     [facility] : Hadii aad ku hadasho Soomaali, waaxda luqadaha, qaybta kaalmada adeegyada, kaila lang haymun makayla france . So [facility] [phone].    ATENCIÓN: Si habla español, tiene a guallpa disposición servicios gratuitos de asistencia lingüística. Llame al [phone].    We comply with applicable federal civil rights laws and [place] laws. We do not discriminate on the basis of race, color, national origin, age, disability, sex, sexual orientation, or gender identity.            Thank you!     Thank you for choosing [facility]  for your care. Our goal is always to provide you with excellent care. Hearing back from our patients is one way we can continue to improve our services. Please take a few minutes to complete the written survey that you may receive in the mail after your visit with us. Thank  you!             Your Updated Medication List - Protect others around you: Learn how to safely use, store and throw away your medicines at www.disposemymeds.org.          This list is accurate as of 5/8/18  5:30 PM.  Always use your most recent med list.                   Brand Name Dispense Instructions for use Diagnosis    levothyroxine 112 MCG tablet    SYNTHROID/LEVOTHROID    90 tablet    TAKE 1 TABLET (112 MCG) BY MOUTH DAILY    Hypothyroidism due to acquired atrophy of thyroid       * phentermine 15 MG capsule     30 capsule    Take 1 capsule (15 mg) by mouth every morning    Class 1 obesity due to excess calories without serious comorbidity with body mass index (BMI) of 34.0 to 34.9 in adult       * phentermine 37.5 MG capsule     30 capsule    Take 1 capsule (37.5 mg) by mouth every morning    Class 1 obesity due to excess calories without serious comorbidity with body mass index (BMI) of 34.0 to 34.9 in adult       PRENATABS FA Tabs      Take  by mouth.        VITAMIN D (CHOLECALCIFEROL) PO      Take 2,000 Units by mouth daily        * Notice:  This list has 2 medication(s) that are the same as other medications prescribed for you. Read the directions carefully, and ask your doctor or other care provider to review them with you.

## 2018-05-08 NOTE — PROGRESS NOTES
"                                             Progress Note    Client Name: Bella Scott  Date: 4-16 -18         Service Type: Individual      Session Start Time: 9:40  Session End Time: 10:30      Session Length: 38-52 min     Session #: 4     Attendees: Client    Treatment Plan Last Reviewed: today  PHQ-9 / DESIREE-7 : see flow sheets.     DATA      Progress Since Last Session (Related to Symptoms / Goals / Homework):   Symptoms: Stable but with feeling anxious , irritable, low energy, low mood. Feeling bad about self. Sleep disruption due to kids and or back pain    Homework: Completed in session      Episode of Care Goals: Satisfactory progress - ACTION (Actively working towards change); Intervened by reinforcing change plan / affirming steps taken     Current / Ongoing Stressors and Concerns:   Worry.    Weight issue     Treatment Objective(s) Addressed in This Session:   use at least 6 coping skills for anxiety management in the next 16 weeks       Intervention:  Client reports have the last 5 days - irritable, tired.     Client shares that she has dropped 9 pounds.   States that she has had 0 craving.    Is eating smaller portions:  Stopping when Im done.   Has a protein shake on way out of work.  Not eating after 8 pm.  Listening to pod cast- \"weight loss for busy physicains\" reviewed scarcity concept.        ASSESSMENT: Current Emotional / Mental Status (status of significant symptoms):   Risk status (Self / Other harm or suicidal ideation)   Client denies current fears or concerns for personal safety.   Client denies current or recent suicidal ideation or behaviors.   Client denies current or recent homicidal ideation or behaviors.   Client denies current or recent self injurious behavior or ideation.   Client denies other safety concerns.   A safety and risk management plan has not been developed at this time, however client was given the after-hours number / 911 should there be a change in any of these risk " factors.     Appearance:   Appropriate    Eye Contact:   Good    Psychomotor Behavior: Normal    Attitude:   Cooperative    Orientation:   All   Speech    Rate / Production: Normal     Volume:  Normal    Mood:    Anxious  Normal   Affect:    Appropriate    Thought Content:  Clear    Thought Form:  Coherent  Logical    Insight:    Good      Medication Review:   No changes to current psychiatric medication(s)     Medication Compliance:   Yes     Changes in Health Issues:   None reported     Chemical Use Review:   Substance Use: Chemical use reviewed, no active concerns identified      Tobacco Use: No current tobacco use.       Collateral Reports Completed:   Routed note to PCP as needed.    PLAN: (Client Tasks / Therapist Tasks / Other)    Past hw  Review session notes on barriers and integrity challenges.  Formulate 2-3 action steps    Past hw    Start the breath to calm system b4 eating.  Raise heart rate for 5 min    Jeanne Monroy LP                                                         ________________________________________________________________________    Treatment Plan    Client's Name: Bella Scott  YOB: 1985    Date: 2-13-18    DSM-V Diagnoses: MDD    Psychosocial / Contextual Factors: multiple stressors  WHODAS: see dx    Referral / Collaboration:  Referral to another professional/service is not indicated at this time.    Anticipated number of session or this episode of care: 12      MeasurableTreatment Goal(s) related to diagnosis / functional impairment(s)  Goal 1: Client will understand core triggers and learn to use 3-6 skills consistantly to mediate anxiety and stress    I will know I've met my goal when .  50% less anxiety    Objective #A (Client Action)    Client will use at least 3-6 coping skills for anxiety management in the next 16 weeks.  Status: New - Date: 2-13-18   Update 5-08-18 client using skills . Has ongoing inner dialogue - many topics producing fear scripts.  Depression is better.     Goal 2: Client will create a realistic small daily steps in her  plan to address weight.    I will know I've met my goal when .  has 3 consistent habits    Objective #A (Client Action)    Client will use at least 3-6 coping skills for anxiety management in the next 16 weeks.  Status: New - Date: 2-13-18   Update 5-08-18  Client shares that she has dropped 9 pounds on new medication. Has implemented 3 behavior changes.      Client has reviewed and agreed to the above plan.      Jeanne Monroy LP  February 13, 2018

## 2018-05-09 ASSESSMENT — ANXIETY QUESTIONNAIRES: GAD7 TOTAL SCORE: 7

## 2018-05-09 ASSESSMENT — PATIENT HEALTH QUESTIONNAIRE - PHQ9: SUM OF ALL RESPONSES TO PHQ QUESTIONS 1-9: 5

## 2018-05-17 ENCOUNTER — THERAPY VISIT (OUTPATIENT)
Dept: CHIROPRACTIC MEDICINE | Facility: CLINIC | Age: 33
End: 2018-05-17
Payer: COMMERCIAL

## 2018-05-17 DIAGNOSIS — M54.50 CHRONIC RIGHT-SIDED LOW BACK PAIN WITHOUT SCIATICA: ICD-10-CM

## 2018-05-17 DIAGNOSIS — M53.3 SACRAL PAIN: Primary | ICD-10-CM

## 2018-05-17 DIAGNOSIS — M53.3 PAIN IN THE COCCYX: ICD-10-CM

## 2018-05-17 DIAGNOSIS — M99.04 SOMATIC DYSFUNCTION OF SACRAL REGION: ICD-10-CM

## 2018-05-17 DIAGNOSIS — M99.03 SOMATIC DYSFUNCTION OF LUMBAR REGION: ICD-10-CM

## 2018-05-17 DIAGNOSIS — G89.29 CHRONIC RIGHT-SIDED LOW BACK PAIN WITHOUT SCIATICA: ICD-10-CM

## 2018-05-17 PROCEDURE — 98941 CHIROPRACT MANJ 3-4 REGIONS: CPT | Mod: AT | Performed by: CHIROPRACTOR

## 2018-05-17 PROCEDURE — 97035 APP MDLTY 1+ULTRASOUND EA 15: CPT | Performed by: CHIROPRACTOR

## 2018-05-17 NOTE — PROGRESS NOTES
Visit #:  4    Subjective:  Bella Scott is a 32 year old female who is seen in f/u up for:        Somatic dysfunction of lumbar region  Chronic right-sided low back pain without sciatica  Somatic dysfunction of sacral region  Sacral pain  Pain in the coccyx.     Since last visit,  Bella Scott reports:    Area of chief complaint:  Lumbar :  Symptoms are graded at 3/10. The quality is described as stiff, achey, dull.  Motion has increased, but is still not normal. The pt reports at least 50% subjective improvement since initial presentation. She notes ability to sit for longer periods with less pain in the tailbone. The pt is now exercising and losing weight which she feels is contributing to the reduction of sx. She notes pain in the mid back and low back today and reports some moderate stiffness in the areas. The pt denies weakness in the extremities or other unusual sx.     Since last visit the patient feels that they are 50 percent  improved from last visit.       Objective:  The following was observed:    P: pain elicited on palpation. T5, T8, L4, SACRUM, R PSIS  A: static palpation demonstrates intersegmental asymmetry   R: motion palpation notes restricted motion  T: hypertonicity at: Gluteal, Lumbar erector spine and Quad lumb R>>L    Segmental spinal dysfunction/restrictions found at:   T5, T8, L4, SACRUM, R PSIS      Assessment:    Diagnoses:      1. Sacral pain    2. Somatic dysfunction of lumbar region    3. Chronic right-sided low back pain without sciatica    4. Somatic dysfunction of sacral region    5. Pain in the coccyx        Patient's condition:  Patient responding well to therapy    Treatment effectiveness:  Post manipulation there is better intersegmental movement and Patient claims to feel looser post manipulation      Procedures:  CMT:  83710 Chiropractic manipulative treatment 3-4 regions performed   Thoracic: Diversified, T7, T9, Prone  Lumbar: Diversified, L5, Side posture  Pelvis: Drop  Table, Sacrum , PSIS Right , Prone    Modalities:  US therapy: 1.6 verde/cm2 to the coccyx and lumbar musculature 8 minutes 1 MHZ    Therapeutic procedures:  None    Response to Treatment  No increase in sx, pt stable     Prognosis: Guarded    Progress towards Goals: Patient is making progress towards the goal  Goals:  SITTING: the patient specific goal is to attain pre-injury status of 2- 3 hours comfortably  Drive 3-7 hours      Recommendations:    Instructions:continue to use lumbar support as directed    Follow-up:  Return to care in 2 weeks with US therapy  Press-ups, piriformis stretching

## 2018-05-24 ENCOUNTER — OFFICE VISIT (OUTPATIENT)
Dept: FAMILY MEDICINE | Facility: CLINIC | Age: 33
End: 2018-05-24
Payer: COMMERCIAL

## 2018-05-24 VITALS
BODY MASS INDEX: 32.17 KG/M2 | WEIGHT: 216.3 LBS | RESPIRATION RATE: 16 BRPM | DIASTOLIC BLOOD PRESSURE: 74 MMHG | TEMPERATURE: 98.2 F | HEART RATE: 76 BPM | SYSTOLIC BLOOD PRESSURE: 110 MMHG

## 2018-05-24 DIAGNOSIS — E03.4 HYPOTHYROIDISM DUE TO ACQUIRED ATROPHY OF THYROID: ICD-10-CM

## 2018-05-24 DIAGNOSIS — E66.811 CLASS 1 OBESITY WITHOUT SERIOUS COMORBIDITY WITH BODY MASS INDEX (BMI) OF 32.0 TO 32.9 IN ADULT, UNSPECIFIED OBESITY TYPE: Primary | ICD-10-CM

## 2018-05-24 PROCEDURE — 99213 OFFICE O/P EST LOW 20 MIN: CPT | Performed by: PHYSICIAN ASSISTANT

## 2018-05-24 RX ORDER — PHENTERMINE HYDROCHLORIDE 37.5 MG/1
37.5 CAPSULE ORAL EVERY MORNING
Qty: 30 CAPSULE | Refills: 0 | Status: SHIPPED | OUTPATIENT
Start: 2018-06-24 | End: 2018-05-24

## 2018-05-24 RX ORDER — PHENTERMINE HYDROCHLORIDE 37.5 MG/1
37.5 CAPSULE ORAL EVERY MORNING
Qty: 30 CAPSULE | Refills: 0 | Status: SHIPPED | OUTPATIENT
Start: 2018-05-24 | End: 2018-05-24

## 2018-05-24 RX ORDER — PHENTERMINE HYDROCHLORIDE 37.5 MG/1
37.5 CAPSULE ORAL EVERY MORNING
Qty: 30 CAPSULE | Refills: 0 | Status: SHIPPED | OUTPATIENT
Start: 2018-07-24 | End: 2018-08-16

## 2018-05-24 NOTE — PROGRESS NOTES
HPI   SUBJECTIVE:   Bella Scott is a 32 year old female who presents to clinic today for the following health issues:    Medication Followup of: phentermine 37.5 MG capsule    Taking Medication as prescribed: yes    Side Effects:  None    Medication Helping Symptoms:  yes     Doing very well.  Takes away all her cravings.  Very happy with things so far.  Suppressing appetite as well.  Has made other good food changes as well.  Less sugar, carbs, sweets.  Also exercising more now.  Goal weight is 155 which would get her into her BMI range that is normal for her height.    Wt Readings from Last 4 Encounters:   05/24/18 216 lb 4.8 oz (98.1 kg)   03/05/18 231 lb 3.2 oz (104.9 kg)   02/22/18 230 lb (104.3 kg)   02/06/18 230 lb 9.6 oz (104.6 kg)       PROBLEMS TO ADD ON...  Hypothyroidism Follow-up      Since last visit, patient describes the following symptoms: Weight stable, no hair loss, no skin changes, no constipation, no loose stools and fatigue      Problem list and histories reviewed & adjusted, as indicated.  Additional history: as documented    Labs reviewed in EPIC    Reviewed and updated as needed this visit by clinical staff  Tobacco  Allergies  Meds  Problems  Med Hx  Surg Hx  Fam Hx  Soc Hx        Reviewed and updated as needed this visit by Provider         ROS:  Constitutional, HEENT, cardiovascular, pulmonary, gi and gu systems are negative, except as otherwise noted.    OBJECTIVE:     /74 (BP Location: Right arm, Patient Position: Chair, Cuff Size: Adult Regular)  Pulse 76  Temp 98.2  F (36.8  C) (Oral)  Resp 16  Wt 216 lb 4.8 oz (98.1 kg)  BMI 32.17 kg/m2  Body mass index is 32.17 kg/(m^2).  GENERAL: healthy, alert and no distress  NECK: no adenopathy, no asymmetry, masses, or scars and thyroid normal to palpation  CV: regular rate and rhythm, normal S1 S2, no S3 or S4, no murmur, click or rub, no peripheral edema and peripheral pulses strong  MS: no gross musculoskeletal defects  noted, no edema  SKIN: no suspicious lesions or rashes  PSYCH: mentation appears normal, affect normal/bright    Diagnostic Test Results:  none    ASSESSMENT/PLAN:   1. Class 1 obesity without serious comorbidity with body mass index (BMI) of 32.0 to 32.9 in adult, unspecified obesity type  Refilled, she is doing well on this.  Recommend diet and exercise along with this.  She will follow up in 3 months.  - phentermine 37.5 MG capsule; Take 1 capsule (37.5 mg) by mouth every morning  Dispense: 30 capsule; Refill: 0    2. Hypothyroidism due to acquired atrophy of thyroid  She forgot to go to lab.  Ordered for future use.  - TSH with free T4 reflex; Future        Osmar Rendon PA-C  [facility]      Physical Exam

## 2018-05-24 NOTE — MR AVS SNAPSHOT
After Visit Summary   5/24/2018    Bella Scott    MRN: [id number]           Patient Information     Date Of Birth          1985        Visit Information        Provider Department      5/24/2018 1:40 PM Osmar Rendon PA-C [facility]        Today's Diagnoses     Class 1 obesity without serious comorbidity with body mass index (BMI) of 32.0 to 32.9 in adult, unspecified obesity type    -  1    Hypothyroidism due to acquired atrophy of thyroid           Follow-ups after your visit        Follow-up notes from your care team     Return in about 3 months (around 8/24/2018) for Med Check.      Your next 10 appointments already scheduled     Jun 19, 2018  3:30 PM CDT   Return Visit with Jeanne Monroy LP   [address] [address] [phone]            Jul 16, 2018  3:30 PM CDT   Return Visit with Jeanne Monroy LP   [facility] ([address] [address] [phone]            Aug 20, 2018  4:00 PM CDT   SHORT with Osmar Rendon PA-C   [facility] ([address], [address]  [facility] [address] [phone]              Who to contact     If you have questions or need follow up information about today's clinic visit or your schedule please contact [facility] directly at [phone].  Normal or non-critical lab and imaging results will be communicated to you by MyChart, letter or phone within 4 business days after the clinic has received the results. If you do not hear from us within 7 days, please contact the clinic through [employer] or phone. If you have a critical or abnormal lab result, we will notify you by phone as soon as possible.  Submit refill requests through [employer] or call your pharmacy and they will forward the refill request  to us. Please allow 3 business days for your refill to be completed.          Additional Information About Your Visit        [employer] Information     [employer] gives you secure access to your electronic health record. If you see a primary care provider, you can also send messages to your care team and make appointments. If you have questions, please call your primary care clinic.  If you do not have a primary care provider, please call [phone] and they will assist you.        Care EveryWhere ID     This is your Care EveryWhere ID. This could be used by other organizations to access your [place] medical records  [id number]        Your Vitals Were     Pulse Temperature Respirations BMI (Body Mass Index)          76 98.2  F (36.8  C) (Oral) 16 32.17 kg/m2         Blood Pressure from Last 3 Encounters:   05/24/18 110/74   03/05/18 120/76   02/22/18 115/85    Weight from Last 3 Encounters:   05/24/18 216 lb 4.8 oz (98.1 kg)   03/05/18 231 lb 3.2 oz (104.9 kg)   02/22/18 230 lb (104.3 kg)              We Performed the Following     TSH with free T4 reflex          Today's Medication Changes          These changes are accurate as of 5/24/18  2:16 PM.  If you have any questions, ask your nurse or doctor.               These medicines have changed or have updated prescriptions.        Dose/Directions    phentermine 37.5 MG capsule   This may have changed:    - medication strength  - how much to take  - These instructions start on 7/24/2018. If you are unsure what to do until then, ask your doctor or other care provider.   Used for:  Class 1 obesity without serious comorbidity with body mass index (BMI) of 32.0 to 32.9 in adult, unspecified obesity type   Changed by:  Osmar Rendon PA-C        Dose:  37.5 mg   Start taking on:  7/24/2018   Take 1 capsule (37.5 mg) by mouth every morning   Quantity:  30 capsule   Refills:  0            Where to get your medicines      Some of these will need a paper prescription  and others can be bought over the counter.  Ask your nurse if you have questions.     Bring a paper prescription for each of these medications     phentermine 37.5 MG capsule                Primary Care Provider Office Phone # Fax Mathew Rendon PA-C [phone]       [id number]  [facility] [address]        Equal Access to Services     KYLER DUARTE : Hadii aad ku hadasho Soomaali, waaxda luqadaha, qaybta kaalmada adeegyada, waxay idiin hayaan adeeg kharash latatiana . So [facility] [phone].    ATENCIÓN: Si habla español, tiene a guallpa disposición servicios gratuitos de asistencia lingüística. Llame al [phone].    We comply with applicable federal civil rights laws and [place] laws. We do not discriminate on the basis of race, color, national origin, age, disability, sex, sexual orientation, or gender identity.            Thank you!     Thank you for choosing [facility]  for your care. Our goal is always to provide you with excellent care. Hearing back from our patients is one way we can continue to improve our services. Please take a few minutes to complete the written survey that you may receive in the mail after your visit with us. Thank you!             Your Updated Medication List - Protect others around you: Learn how to safely use, store and throw away your medicines at www.disposemymeds.org.          This list is accurate as of 5/24/18  2:16 PM.  Always use your most recent med list.                   Brand Name Dispense Instructions for use Diagnosis    levothyroxine 112 MCG tablet    SYNTHROID/LEVOTHROID    90 tablet    TAKE 1 TABLET (112 MCG) BY MOUTH DAILY    Hypothyroidism due to acquired atrophy of thyroid       phentermine 37.5 MG capsule   Start taking on:  7/24/2018     30 capsule    Take 1 capsule (37.5 mg) by mouth every morning    Class 1 obesity without serious comorbidity with body mass index (BMI) of 32.0 to 32.9 in adult, unspecified obesity  type       PRENATABS FA Tabs      Take  by mouth.        VITAMIN D (CHOLECALCIFEROL) PO      Take 2,000 Units by mouth daily

## 2018-05-24 NOTE — NURSING NOTE
"Chief Complaint   Patient presents with     Recheck Medication     phentermine.      Weight Check     Initial /74 (BP Location: Right arm, Patient Position: Chair, Cuff Size: Adult Regular)  Pulse 76  Temp 98.2  F (36.8  C) (Oral)  Resp 16  Wt 216 lb 4.8 oz (98.1 kg)  BMI 32.17 kg/m2 Estimated body mass index is 32.17 kg/(m^2) as calculated from the following:    Height as of 2/22/18: 5' 8.75\" (1.746 m).    Weight as of this encounter: 216 lb 4.8 oz (98.1 kg).  BP completed using cuff size regular right arm    Lisa Magill, CMA    "

## 2018-06-19 ENCOUNTER — OFFICE VISIT (OUTPATIENT)
Dept: PSYCHOLOGY | Facility: CLINIC | Age: 33
End: 2018-06-19
Payer: COMMERCIAL

## 2018-06-19 DIAGNOSIS — E03.4 HYPOTHYROIDISM DUE TO ACQUIRED ATROPHY OF THYROID: ICD-10-CM

## 2018-06-19 DIAGNOSIS — E66.811 CLASS 1 OBESITY WITHOUT SERIOUS COMORBIDITY WITH BODY MASS INDEX (BMI) OF 32.0 TO 32.9 IN ADULT, UNSPECIFIED OBESITY TYPE: ICD-10-CM

## 2018-06-19 DIAGNOSIS — F32.0 MILD MAJOR DEPRESSION (H): Primary | ICD-10-CM

## 2018-06-19 PROCEDURE — 84443 ASSAY THYROID STIM HORMONE: CPT | Performed by: PHYSICIAN ASSISTANT

## 2018-06-19 PROCEDURE — 90834 PSYTX W PT 45 MINUTES: CPT | Performed by: PSYCHOLOGIST

## 2018-06-19 PROCEDURE — 36415 COLL VENOUS BLD VENIPUNCTURE: CPT | Performed by: PHYSICIAN ASSISTANT

## 2018-06-19 RX ORDER — PHENTERMINE HYDROCHLORIDE 37.5 MG/1
37.5 CAPSULE ORAL EVERY MORNING
Qty: 30 CAPSULE | Refills: 0 | Status: CANCELLED | OUTPATIENT
Start: 2018-07-24

## 2018-06-19 NOTE — TELEPHONE ENCOUNTER
Patient has written rx's for June and July.  She should be able to just bring them to be filled at the pharmacy.  They should be able to fill them a few days early if needed.    Jacque Montez RN

## 2018-06-19 NOTE — PROGRESS NOTES
"                                             Progress Note    Client Name: Bella Scott  Date: 6-19 -18         Service Type: Individual      Session Start Time: 3:45  Session End Time: 4:30      Session Length: 38-52 min     Session #: 5     Attendees: Client    Treatment Plan Last Reviewed: today  PHQ-9 / DESIREE-7 : see flow sheets.     DATA      Progress Since Last Session (Related to Symptoms / Goals / Homework):   Symptoms: Stable but with irritable feelings, low energy, low mood. Feeling bad about self.     Homework: Completed in session      Episode of Care Goals: Satisfactory progress - ACTION (Actively working towards change); Intervened by reinforcing change plan / affirming steps taken     Current / Ongoing Stressors and Concerns:   Worry    Weight issue     Treatment Objective(s) Addressed in This Session:   use at least 6 coping skills for anxiety management in the next 16 weeks       Intervention:  Client reports - extra tired today    Client shares that she has dropped 20 pounds, but 'med seems to have lost its intensity\".      Continues with behavior change overall:  Is eating smaller portions:  Stopping when I;m done.   Has a protein shake on way out of work. Not eating after 8 pm.    Focus of session: anxiety:  Explored Gomes \"control of thoughts and feelings\" questions from the happiness trap.  Client able to detach from some thoughts  Explored OCD like nature of the 2 scripts around death.  Fear mechanism discussed.  Medication option / referral discussed.     ASSESSMENT: Current Emotional / Mental Status (status of significant symptoms):   Risk status (Self / Other harm or suicidal ideation)   Client denies current fears or concerns for personal safety.   Client denies current or recent suicidal ideation or behaviors.   Client denies current or recent homicidal ideation or behaviors.   Client denies current or recent self injurious behavior or ideation.   Client denies other safety concerns.   A " "safety and risk management plan has not been developed at this time, however client was given the after-hours number / 911 should there be a change in any of these risk factors.     Appearance:   Appropriate    Eye Contact:   Good    Psychomotor Behavior: Normal    Attitude:   Cooperative    Orientation:   All   Speech    Rate / Production: Normal     Volume:  Normal    Mood:    Anxious  Normal   Affect:    Appropriate    Thought Content:  Clear    Thought Form:  Coherent  Logical    Insight:    Good      Medication Review:   No changes to current psychiatric medication(s)     Medication Compliance:   Yes     Changes in Health Issues:   None reported     Chemical Use Review:   Substance Use: Chemical use reviewed, no active concerns identified      Tobacco Use: No current tobacco use.       Collateral Reports Completed:   Routed note to PCP as needed.    PLAN: (Client Tasks / Therapist Tasks / Other)  Review \" control of thoughts and feelings\" questions from the happiness trap.  Consider new medication trial  Past hw  Review session notes on barriers and integrity challenges.  Formulate 2-3 action steps    Past hw    Start the breath to calm system b4 eating.  Raise heart rate for 5 min    Jeanne Monroy LP                                                         ________________________________________________________________________    Treatment Plan    Client's Name: Bella Scott  YOB: 1985    Date: 2-13-18    DSM-V Diagnoses: MDD    Psychosocial / Contextual Factors: multiple stressors  WHODAS: see dx    Referral / Collaboration:  Referral to another professional/service is not indicated at this time.    Anticipated number of session or this episode of care: 12      MeasurableTreatment Goal(s) related to diagnosis / functional impairment(s)  Goal 1: Client will understand core triggers and learn to use 3-6 skills consistantly to mediate anxiety and stress    I will know I've met my goal when .  " 50% less anxiety    Objective #A (Client Action)    Client will use at least 3-6 coping skills for anxiety management in the next 16 weeks.  Status: New - Date: 2-13-18   Update 5-08-18 client using skills . Has ongoing inner dialogue - many topics producing fear scripts. Depression is better.     Goal 2: Client will create a realistic small daily steps in her  plan to address weight.    I will know I've met my goal when .  has 3 consistent habits    Objective #A (Client Action)    Client will use at least 3-6 coping skills for anxiety management in the next 16 weeks.  Status: New - Date: 2-13-18   Update 5-08-18  Client shares that she has dropped 9 pounds on new medication. Has implemented 3 behavior changes.      Client has reviewed and agreed to the above plan.      Jeanne Monroy LP  February 13, 2018

## 2018-06-19 NOTE — MR AVS SNAPSHOT
After Visit Summary   6/19/2018    Bella Scott    MRN: [id number]           Patient Information     Date Of Birth          1985        Visit Information        Provider Department      6/19/2018 3:30 PM Jeanne Monroy LP [facility]      Today's Diagnoses     Mild major depression (H)    -  1       Follow-ups after your visit        Your next 10 appointments already scheduled     Jul 16, 2018  3:30 PM CDT   Return Visit with Jeanne Monroy LP   [facility] ([address] [address] [phone]            Aug 20, 2018  4:00 PM CDT   SHORT with Osmar Rendon PA-C   [facility] ([address], [address]  [facility] [address] [phone]              Who to contact     If you have questions or need follow up information about today's clinic visit or your schedule please contact [facility] directly at [phone].  Normal or non-critical lab and imaging results will be communicated to you by [employer], letter or phone within 4 business days after the clinic has received the results. If you do not hear from us within 7 days, please contact the clinic through [employer] or phone. If you have a critical or abnormal lab result, we will notify you by phone as soon as possible.  Submit refill requests through [employer] or call your pharmacy and they will forward the refill request to us. Please allow 3 business days for your refill to be completed.          Additional Information About Your Visit        MyChart Information     [employer] gives you secure access to your electronic health record. If you see a primary care provider, you can also send messages to your care team and make appointments. If you have questions, please call your primary care clinic.  If you do not have a primary care provider, please  call [phone] and they will assist you.        Care EveryWhere ID     This is your Care EveryWhere ID. This could be used by other organizations to access your [place] medical records  [id number]         Blood Pressure from Last 3 Encounters:   05/24/18 110/74   03/05/18 120/76   02/22/18 115/85    Weight from Last 3 Encounters:   05/24/18 98.1 kg (216 lb 4.8 oz)   03/05/18 104.9 kg (231 lb 3.2 oz)   02/22/18 104.3 kg (230 lb)              Today, you had the following     No orders found for display       Primary Care Provider Office Phone # Fax #    Osmar Rendon PA-C [phone]  [address] [facility] [address]        Equal Access to Services     [facility] GERALD : Hadii aad ku hadasho Soomaali, waaxda luqadaha, qaybta kaalmada adeegyada, kaila fr[place] . So [facility] [phone].    ATENCIÓN: Si habla español, tiene a guallpa disposición servicios gratuitos de asistencia lingüística. Roque al [phone].    We comply with applicable federal civil rights laws and [place] laws. We do not discriminate on the basis of race, color, national origin, age, disability, sex, sexual orientation, or gender identity.            Thank you!     Thank you for choosing [facility]  for your care. Our goal is always to provide you with excellent care. Hearing back from our patients is one way we can continue to improve our services. Please take a few minutes to complete the written survey that you may receive in the mail after your visit with us. Thank you!             Your Updated Medication List - Protect others around you: Learn how to safely use, store and throw away your medicines at www.disposemymeds.org.          This list is accurate as of 6/19/18  5:29 PM.  Always use your most recent med list.                   Brand Name Dispense Instructions for use Diagnosis    levothyroxine 112 MCG tablet    SYNTHROID/LEVOTHROID    90 tablet     TAKE 1 TABLET (112 MCG) BY MOUTH DAILY    Hypothyroidism due to acquired atrophy of thyroid       phentermine 37.5 MG capsule   Start taking on:  7/24/2018     30 capsule    Take 1 capsule (37.5 mg) by mouth every morning    Class 1 obesity without serious comorbidity with body mass index (BMI) of 32.0 to 32.9 in adult, unspecified obesity type       PRENATABS FA Tabs      Take  by mouth.        VITAMIN D (CHOLECALCIFEROL) PO      Take 2,000 Units by mouth daily

## 2018-06-20 LAB — TSH SERPL DL<=0.005 MIU/L-ACNC: 0.94 MU/L (ref 0.4–4)

## 2018-08-16 ENCOUNTER — OFFICE VISIT (OUTPATIENT)
Dept: FAMILY MEDICINE | Facility: CLINIC | Age: 33
End: 2018-08-16
Payer: COMMERCIAL

## 2018-08-16 VITALS
OXYGEN SATURATION: 96 % | SYSTOLIC BLOOD PRESSURE: 112 MMHG | DIASTOLIC BLOOD PRESSURE: 74 MMHG | HEIGHT: 69 IN | RESPIRATION RATE: 16 BRPM | HEART RATE: 76 BPM | TEMPERATURE: 98.1 F | WEIGHT: 203.5 LBS | BODY MASS INDEX: 30.14 KG/M2

## 2018-08-16 DIAGNOSIS — E66.811 CLASS 1 OBESITY WITHOUT SERIOUS COMORBIDITY WITH BODY MASS INDEX (BMI) OF 32.0 TO 32.9 IN ADULT, UNSPECIFIED OBESITY TYPE: ICD-10-CM

## 2018-08-16 DIAGNOSIS — M53.3 PAIN IN THE COCCYX: ICD-10-CM

## 2018-08-16 DIAGNOSIS — N94.9 VAGINAL SYMPTOM: Primary | ICD-10-CM

## 2018-08-16 LAB
ALBUMIN UR-MCNC: NEGATIVE MG/DL
APPEARANCE UR: CLEAR
BILIRUB UR QL STRIP: NEGATIVE
COLOR UR AUTO: YELLOW
GLUCOSE UR STRIP-MCNC: NEGATIVE MG/DL
HGB UR QL STRIP: ABNORMAL
KETONES UR STRIP-MCNC: NEGATIVE MG/DL
LEUKOCYTE ESTERASE UR QL STRIP: NEGATIVE
NITRATE UR QL: NEGATIVE
PH UR STRIP: 7 PH (ref 5–7)
RBC #/AREA URNS AUTO: NORMAL /HPF
SOURCE: ABNORMAL
SP GR UR STRIP: 1.01 (ref 1–1.03)
SPECIMEN SOURCE: NORMAL
UROBILINOGEN UR STRIP-ACNC: 0.2 EU/DL (ref 0.2–1)
WBC #/AREA URNS AUTO: NORMAL /HPF
WET PREP SPEC: NORMAL

## 2018-08-16 PROCEDURE — 99214 OFFICE O/P EST MOD 30 MIN: CPT | Performed by: PHYSICIAN ASSISTANT

## 2018-08-16 PROCEDURE — 81001 URINALYSIS AUTO W/SCOPE: CPT | Performed by: PHYSICIAN ASSISTANT

## 2018-08-16 PROCEDURE — 87210 SMEAR WET MOUNT SALINE/INK: CPT | Performed by: PHYSICIAN ASSISTANT

## 2018-08-16 RX ORDER — PHENTERMINE HYDROCHLORIDE 37.5 MG/1
37.5 CAPSULE ORAL EVERY MORNING
Qty: 30 CAPSULE | Refills: 0 | Status: SHIPPED | OUTPATIENT
Start: 2018-09-23 | End: 2018-08-16

## 2018-08-16 RX ORDER — PHENTERMINE HYDROCHLORIDE 37.5 MG/1
37.5 CAPSULE ORAL EVERY MORNING
Qty: 30 CAPSULE | Refills: 0 | Status: SHIPPED | OUTPATIENT
Start: 2018-10-23 | End: 2019-04-19

## 2018-08-16 RX ORDER — PHENTERMINE HYDROCHLORIDE 37.5 MG/1
37.5 CAPSULE ORAL EVERY MORNING
Qty: 30 CAPSULE | Refills: 0 | Status: SHIPPED | OUTPATIENT
Start: 2018-08-23 | End: 2018-08-16

## 2018-08-16 NOTE — PROGRESS NOTES
SUBJECTIVE:   Bella Scott is a 32 year old female who presents to clinic today for the following health issues:      Medication Followup of phentermine    Taking Medication as prescribed: yes    Side Effects:  None     Medication Helping Symptoms:  yes     Wt Readings from Last 4 Encounters:   08/16/18 203 lb 8 oz (92.3 kg)   05/24/18 216 lb 4.8 oz (98.1 kg)   03/05/18 231 lb 3.2 oz (104.9 kg)   02/22/18 230 lb (104.3 kg)     Going well with phentermine, thinks she is noticing less of appetite suppression now.  Goal weight is 155lbs.  She would like to keep going with the Rx.      Vaginal Symptoms  Onset: Ongoing for Three to Four Weeks    Description:  Vaginal Discharge: white clear   Itching (Pruritis): no   Burning sensation:  no   Odor: no     Accompanying Signs & Symptoms:  Pain with Urination: no   Abdominal Pain: YES-More Pelvic   Fever: no     History:   Sexually active: YES  New Partner: no   Possibility of Pregnancy:  No    Precipitating factors:   Recent Antibiotic Use: no     Alleviating factors:  Ibuprofen was taken on a day the patient was having cramps and seemed to help for that one day.     Therapies Tried and outcome: See Above    Some blood noted in discharge today which has been more significant than usual lately.  Some cramping noted also.  Has IUD and usually does not get much of a period but every few months and then will spot some.  The blood has her concerned today.  No urinary symptoms.    Pt also has tailbone pain that is still persistent.   Still very painful, even after losing weight.  Saw neurosurgery and chiropractor since Feb and still having pain.  Has some concerns over bowel possibilities with these symptoms. Knows someone who had also tailbone pain and ended up being diagnosed with colon cancer. She does not have any changed in stool pattern but notes on and off constipation. Uses fiber gummies, tries to drink extra water for this.  She is very worried about this today.  Also  "very tired of having this pain.  It affects her daily, painful everytime when sitting.      Problem list and histories reviewed & adjusted, as indicated.  Additional history: as documented    Labs reviewed in EPIC    Reviewed and updated as needed this visit by clinical staff  Tobacco  Allergies  Meds  Med Hx  Surg Hx  Fam Hx  Soc Hx      Reviewed and updated as needed this visit by Provider         ROS:  Constitutional, HEENT, cardiovascular, pulmonary, gi and gu systems are negative, except as otherwise noted.    OBJECTIVE:     /74 (BP Location: Right arm, Patient Position: Chair, Cuff Size: Adult Large)  Pulse 76  Temp 98.1  F (36.7  C) (Oral)  Resp 16  Ht 5' 8.5\" (1.74 m)  Wt 203 lb 8 oz (92.3 kg)  SpO2 96%  BMI 30.49 kg/m2  Body mass index is 30.49 kg/(m^2).  GENERAL: healthy, alert and no distress  MS: spine exam shows that  the area of concern for her and tenderness is in fact at the extreme tip of the coccyx  SKIN: no suspicious lesions or rashes  PSYCH: mentation appears normal, affect flat, tearful and anxious    Diagnostic Test Results:  Results for orders placed or performed in visit on 08/16/18   *UA reflex to Microscopic and Culture ([place] (except [place] and [place])   Result Value Ref Range    Color Urine Yellow     Appearance Urine Clear     Glucose Urine Negative NEG^Negative mg/dL    Bilirubin Urine Negative NEG^Negative    Ketones Urine Negative NEG^Negative mg/dL    Specific Gravity Urine 1.010 1.003 - 1.035    Blood Urine Small (A) NEG^Negative    pH Urine 7.0 5.0 - 7.0 pH    Protein Albumin Urine Negative NEG^Negative mg/dL    Urobilinogen Urine 0.2 0.2 - 1.0 EU/dL    Nitrite Urine Negative NEG^Negative    Leukocyte Esterase Urine Negative NEG^Negative    Source Midstream Urine    Urine Microscopic   Result Value Ref Range    WBC Urine 0 - 5 OTO5^0 - 5 /HPF    RBC Urine O - 2 OTO2^O - 2 /HPF   Wet prep   Result Value Ref Range    Specimen Description " Vagina     Wet Prep No Trichomonas seen     Wet Prep No clue cells seen     Wet Prep No yeast seen        ASSESSMENT/PLAN:   1. Vaginal symptom  I discussed that I think the blood she saw was regular menstrual spotting.  Labs are normal today.  - Wet prep  - *UA reflex to Microscopic and Culture ([place] [facility] (except [place] and [place])  - Urine Microscopic    2. Pain in the coccyx  - reviewed her chart.  Discussed at length that the next recommended steps from neurosurgery were to follow up with them to discuss imaging and possible injections.  We discussed, again at length, a colonoscopy for this.  I told her I wouldn't deny her one but I thought that the neurology follow up first would be best.      3. Class 1 obesity without serious comorbidity with body mass index (BMI) of 32.0 to 32.9 in adult, unspecified obesity type  Refilled today.  She is doing well with this.  - phentermine 37.5 MG capsule; Take 1 capsule (37.5 mg) by mouth every morning  Dispense: 30 capsule; Refill: 0        Osmar Rendon PA-C  [facility]

## 2018-08-16 NOTE — MR AVS SNAPSHOT
After Visit Summary   8/16/2018    Bella Scott    MRN: [id number]           Patient Information     Date Of Birth          1985        Visit Information        Provider Department      8/16/2018 1:20 PM Osmar Rendon PA-C [facility]        Today's Diagnoses     Vaginal symptom    -  1    Pain in the coccyx        Class 1 obesity without serious comorbidity with body mass index (BMI) of 32.0 to 32.9 in adult, unspecified obesity type          Care Instructions    [facility]  [phone]          Follow-ups after your visit        Follow-up notes from your care team     Return in about 3 months (around 11/16/2018) for Med Check.      Who to contact     If you have questions or need follow up information about today's clinic visit or your schedule please contact [facility] directly at [phone].  Normal or non-critical lab and imaging results will be communicated to you by MyChart, letter or phone within 4 business days after the clinic has received the results. If you do not hear from us within 7 days, please contact the clinic through ERPLYhart or phone. If you have a critical or abnormal lab result, we will notify you by phone as soon as possible.  Submit refill requests through Colovore or call your pharmacy and they will forward the refill request to us. Please allow 3 business days for your refill to be completed.          Additional Information About Your Visit        MyChart Information     Colovore gives you secure access to your electronic health record. If you see a primary care provider, you can also send messages to your care team and make appointments. If you have questions, please call your primary care clinic.  If you do not have a primary care provider, please call [phone] and they will assist you.        Care EveryWhere ID     This is your Care EveryWhere ID. This could be used by other organizations to access your  "[place] medical records  [id number]        Your Vitals Were     Pulse Temperature Respirations Height Pulse Oximetry BMI (Body Mass Index)    76 98.1  F (36.7  C) (Oral) 16 5' 8.5\" (1.74 m) 96% 30.49 kg/m2       Blood Pressure from Last 3 Encounters:   08/16/18 112/74   05/24/18 110/74   03/05/18 120/76    Weight from Last 3 Encounters:   08/16/18 203 lb 8 oz (92.3 kg)   05/24/18 216 lb 4.8 oz (98.1 kg)   03/05/18 231 lb 3.2 oz (104.9 kg)              We Performed the Following     *UA reflex to Microscopic and Culture ([place] [facility] (except [place] and [place])     Urine Microscopic     Wet prep          Today's Medication Changes          These changes are accurate as of 8/16/18  2:18 PM.  If you have any questions, ask your nurse or doctor.               Start taking these medicines.        Dose/Directions    phentermine 37.5 MG capsule   Used for:  Class 1 obesity without serious comorbidity with body mass index (BMI) of 32.0 to 32.9 in adult, unspecified obesity type   Started by:  Osmar Rendon PA-C        Dose:  37.5 mg   Start taking on:  10/23/2018   Take 1 capsule (37.5 mg) by mouth every morning   Quantity:  30 capsule   Refills:  0            Where to get your medicines      Some of these will need a paper prescription and others can be bought over the counter.  Ask your nurse if you have questions.     Bring a paper prescription for each of these medications     phentermine 37.5 MG capsule                Primary Care Provider Office Phone # Fax #    Osmar Rendon PA-C [phone]       [id number]  [facility] [address]        Equal Access to Services     [facility] GERALD AH: Tina Raphael, waphucda luqadaha, qaybta kaalmada adeegyada, kaila de leon. So [facility] [phone].    ATENCIÓN: Si habla español, tiene a guallpa disposición servicios gratuitos de asistencia lingüística. Llame al [phone].    We comply with " applicable federal civil rights laws and [place] laws. We do not discriminate on the basis of race, color, national origin, age, disability, sex, sexual orientation, or gender identity.            Thank you!     Thank you for choosing [facility]  for your care. Our goal is always to provide you with excellent care. Hearing back from our patients is one way we can continue to improve our services. Please take a few minutes to complete the written survey that you may receive in the mail after your visit with us. Thank you!             Your Updated Medication List - Protect others around you: Learn how to safely use, store and throw away your medicines at www.disposemymeds.org.          This list is accurate as of 8/16/18  2:18 PM.  Always use your most recent med list.                   Brand Name Dispense Instructions for use Diagnosis    levothyroxine 112 MCG tablet    SYNTHROID/LEVOTHROID    90 tablet    TAKE 1 TABLET (112 MCG) BY MOUTH DAILY    Hypothyroidism due to acquired atrophy of thyroid       phentermine 37.5 MG capsule   Start taking on:  10/23/2018     30 capsule    Take 1 capsule (37.5 mg) by mouth every morning    Class 1 obesity without serious comorbidity with body mass index (BMI) of 32.0 to 32.9 in adult, unspecified obesity type       PRENATABS FA Tabs      Take  by mouth.        VITAMIN D (CHOLECALCIFEROL) PO      Take 2,000 Units by mouth daily

## 2018-12-10 NOTE — TELEPHONE ENCOUNTER
Attempt #3:    Called patient at [phone].  No answer - left message for patient to return call to clinic at [phone].      No return call received from patient - closing encounter. Gilda    No

## 2019-01-15 ENCOUNTER — OFFICE VISIT (OUTPATIENT)
Dept: FAMILY MEDICINE | Facility: CLINIC | Age: 34
End: 2019-01-15
Payer: COMMERCIAL

## 2019-01-15 VITALS
HEART RATE: 68 BPM | HEIGHT: 69 IN | DIASTOLIC BLOOD PRESSURE: 72 MMHG | BODY MASS INDEX: 29.33 KG/M2 | SYSTOLIC BLOOD PRESSURE: 118 MMHG | TEMPERATURE: 98.2 F | OXYGEN SATURATION: 95 % | WEIGHT: 198 LBS

## 2019-01-15 DIAGNOSIS — E03.4 HYPOTHYROIDISM DUE TO ACQUIRED ATROPHY OF THYROID: ICD-10-CM

## 2019-01-15 DIAGNOSIS — F32.0 MILD MAJOR DEPRESSION (H): Primary | ICD-10-CM

## 2019-01-15 DIAGNOSIS — E03.9 HYPOTHYROIDISM, UNSPECIFIED TYPE: ICD-10-CM

## 2019-01-15 PROCEDURE — 99214 OFFICE O/P EST MOD 30 MIN: CPT | Performed by: NURSE PRACTITIONER

## 2019-01-15 PROCEDURE — 36415 COLL VENOUS BLD VENIPUNCTURE: CPT | Performed by: NURSE PRACTITIONER

## 2019-01-15 PROCEDURE — 84443 ASSAY THYROID STIM HORMONE: CPT | Performed by: NURSE PRACTITIONER

## 2019-01-15 RX ORDER — FLUOXETINE 20 MG/1
20 TABLET, FILM COATED ORAL DAILY
Qty: 30 TABLET | Refills: 0 | Status: SHIPPED | OUTPATIENT
Start: 2019-01-15 | End: 2019-02-15

## 2019-01-15 ASSESSMENT — ANXIETY QUESTIONNAIRES
1. FEELING NERVOUS, ANXIOUS, OR ON EDGE: NOT AT ALL
GAD7 TOTAL SCORE: 5
2. NOT BEING ABLE TO STOP OR CONTROL WORRYING: NOT AT ALL
7. FEELING AFRAID AS IF SOMETHING AWFUL MIGHT HAPPEN: SEVERAL DAYS
5. BEING SO RESTLESS THAT IT IS HARD TO SIT STILL: NOT AT ALL
6. BECOMING EASILY ANNOYED OR IRRITABLE: NEARLY EVERY DAY
3. WORRYING TOO MUCH ABOUT DIFFERENT THINGS: SEVERAL DAYS
IF YOU CHECKED OFF ANY PROBLEMS ON THIS QUESTIONNAIRE, HOW DIFFICULT HAVE THESE PROBLEMS MADE IT FOR YOU TO DO YOUR WORK, TAKE CARE OF THINGS AT HOME, OR GET ALONG WITH OTHER PEOPLE: SOMEWHAT DIFFICULT

## 2019-01-15 ASSESSMENT — PATIENT HEALTH QUESTIONNAIRE - PHQ9
SUM OF ALL RESPONSES TO PHQ QUESTIONS 1-9: 11
5. POOR APPETITE OR OVEREATING: NOT AT ALL

## 2019-01-15 ASSESSMENT — MIFFLIN-ST. JEOR: SCORE: 1654.56

## 2019-01-15 NOTE — PROGRESS NOTES
"  SUBJECTIVE:   Bella Scott is a 33 year old female who presents to clinic today for the following health issues:      Depression Followup     Status since last visit: Worsened     See PHQ-9 for current symptoms.  Other associated symptoms: None    Complicating factors:   Significant life event:  No   Current substance abuse:  None  Anxiety or Panic symptoms:  Yes-  \"Postpartum, But is it's not bad\"    PHQ 4/16/2018 5/8/2018 1/15/2019   PHQ-9 Total Score 4 5 11   Q9: Suicide Ideation Not at all Not at all Not at all     In the past two weeks have you had thoughts of suicide or self-harm?  No.    Do you have concerns about your personal safety or the safety of others?   No  PHQ-9  English  PHQ-9   Any Language  Suicide Assessment Five-step Evaluation and Treatment (SAFE-T)    Tried meds in the past for depression.    Lexapro: made her more tired  Zoloft: felt tired  Contrave: recalls having light sensitivity; chart review mentions headaches and dizziness     Stopped phentermine in November.  Since then has been feeling sad, down, eating more, and no motivation.  Felt great on phentermine and wondering about going back on this.  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   Previously did therapy with Jeanne here in [place].  She didn't find this to be very helpful.     Needs thyroid medicine refilled.  Needs labs rechecked.  Dose has been stable.  Asymptomatic.   Wt Readings from Last 4 Encounters:   01/15/19 89.8 kg (198 lb)   08/16/18 92.3 kg (203 lb 8 oz)   05/24/18 98.1 kg (216 lb 4.8 oz)   03/05/18 104.9 kg (231 lb 3.2 oz)         Problem list and histories reviewed & adjusted, as indicated.  Additional history: as documented    Current Outpatient Medications   Medication Sig Dispense Refill     FLUoxetine 20 MG tablet Take 1 tablet (20 mg) by mouth daily 30 tablet 0     levothyroxine (SYNTHROID/LEVOTHROID) 112 MCG tablet TAKE 1 TABLET (112 MCG) BY MOUTH DAILY 90 tablet 3     Prenatal Vit-Fe Fumarate-FA (PRENATABS " "FA) TABS Take  by mouth.       VITAMIN D, CHOLECALCIFEROL, PO Take 2,000 Units by mouth daily       phentermine 37.5 MG capsule Take 1 capsule (37.5 mg) by mouth every morning (Patient not taking: Reported on 1/15/2019) 30 capsule 0     Allergies   Allergen Reactions     No Known Allergies        Reviewed and updated as needed this visit by clinical staff  Tobacco  Allergies  Med Hx  Surg Hx  Fam Hx  Soc Hx      Reviewed and updated as needed this visit by Provider         ROS:  Constitutional, HEENT, cardiovascular, pulmonary, gi and gu and psych systems are negative, except as otherwise noted.    OBJECTIVE:     /72 (BP Location: Right arm, Patient Position: Sitting, Cuff Size: Adult Regular)   Pulse 68   Temp 98.2  F (36.8  C) (Oral)   Ht 1.74 m (5' 8.5\")   Wt 89.8 kg (198 lb)   SpO2 95%   BMI 29.67 kg/m    Body mass index is 29.67 kg/m .  GENERAL: healthy, alert and no distress  PSYCH: mentation appears normal, affect normal/bright    Diagnostic Test Results:  none     ASSESSMENT/PLAN:   1. Hypothyroidism, unspecified type  Due for labs; will send in refills when labs are back.   - TSH with free T4 reflex    2. Mild major depression (H)  Can not use phentermine for mood.  Symptoms today are most likely related to uncontrolled depression.  Has failed a few medications in the past.  Will trial prozac.  May help with eating as well.  Will have her wean on to med slowly to try and avoid side effects since these have been problematic in the past; take a 1/2 tab x 1 week then start full tab.   Risks, benefits and side effects reviewed.  If fails med, consider wellbutrin though may have been cause of side effects with contrave or psych referral given several failed medications.   - FLUoxetine 20 MG tablet; Take 1 tablet (20 mg) by mouth daily  Dispense: 30 tablet; Refill: 0    F/u 3 wks    CECILIA Jones [facility]    "

## 2019-01-16 LAB — TSH SERPL DL<=0.005 MIU/L-ACNC: 2.48 MU/L (ref 0.4–4)

## 2019-01-16 RX ORDER — LEVOTHYROXINE SODIUM 112 UG/1
TABLET ORAL
Qty: 90 TABLET | Refills: 3 | Status: SHIPPED | OUTPATIENT
Start: 2019-01-16 | End: 2020-01-20

## 2019-01-16 ASSESSMENT — ANXIETY QUESTIONNAIRES: GAD7 TOTAL SCORE: 5

## 2019-02-07 DIAGNOSIS — F32.0 MILD MAJOR DEPRESSION (H): ICD-10-CM

## 2019-02-07 RX ORDER — FLUOXETINE 20 MG/1
20 TABLET, FILM COATED ORAL DAILY
Qty: 30 TABLET | Refills: 0 | Status: CANCELLED | OUTPATIENT
Start: 2019-02-07

## 2019-02-07 NOTE — TELEPHONE ENCOUNTER
NOTES FROM OFFICE VISIT ON 1/15/2019:  Has failed a few medications in the past.  Will trial prozac.  May help with eating as well.  Will have her wean on to med slowly to try and avoid side effects since these have been problematic in the past; take a 1/2 tab x 1 week then start full tab.      Instructions   Return in about 3 weeks (around 2/5/2019) for depression   _______________________________________________    Patient needs to be seen.    Spoke with patient and appointment scheduled for follow up.  Patient states that she has enough supply to make it until her appointment.    Jacque Montez RN

## 2019-02-07 NOTE — TELEPHONE ENCOUNTER
"Requested Prescriptions   Pending Prescriptions Disp Refills     FLUoxetine 20 MG tablet  PATIENT REQUESTING 90 DAY SUPPLY.  Last Written Prescription Date:  1/15/19  Last Fill Quantity: 30 TABLET,  # refills: 0   Last office visit: 1/15/2019 with prescribing provider:  STRONG   Future Office Visit:     30 tablet 0     Sig: Take 1 tablet (20 mg) by mouth daily    SSRIs Protocol Failed - 2/7/2019  9:11 AM       Failed - PHQ-9 score less than 5 in past 6 months    Please review last PHQ-9 score.   PHQ-9 SCORE 4/16/2018 5/8/2018 1/15/2019   PHQ-9 Total Score 4 5 11     DESIREE-7 SCORE 4/16/2018 5/8/2018 1/15/2019   Total Score 3 7 5                Passed - Medication is active on med list       Passed - Patient is age 18 or older       Passed - No active pregnancy on record       Passed - No positive pregnancy test in last 12 months       Passed - Recent (6 mo) or future (30 days) visit within the authorizing provider's specialty    Patient had office visit in the last 6 months or has a visit in the next 30 days with authorizing provider or within the authorizing provider's specialty.  See \"Patient Info\" tab in inbasket, or \"Choose Columns\" in Meds & Orders section of the refill encounter.              "

## 2019-02-12 ENCOUNTER — TELEPHONE (OUTPATIENT)
Dept: FAMILY MEDICINE | Facility: CLINIC | Age: 34
End: 2019-02-12

## 2019-02-12 NOTE — TELEPHONE ENCOUNTER
Panel Management Review      Patient has the following on her problem list:     Depression / Dysthymia review    Measure:  Needs PHQ-9 score of 4 or less during index window.  Administer PHQ-9 and if score is 5 or more, send encounter to provider for next steps.    5 - 7 month window range:     PHQ-9 SCORE 4/16/2018 5/8/2018 1/15/2019   PHQ-9 Total Score 4 5 11       If PHQ-9 recheck is 5 or more, route to provider for next steps.    Patient is due for:  PHQ9      Composite cancer screening  Chart review shows that this patient is due/due soon for the following Pap Smear  Summary:    Patient is due/failing the following:   PAP, PHQ9 and PHYSICAL    Action needed:   Patient needs office visit for physical with pap. and Patient needs to do PHQ9.    Type of outreach:    Sent ComputeNext message.    Questions for provider review:    None                                                                                                                                    Tova Preciado MA       Chart routed to Care Team .

## 2019-02-15 ENCOUNTER — OFFICE VISIT (OUTPATIENT)
Dept: FAMILY MEDICINE | Facility: CLINIC | Age: 34
End: 2019-02-15
Payer: COMMERCIAL

## 2019-02-15 VITALS
BODY MASS INDEX: 29.22 KG/M2 | DIASTOLIC BLOOD PRESSURE: 70 MMHG | HEART RATE: 63 BPM | TEMPERATURE: 98.1 F | RESPIRATION RATE: 16 BRPM | SYSTOLIC BLOOD PRESSURE: 104 MMHG | OXYGEN SATURATION: 98 % | WEIGHT: 195 LBS

## 2019-02-15 DIAGNOSIS — F32.0 MILD MAJOR DEPRESSION (H): ICD-10-CM

## 2019-02-15 DIAGNOSIS — Z12.4 SCREENING FOR MALIGNANT NEOPLASM OF CERVIX: ICD-10-CM

## 2019-02-15 PROCEDURE — 99214 OFFICE O/P EST MOD 30 MIN: CPT | Performed by: NURSE PRACTITIONER

## 2019-02-15 RX ORDER — FLUOXETINE 20 MG/1
20 TABLET, FILM COATED ORAL DAILY
Qty: 30 TABLET | Refills: 0 | Status: CANCELLED | OUTPATIENT
Start: 2019-02-15

## 2019-02-15 RX ORDER — FLUOXETINE 10 MG/1
20 TABLET, FILM COATED ORAL DAILY
Qty: 60 TABLET | Refills: 0 | Status: SHIPPED | OUTPATIENT
Start: 2019-02-15 | End: 2019-04-19

## 2019-02-15 ASSESSMENT — PATIENT HEALTH QUESTIONNAIRE - PHQ9
5. POOR APPETITE OR OVEREATING: NOT AT ALL
SUM OF ALL RESPONSES TO PHQ QUESTIONS 1-9: 5

## 2019-02-15 ASSESSMENT — ANXIETY QUESTIONNAIRES
5. BEING SO RESTLESS THAT IT IS HARD TO SIT STILL: NOT AT ALL
IF YOU CHECKED OFF ANY PROBLEMS ON THIS QUESTIONNAIRE, HOW DIFFICULT HAVE THESE PROBLEMS MADE IT FOR YOU TO DO YOUR WORK, TAKE CARE OF THINGS AT HOME, OR GET ALONG WITH OTHER PEOPLE: NOT DIFFICULT AT ALL
1. FEELING NERVOUS, ANXIOUS, OR ON EDGE: NOT AT ALL
2. NOT BEING ABLE TO STOP OR CONTROL WORRYING: NOT AT ALL
GAD7 TOTAL SCORE: 1
7. FEELING AFRAID AS IF SOMETHING AWFUL MIGHT HAPPEN: NOT AT ALL
6. BECOMING EASILY ANNOYED OR IRRITABLE: SEVERAL DAYS
3. WORRYING TOO MUCH ABOUT DIFFERENT THINGS: NOT AT ALL

## 2019-02-15 NOTE — PROGRESS NOTES
"  SUBJECTIVE:   Bella Scott is a 33 year old female who presents to clinic today for the following health issues:      Medication Followup of Prozac      Taking Medication as prescribed: yes    Side Effects:  None    Medication Helping Symptoms:  yes       PHQ-9 SCORE 5/8/2018 1/15/2019 2/15/2019   PHQ-9 Total Score 5 11 5     DESIREE-7 SCORE 5/8/2018 1/15/2019 2/15/2019   Total Score 7 5 1     Taking at night and wondering if would be better if she takes in the morning.  She is feeling tired most days and had similar side effect to other SSRIs.  Feels like she wants to nap every day.  Overall mood/anxiety feel better, but has feeling \"slight zombie\".  Had upset stomach, nausea when she first started medication lasted for about 2 weeks, now resolved.  Doesn't feel she has anxiety.  Has some worry about her kids, but believes it to be normal worry that every parent has about their children.       Problem list and histories reviewed & adjusted, as indicated.  Additional history: as documented    Current Outpatient Medications   Medication Sig Dispense Refill     FLUoxetine (PROZAC) 10 MG tablet Take 2 tablets (20 mg) by mouth daily 60 tablet 0     levothyroxine (SYNTHROID/LEVOTHROID) 112 MCG tablet TAKE 1 TABLET (112 MCG) BY MOUTH DAILY 90 tablet 3     Prenatal Vit-Fe Fumarate-FA (PRENATABS FA) TABS Take  by mouth.       VITAMIN D, CHOLECALCIFEROL, PO Take 2,000 Units by mouth daily       phentermine 37.5 MG capsule Take 1 capsule (37.5 mg) by mouth every morning (Patient not taking: Reported on 1/15/2019) 30 capsule 0     Allergies   Allergen Reactions     No Known Allergies        Reviewed and updated as needed this visit by clinical staff  Tobacco  Allergies  Meds  Med Hx  Surg Hx  Fam Hx  Soc Hx      Reviewed and updated as needed this visit by Provider         ROS:  Constitutional, HEENT, cardiovascular, pulmonary, gi and gu and psych systems are negative, except as otherwise noted.    OBJECTIVE:     BP " 104/70 (BP Location: Right arm, Patient Position: Sitting, Cuff Size: Adult Regular)   Pulse 63   Temp 98.1  F (36.7  C) (Oral)   Resp 16   Wt 88.5 kg (195 lb)   SpO2 98%   BMI 29.22 kg/m    Body mass index is 29.22 kg/m .  GENERAL: healthy, alert and no distress  PSYCH: mentation appears normal, affect normal/bright    Diagnostic Test Results:  none     ASSESSMENT/PLAN:   1. Screening for malignant neoplasm of cervix  Due; has scheduled at [facility] next [facility].  Signed GLORY.     2. Mild major depression (H)  Improved, but having mild side effect (fatigue).  Will continue at the same dose (20 mg) however if fatigue becomes too bothersome will decrease to 1.5 tabs (15 mg).  If continues to be bothersome will switch to wellbutrin.    - FLUoxetine (PROZAC) 10 MG tablet; Take 2 tablets (20 mg) by mouth daily  Dispense: 60 tablet; Refill: 0    F/u 1 month; can do phone or evisit    CECILIA Jones [facility]

## 2019-02-15 NOTE — TELEPHONE ENCOUNTER
"Requested Prescriptions   Pending Prescriptions Disp Refills     FLUoxetine 20 MG tablet 30 tablet 0    Last Written Prescription Date:  01/15/2019  Last Fill Quantity: 30 tablet,  # refills: 0   Last Office Visit: 01/15/2019 Strong  Future Office Visit:      Sig: Take 1 tablet (20 mg) by mouth daily    SSRIs Protocol Failed - 2/15/2019 10:41 AM       Failed - PHQ-9 score less than 5 in past 6 months    PHQ-9 SCORE 4/16/2018 5/8/2018 1/15/2019   PHQ-9 Total Score 4 5 11     DESIREE-7 SCORE 4/16/2018 5/8/2018 1/15/2019   Total Score 3 7 5            Passed - Medication is active on med list       Passed - Patient is age 18 or older       Passed - No active pregnancy on record       Passed - No positive pregnancy test in last 12 months       Passed - Recent (6 mo) or future (30 days) visit within the authorizing provider's specialty    Patient had office visit in the last 6 months or has a visit in the next 30 days with authorizing provider or within the authorizing provider's specialty.  See \"Patient Info\" tab in inbasket, or \"Choose Columns\" in Meds & Orders section of the refill encounter.            "

## 2019-02-16 ASSESSMENT — ANXIETY QUESTIONNAIRES: GAD7 TOTAL SCORE: 1

## 2019-02-19 ENCOUNTER — MYC MEDICAL ADVICE (OUTPATIENT)
Dept: FAMILY MEDICINE | Facility: CLINIC | Age: 34
End: 2019-02-19

## 2019-02-19 DIAGNOSIS — F32.0 MILD MAJOR DEPRESSION (H): Primary | ICD-10-CM

## 2019-02-20 RX ORDER — BUPROPION HYDROCHLORIDE 150 MG/1
150 TABLET ORAL EVERY MORNING
Qty: 30 TABLET | Refills: 1 | Status: SHIPPED | OUTPATIENT
Start: 2019-02-20 | End: 2019-05-10

## 2019-02-25 DIAGNOSIS — F32.0 MILD MAJOR DEPRESSION (H): ICD-10-CM

## 2019-02-25 NOTE — TELEPHONE ENCOUNTER
"Requested Prescriptions   Pending Prescriptions Disp Refills     FLUoxetine (PROZAC) 10 MG tablet  PATIENT REQUESTING 90 DAY SUPPLY.  Last Written Prescription Date:  2/15/19  Last Fill Quantity: 60 TABLET,  # refills: 0   Last office visit: 2/15/2019 with prescribing provider:  STRONG   Future Office Visit:     60 tablet 0     Sig: Take 2 tablets (20 mg) by mouth daily    SSRIs Protocol Failed - 2/25/2019  2:11 PM       Failed - PHQ-9 score less than 5 in past 6 months    Please review last PHQ-9 score.   PHQ-9 SCORE 5/8/2018 1/15/2019 2/15/2019   PHQ-9 Total Score 5 11 5     DESIREE-7 SCORE 5/8/2018 1/15/2019 2/15/2019   Total Score 7 5 1                Passed - Medication is active on med list       Passed - Patient is age 18 or older       Passed - No active pregnancy on record       Passed - No positive pregnancy test in last 12 months       Passed - Recent (6 mo) or future (30 days) visit within the authorizing provider's specialty    Patient had office visit in the last 6 months or has a visit in the next 30 days with authorizing provider or within the authorizing provider's specialty.  See \"Patient Info\" tab in inbasket, or \"Choose Columns\" in Meds & Orders section of the refill encounter.              "

## 2019-02-27 ENCOUNTER — OFFICE VISIT (OUTPATIENT)
Dept: NEUROSURGERY | Facility: CLINIC | Age: 34
End: 2019-02-27
Attending: NURSE PRACTITIONER
Payer: COMMERCIAL

## 2019-02-27 VITALS
WEIGHT: 195 LBS | HEIGHT: 69 IN | BODY MASS INDEX: 28.88 KG/M2 | DIASTOLIC BLOOD PRESSURE: 75 MMHG | OXYGEN SATURATION: 98 % | SYSTOLIC BLOOD PRESSURE: 115 MMHG | HEART RATE: 86 BPM

## 2019-02-27 DIAGNOSIS — M54.16 LUMBAR RADICULAR PAIN: Primary | ICD-10-CM

## 2019-02-27 PROCEDURE — 99214 OFFICE O/P EST MOD 30 MIN: CPT | Performed by: NURSE PRACTITIONER

## 2019-02-27 PROCEDURE — G0463 HOSPITAL OUTPT CLINIC VISIT: HCPCS

## 2019-02-27 ASSESSMENT — PAIN SCALES - GENERAL: PAINLEVEL: NO PAIN (1)

## 2019-02-27 ASSESSMENT — MIFFLIN-ST. JEOR: SCORE: 1640.95

## 2019-02-27 NOTE — LETTER
"    2/27/2019         RE: Bella Scott  [address]  [facility] [address]        Dear Colleague,    Thank you for referring your patient, Bella Scott, to the [facility]. Please see a copy of my visit note below.    [facility]  Neurosurgery followup:    HPI: Bella Scott is a 33 year old female that tailbone pain. She was seen about 1 year ago. She has lost 40 pounds and seen Dr. Cande Adkins  Which only helped slightly. She states besides the ongoing tailbone pain she has had severe back pain with radicular pain down her left lateral thigh to the ankle. She states that at times her legs just feel \"achy\". She denies any injury or incident. She also intermittent achyness to her arms as well. She denies any weakness.  She currently works as a NICU nurse.         Exam:  Constitutional:  Alert, well nourished, NAD.  HEENT: Normocephalic, atraumatic.   Pulm:  Without shortness of breath   CV:  No pitting edema of BLE.      Neurological:  Awake  Alert  Oriented x 3  Motor exam:        IP Q DF PF EHL  R   5  5   5   5    5  L   5  5   5   5    5     Able to spontaneously move L/E bilaterally  Sensation intact throughout all L/E dermatomes     Lumbar pain with ROM.  Pain with palpation to the coccyx region.     A/P: Bella Scott is a 33 year old female that tailbone pain. She was seen about 1 year ago. She has lost 40 pounds and seen Dr. Cande Adkins  Which only helped slightly. She states besides the ongoing tailbone pain she has had severe back pain with radicular pain down her left lateral thigh to the ankle. She states that at times her legs just feel \"achy\". She denies any injury or incident. She also intermittent achyness to her arms as well. She denies any weakness.  She currently works as a NICU nurse.   The pt is neurologically intact with coccyx pain.  Due to her increase in her lumbar pain and radicular pain we will have her undergo a lumbar MRI. "     Patient Instructions   1. Please call [facility] to have lumbar MRI completed. Call [phone] to schedule your scan.  I will contact you with results.           Chelsea Hi CNP  [facility]  [address] [address]    Tel [phone]  Pager [phone]        Again, thank you for allowing me to participate in the care of your patient.        Sincerely,        CECILIA Goncalves CNP

## 2019-02-27 NOTE — PATIENT INSTRUCTIONS
1. Please call [facility] to have lumbar MRI completed. Call [phone] to schedule your scan.  I will contact you with results.

## 2019-02-27 NOTE — PROGRESS NOTES
"[facility]  Neurosurgery followup:    HPI: Bella Scott is a 33 year old female that tailbone pain. She was seen about 1 year ago. She has lost 40 pounds and seen Dr. Cande Adkins  Which only helped slightly. She states besides the ongoing tailbone pain she has had severe back pain with radicular pain down her left lateral thigh to the ankle. She states that at times her legs just feel \"achy\". She denies any injury or incident. She also intermittent achyness to her arms as well. She denies any weakness.  She currently works as a NICU nurse.         Exam:  Constitutional:  Alert, well nourished, NAD.  HEENT: Normocephalic, atraumatic.   Pulm:  Without shortness of breath   CV:  No pitting edema of BLE.      Neurological:  Awake  Alert  Oriented x 3  Motor exam:        IP Q DF PF EHL  R   5  5   5   5    5  L   5  5   5   5    5     Able to spontaneously move L/E bilaterally  Sensation intact throughout all L/E dermatomes     Lumbar pain with ROM.  Pain with palpation to the coccyx region.     A/P: Bella Scott is a 33 year old female that tailbone pain. She was seen about 1 year ago. She has lost 40 pounds and seen Dr. Cande Adkins  Which only helped slightly. She states besides the ongoing tailbone pain she has had severe back pain with radicular pain down her left lateral thigh to the ankle. She states that at times her legs just feel \"achy\". She denies any injury or incident. She also intermittent achyness to her arms as well. She denies any weakness.  She currently works as a NICU nurse.  The pt is neurologically intact with coccyx pain.  Due to her increase in her lumbar pain and radicular pain we will have her undergo a lumbar MRI.     Patient Instructions   1. Please call [facility] to have lumbar MRI completed. Call [phone] to schedule your scan.  I will contact you with results.           Chelsea Hi [facility]  Spine [facility] Clinic  [facility] " [address]  [address]  [place] [address]    Tel [phone]  Pager [phone]

## 2019-02-27 NOTE — NURSING NOTE
"Bella Scott is a 33 year old female who presents for:  Chief Complaint   Patient presents with     Neurologic Problem     Tailbone pain follow up         Vitals:    Vitals:    02/27/19 0945   BP: 115/75   BP Location: Right arm   Patient Position: Sitting   Cuff Size: Adult Regular   Pulse: 86   SpO2: 98%   Weight: 195 lb (88.5 kg)   Height: 5' 8.5\" (1.74 m)       BMI:  Estimated body mass index is 29.22 kg/m  as calculated from the following:    Height as of this encounter: 5' 8.5\" (1.74 m).    Weight as of this encounter: 195 lb (88.5 kg).    Pain Score:  No Pain (1)      Do you feel safe in your environment?  Yes      Josselin Phipps"

## 2019-02-28 ENCOUNTER — MYC MEDICAL ADVICE (OUTPATIENT)
Dept: FAMILY MEDICINE | Facility: CLINIC | Age: 34
End: 2019-02-28

## 2019-02-28 ENCOUNTER — HOSPITAL ENCOUNTER (OUTPATIENT)
Dept: MRI IMAGING | Facility: CLINIC | Age: 34
Discharge: HOME | End: 2019-02-28
Attending: NURSE PRACTITIONER | Admitting: NURSE PRACTITIONER
Payer: COMMERCIAL

## 2019-02-28 DIAGNOSIS — M54.16 LUMBAR RADICULAR PAIN: ICD-10-CM

## 2019-02-28 PROCEDURE — 72148 MRI LUMBAR SPINE W/O DYE: CPT

## 2019-02-28 NOTE — TELEPHONE ENCOUNTER
Return in about 4 weeks (around 3/15/2019) for Mental Health Follow up.     jiffstore message sent to schedule appt for March  Not due for refill until March 15    Amparo Turcios RN, BS  Clinical Nurse Triage.

## 2019-03-01 ENCOUNTER — TELEPHONE (OUTPATIENT)
Dept: NEUROSURGERY | Facility: CLINIC | Age: 34
End: 2019-03-01

## 2019-03-01 DIAGNOSIS — M54.16 LUMBAR RADICULAR PAIN: Primary | ICD-10-CM

## 2019-03-01 RX ORDER — FLUOXETINE 10 MG/1
20 TABLET, FILM COATED ORAL DAILY
Qty: 60 TABLET | Refills: 0
Start: 2019-03-01

## 2019-03-01 NOTE — TELEPHONE ENCOUNTER
Pts lumbar MRI shows L5-S1 disc bulge and small left disc protrusion.      Recc. Injections and PT. No surgery indicated.     LM for call back.

## 2019-03-04 NOTE — TELEPHONE ENCOUNTER
Patient returned call for MRI results went over below message per Chelsea ADAMS. Patient states she has tried PT and chiro in the last year without much benefit. Patient is open to possibly trying an injection but would like to have a consultation first regarding injection. Informed patient that I could place an order for pain consult instead of just an injection. Patient would like to proceed with pain consult with [facility].

## 2019-03-26 DIAGNOSIS — F32.0 MILD MAJOR DEPRESSION (H): ICD-10-CM

## 2019-03-26 NOTE — TELEPHONE ENCOUNTER
"Pharm is requesting a 90 day supply.    Requested Prescriptions   Pending Prescriptions Disp Refills     buPROPion (WELLBUTRIN XL) 150 MG 24 hr tablet 30 tablet 1    Last Written Prescription Date:  2/20/19  Last Fill Quantity: 30,  # refills: 1   Last Office Visit: 2/15/2019 [facility]      Return in about 4 weeks (around 3/15/2019) for Mental Health Follow up.     Future Office Visit:      Sig: Take 1 tablet (150 mg) by mouth every morning    SSRIs Protocol Failed - 3/26/2019  1:19 PM       Failed - PHQ-9 score less than 5 in past 6 months    PHQ-9 SCORE 5/8/2018 1/15/2019 2/15/2019   PHQ-9 Total Score 5 11 5     DESIREE-7 SCORE 5/8/2018 1/15/2019 2/15/2019   Total Score 7 5 1              Passed - Medication is Bupropion    If the medication is Bupropion (Wellbutrin), and the patient is taking for smoking cessation; OK to refill.         Passed - Medication is active on med list       Passed - Patient is age 18 or older       Passed - No active pregnancy on record       Passed - No positive pregnancy test in last 12 months       Passed - Recent (6 mo) or future (30 days) visit within the authorizing provider's specialty    Patient had office visit in the last 6 months or has a visit in the next 30 days with authorizing provider or within the authorizing provider's specialty.  See \"Patient Info\" tab in inbasket, or \"Choose Columns\" in Meds & Orders section of the refill encounter.              "

## 2019-03-26 NOTE — TELEPHONE ENCOUNTER
Patient due for follow up appointment.    Left a detailed message on patients voice mail to call the clinic back and schedule an appointment.    Jacque Montez RN

## 2019-04-02 RX ORDER — BUPROPION HYDROCHLORIDE 150 MG/1
150 TABLET ORAL EVERY MORNING
Qty: 30 TABLET | Refills: 1 | OUTPATIENT
Start: 2019-04-02

## 2019-04-02 NOTE — TELEPHONE ENCOUNTER
"Pt states she only took the Wellbutrin for 30 days and \"then I just stopped taking them I am not really sure why\"     She states she does still have the 30 tab for the RF but has not started them.      She would like to see how she does with nothing for now and call back if she feels she needs to start back on something.  Pt states \"I feel a little tired and cranky but otherwise I am ok\"    She declined at this time to update PHQ     RX denied to pharmacy and call routed to provider for review.     Anny Jaramillo RN    "

## 2019-04-19 ENCOUNTER — OFFICE VISIT (OUTPATIENT)
Dept: FAMILY MEDICINE | Facility: CLINIC | Age: 34
End: 2019-04-19
Payer: COMMERCIAL

## 2019-04-19 VITALS
SYSTOLIC BLOOD PRESSURE: 108 MMHG | BODY MASS INDEX: 30.89 KG/M2 | HEIGHT: 68 IN | HEART RATE: 64 BPM | WEIGHT: 203.8 LBS | TEMPERATURE: 98.4 F | DIASTOLIC BLOOD PRESSURE: 70 MMHG

## 2019-04-19 DIAGNOSIS — F50.819 BINGE EATING DISORDER: Primary | ICD-10-CM

## 2019-04-19 DIAGNOSIS — F32.0 MILD MAJOR DEPRESSION (H): ICD-10-CM

## 2019-04-19 DIAGNOSIS — E55.9 VITAMIN D DEFICIENCY: ICD-10-CM

## 2019-04-19 DIAGNOSIS — R53.83 FATIGUE, UNSPECIFIED TYPE: ICD-10-CM

## 2019-04-19 PROCEDURE — 82306 VITAMIN D 25 HYDROXY: CPT | Performed by: PHYSICIAN ASSISTANT

## 2019-04-19 PROCEDURE — 36415 COLL VENOUS BLD VENIPUNCTURE: CPT | Performed by: PHYSICIAN ASSISTANT

## 2019-04-19 PROCEDURE — 99214 OFFICE O/P EST MOD 30 MIN: CPT | Performed by: PHYSICIAN ASSISTANT

## 2019-04-19 RX ORDER — NALTREXONE HYDROCHLORIDE 50 MG/1
50 TABLET, FILM COATED ORAL DAILY
Qty: 30 TABLET | Refills: 1 | Status: SHIPPED | OUTPATIENT
Start: 2019-04-19 | End: 2019-07-24

## 2019-04-19 ASSESSMENT — ANXIETY QUESTIONNAIRES
GAD7 TOTAL SCORE: 6
6. BECOMING EASILY ANNOYED OR IRRITABLE: NEARLY EVERY DAY
2. NOT BEING ABLE TO STOP OR CONTROL WORRYING: SEVERAL DAYS
7. FEELING AFRAID AS IF SOMETHING AWFUL MIGHT HAPPEN: SEVERAL DAYS
1. FEELING NERVOUS, ANXIOUS, OR ON EDGE: NOT AT ALL
3. WORRYING TOO MUCH ABOUT DIFFERENT THINGS: SEVERAL DAYS
5. BEING SO RESTLESS THAT IT IS HARD TO SIT STILL: NOT AT ALL
IF YOU CHECKED OFF ANY PROBLEMS ON THIS QUESTIONNAIRE, HOW DIFFICULT HAVE THESE PROBLEMS MADE IT FOR YOU TO DO YOUR WORK, TAKE CARE OF THINGS AT HOME, OR GET ALONG WITH OTHER PEOPLE: SOMEWHAT DIFFICULT

## 2019-04-19 ASSESSMENT — PATIENT HEALTH QUESTIONNAIRE - PHQ9
SUM OF ALL RESPONSES TO PHQ QUESTIONS 1-9: 11
10. IF YOU CHECKED OFF ANY PROBLEMS, HOW DIFFICULT HAVE THESE PROBLEMS MADE IT FOR YOU TO DO YOUR WORK, TAKE CARE OF THINGS AT HOME, OR GET ALONG WITH OTHER PEOPLE: VERY DIFFICULT
SUM OF ALL RESPONSES TO PHQ QUESTIONS 1-9: 11
5. POOR APPETITE OR OVEREATING: NOT AT ALL

## 2019-04-19 ASSESSMENT — MIFFLIN-ST. JEOR: SCORE: 1672.93

## 2019-04-19 NOTE — PROGRESS NOTES
"  SUBJECTIVE:   Bella Scott is a 33 year old female who presents to clinic today for the following health issues:      History of Present Illness     Depression & Anxiety Follow-up:     Depression/Anxiety:  Depression & Anxiety    Status since last visit::  Worsened    Other associated symptoms of depression and anxiety::  None    Significant life event::  No    Current substance use::  None       Today's PHQ-9         PHQ-9 Total Score:     (P) 11   PHQ-9 Q9 Thoughts of better off dead/self-harm past 2 weeks :   (P) Not at all   Thoughts of suicide or self harm:      Self-harm Plan:        Self-harm Action:          Safety concerns for self or others:            Hypothyroidism:     Since last visit, patient describes the following symptoms::  Anxiety, Depression, Dry skin, Fatigue and Weight gain    Weight gain::  5 lbs.    Diet:  Regular (no restrictions)  Frequency of exercise:  1 day/week  Duration of exercise:  15-30 minutes  Taking medications regularly:  Yes  Medication side effects:  Other  Additional concerns today:  Yes    Saw Pat Hahn in Jan and tried Prozac but stopped after a month due to zombie effects.  Then tried Wellbutrin via MyChart request.  Then ran out and simply stopped using.  Wanted to see if she could just \"not be on anything\".  Feels that most things have given her some sort of side effects.  She did now restart Wellbutrin 5 days ago.  This is because of feeling exhausted, cranky, overwhelmed.    Wellbutrin- not tired but ______?  Prozac- zombie like effects  Lexapro- even more tired  Zoloft- 6 months, too tired    Contrave- had side effects, only took for 10 days.  Phentermine was best, felt good on this, lost weight and had energy.    Weight back up, was at 187 at one point.    Took 6 weeks of 50,000 unit(s) Vitamin D.      Additional history: as documented    Reviewed and updated as needed this visit by clinical staff         Reviewed and updated as needed this visit by " "Provider         Labs reviewed in EPIC    ROS:  Constitutional, HEENT, cardiovascular, pulmonary, gi and gu systems are negative, except as otherwise noted.    OBJECTIVE:     /70 (BP Location: Right arm, Patient Position: Sitting, Cuff Size: Adult Regular)   Pulse 64   Temp 98.4  F (36.9  C) (Oral)   Ht 1.727 m (5' 8\")   Wt 92.4 kg (203 lb 12.8 oz)   LMP 04/18/2019   Breastfeeding? No   BMI 30.99 kg/m    Body mass index is 30.99 kg/m .  GENERAL: healthy, alert and no distress  RESP: lungs clear to auscultation - no rales, rhonchi or wheezes  CV: regular rate and rhythm, normal S1 S2, no S3 or S4, no murmur, click or rub, no peripheral edema and peripheral pulses strong  MS: no gross musculoskeletal defects noted, no edema  SKIN: no suspicious lesions or rashes  PSYCH: mentation appears normal, affect normal/bright and anxious    Diagnostic Test Results:  Results for orders placed or performed in visit on 04/19/19   Vitamin D Deficiency   Result Value Ref Range    Vitamin D Deficiency screening 30 20 - 75 ug/L       ASSESSMENT/PLAN:   1. Binge eating disorder  She was agreeable to adding naltrexone to Wellbutrin Rx for now.  We did discuss adding something like Ritalin/Vyvanse in the future if needed.  Insurance can be difficult to work around at times.  Adding this one can have benefit for depression however.  Will plan for 1 month follow up.  Mental Health referral as well.  - naltrexone (DEPADE/REVIA) 50 MG tablet; Take 1 tablet (50 mg) by mouth daily  Dispense: 30 tablet; Refill: 1  - MENTAL HEALTH REFERRAL  - Adult; Outpatient Treatment; Individual/Couples/Family/Group Therapy/Health Psychology; Other: [facility] [phone]; We will contact you to schedule the appointment or please call with any questi...    2. Mild major depression (H)  See above.  - MENTAL HEALTH REFERRAL  - Adult; Outpatient Treatment; Individual/Couples/Family/Group Therapy/Health Psychology; Other: " [facility] [phone]; We will contact you to schedule the appointment or please call with any questi...    3. Vitamin D deficiency  Check labs.  - Vitamin D Deficiency    4. Fatigue, unspecified type  - see above, check vit D level.  Continue with Wellbutrin, could increase dose if needed.          Osmar Rendon PA-C  [facility]

## 2019-04-20 ASSESSMENT — ANXIETY QUESTIONNAIRES: GAD7 TOTAL SCORE: 6

## 2019-04-22 LAB — DEPRECATED CALCIDIOL+CALCIFEROL SERPL-MC: 30 UG/L (ref 20–75)

## 2019-05-10 DIAGNOSIS — F32.0 MILD MAJOR DEPRESSION (H): ICD-10-CM

## 2019-05-10 NOTE — TELEPHONE ENCOUNTER
"Requested Prescriptions   Pending Prescriptions Disp Refills     buPROPion (WELLBUTRIN XL) 150 MG 24 hr tablet [Pharmacy Med Name: BUPROPION HCL  MG TABLET] 30 tablet 1     Sig: TAKE 1 TABLET (150 MG) BY MOUTH EVERY MORNING   Last Written Prescription Date:  2/20/19  Last Fill Quantity: 30,  # refills: 1   Last Office Visit: 4/19/2019 [facility]      Return in about 1 month (around 5/19/2019) for Follow up- phone visit.     Future Office Visit:    Next 5 appointments (look out 90 days)    May 17, 2019 10:40 AM CDT  Telephone Visit with Osmar Rendon PA-C  [facility] [address] [place] [address], [address]  [facility] [address] [phone]             SSRIs Protocol Failed - 5/10/2019  1:20 AM        Failed - PHQ-9 score less than 5 in past 6 months     PHQ-9 SCORE 2/15/2019 4/19/2019 4/19/2019   PHQ-9 Total Score MyChart - - 11 (Moderate depression)   PHQ-9 Total Score 5 11 12     DESIREE-7 SCORE 1/15/2019 2/15/2019 4/19/2019   Total Score 5 1 6               Passed - Medication is Bupropion     If the medication is Bupropion (Wellbutrin), and the patient is taking for smoking cessation; OK to refill.          Passed - Medication is active on med list        Passed - Patient is age 18 or older        Passed - No active pregnancy on record        Passed - No positive pregnancy test in last 12 months        Passed - Recent (6 mo) or future (30 days) visit within the authorizing provider's specialty     Patient had office visit in the last 6 months or has a visit in the next 30 days with authorizing provider or within the authorizing provider's specialty.  See \"Patient Info\" tab in inbasket, or \"Choose Columns\" in Meds & Orders section of the refill encounter.            "

## 2019-05-13 ENCOUNTER — FCC EXTENDED DOCUMENTATION (OUTPATIENT)
Dept: PSYCHOLOGY | Facility: CLINIC | Age: 34
End: 2019-05-13

## 2019-05-13 RX ORDER — BUPROPION HYDROCHLORIDE 150 MG/1
150 TABLET ORAL EVERY MORNING
Qty: 30 TABLET | Refills: 0 | Status: SHIPPED | OUTPATIENT
Start: 2019-05-13 | End: 2019-05-14

## 2019-05-13 NOTE — PROGRESS NOTES
Discharge Summary  Multiple Sessions    Client Name: Bella Scott MRN#: [id number] YOB: 1985      Intake / Discharge Date: 2-06-18  /  5-13-19      DSM5 Diagnoses: (Sustained by DSM5 Criteria Listed Above)  Diagnoses:MDD  Psychosocial & Contextual Factors: family  WHODAS 2.0 (12 item) Score: not avail          Presenting Concern:  Mood, stress      Reason for Discharge:  Client is satisfied with progress      Disposition at Time of Last Encounter:   Comments:   stable     Risk Management:   Client denies a history of suicidal ideation, suicide attempts, self-injurious behavior, homicidal ideation, homicidal behavior and and other safety concerns  A safety and risk management plan has not been developed at this time, however client was given the after-hours number / 911 should there be a change in any of these risk factors.      Referred To:  nabil Monroy LP   5/13/2019

## 2019-05-13 NOTE — TELEPHONE ENCOUNTER
Medication is being filled for 1 time refill only due to:  due for appt, scheduled     Amparo Turcios RN, BS  Clinical Nurse Triage.

## 2019-05-16 ENCOUNTER — MYC MEDICAL ADVICE (OUTPATIENT)
Dept: FAMILY MEDICINE | Facility: CLINIC | Age: 34
End: 2019-05-16

## 2019-05-16 DIAGNOSIS — E55.9 VITAMIN D DEFICIENCY: Primary | ICD-10-CM

## 2019-07-21 DIAGNOSIS — E55.9 VITAMIN D DEFICIENCY: ICD-10-CM

## 2019-07-22 NOTE — TELEPHONE ENCOUNTER
"Requested Prescriptions   Pending Prescriptions Disp Refills     cholecalciferol (VITAMIN D3) 43712 units (1250 mcg) capsule [Pharmacy Med Name: VITAMIN D3 50,000 UNIT CAPSULE] 12 capsule 0     Sig: TAKE ONE CAPSULE BY MOUTH ONE TIME PER WEEK   Last Written Prescription Date:  5/1/19  Last Fill Quantity: 12,  # refills: 0   Last Office Visit: 4/19/2019 [facility]      Return in about 1 month (around 5/19/2019) for Follow up- phone visit.     Future Office Visit:    Next 5 appointments (look out 90 days)    Jul 24, 2019  2:40 PM CDT  Office Visit with Osmar Rendon PA-C  [facility]) [address] [place] [address], [address]  [facility] [address] [phone]             Vitamin Supplements (Adult) Protocol Failed - 7/21/2019  1:32 PM        Failed - High dose Vitamin D not ordered        Passed - Recent (12 mo) or future (30 days) visit within the authorizing provider's specialty     Patient had office visit in the last 12 months or has a visit in the next 30 days with authorizing provider or within the authorizing provider's specialty.  See \"Patient Info\" tab in inbasket, or \"Choose Columns\" in Meds & Orders section of the refill encounter.              Passed - Medication is active on med list        "

## 2019-07-23 NOTE — TELEPHONE ENCOUNTER
Has appt 7.24.19, verify if needs to continue this dose    Amparo Turcios RN, BS  Clinical Nurse Triage.

## 2019-07-24 ENCOUNTER — OFFICE VISIT (OUTPATIENT)
Dept: FAMILY MEDICINE | Facility: CLINIC | Age: 34
End: 2019-07-24
Payer: COMMERCIAL

## 2019-07-24 VITALS
DIASTOLIC BLOOD PRESSURE: 86 MMHG | HEART RATE: 84 BPM | RESPIRATION RATE: 16 BRPM | TEMPERATURE: 98.4 F | BODY MASS INDEX: 31.32 KG/M2 | SYSTOLIC BLOOD PRESSURE: 120 MMHG | WEIGHT: 206 LBS

## 2019-07-24 DIAGNOSIS — E66.811 CLASS 1 OBESITY WITHOUT SERIOUS COMORBIDITY WITH BODY MASS INDEX (BMI) OF 32.0 TO 32.9 IN ADULT, UNSPECIFIED OBESITY TYPE: ICD-10-CM

## 2019-07-24 DIAGNOSIS — F32.A FATIGUE DUE TO DEPRESSION: ICD-10-CM

## 2019-07-24 DIAGNOSIS — R53.83 FATIGUE DUE TO DEPRESSION: ICD-10-CM

## 2019-07-24 DIAGNOSIS — F32.1 MODERATE MAJOR DEPRESSION (H): Primary | ICD-10-CM

## 2019-07-24 PROCEDURE — 99214 OFFICE O/P EST MOD 30 MIN: CPT | Performed by: PHYSICIAN ASSISTANT

## 2019-07-24 RX ORDER — PHENTERMINE HYDROCHLORIDE 37.5 MG/1
37.5 CAPSULE ORAL EVERY MORNING
Qty: 37.5 CAPSULE | Refills: 2 | Status: SHIPPED | OUTPATIENT
Start: 2019-07-24 | End: 2020-07-29

## 2019-07-24 RX ORDER — BUPROPION HYDROCHLORIDE 300 MG/1
300 TABLET ORAL EVERY MORNING
Qty: 90 TABLET | Refills: 0 | Status: SHIPPED | OUTPATIENT
Start: 2019-07-24 | End: 2019-10-13

## 2019-07-24 ASSESSMENT — PATIENT HEALTH QUESTIONNAIRE - PHQ9
5. POOR APPETITE OR OVEREATING: NOT AT ALL
SUM OF ALL RESPONSES TO PHQ QUESTIONS 1-9: 7

## 2019-07-24 ASSESSMENT — ANXIETY QUESTIONNAIRES
3. WORRYING TOO MUCH ABOUT DIFFERENT THINGS: SEVERAL DAYS
6. BECOMING EASILY ANNOYED OR IRRITABLE: MORE THAN HALF THE DAYS
GAD7 TOTAL SCORE: 5
IF YOU CHECKED OFF ANY PROBLEMS ON THIS QUESTIONNAIRE, HOW DIFFICULT HAVE THESE PROBLEMS MADE IT FOR YOU TO DO YOUR WORK, TAKE CARE OF THINGS AT HOME, OR GET ALONG WITH OTHER PEOPLE: SOMEWHAT DIFFICULT
7. FEELING AFRAID AS IF SOMETHING AWFUL MIGHT HAPPEN: SEVERAL DAYS
2. NOT BEING ABLE TO STOP OR CONTROL WORRYING: NOT AT ALL
1. FEELING NERVOUS, ANXIOUS, OR ON EDGE: SEVERAL DAYS
5. BEING SO RESTLESS THAT IT IS HARD TO SIT STILL: NOT AT ALL

## 2019-07-24 NOTE — PROGRESS NOTES
Subjective     Bella Scott is a 33 year old female who presents to clinic today for the following health issues:    History of Present Illness        Mental Health Follow-up:  Patient presents to follow-up on Depression & Anxiety.Patient's depression since last visit has been:  Better  The patient is not having other symptoms associated with depression.  Patient's anxiety since last visit has been:  No change  The patient is not having other symptoms associated with anxiety.  Any significant life events: No  Patient is not feeling anxious or having panic attacks.  Patient has no concerns about alcohol or drug use.     Social History  Tobacco Use    Smoking status: Never Smoker    Smokeless tobacco: Never Used    Tobacco comment: no 2nd hand smoke at home  Alcohol use: Yes    Alcohol/week: 0.0 oz    Comment: once a month  Drug use: No      Today's PHQ-9         PHQ-9 Total Score:         PHQ-9 Q9 Thoughts of better off dead/self-harm past 2 weeks :       Thoughts of suicide or self harm:      Self-harm Plan:        Self-harm Action:          Safety concerns for self or others:            Bella is here for follow up.  Consistent concerns over weight.  Still tired.  Naltrexone made her feel awful- more tired.  So she stopped taking it.  Saw a therapist- [facility] in [place], mentioned [facility]  Zoloft- 6 months  Lexapro- 1 month  Prozac- 1 month  Wellbutrin- still taking  Naltrexone- made worse  Phentermine- did well for 6 months, lost effects    Wt Readings from Last 10 Encounters:   07/24/19 93.4 kg (206 lb)   04/19/19 92.4 kg (203 lb 12.8 oz)   02/27/19 88.5 kg (195 lb)   02/15/19 88.5 kg (195 lb)   01/15/19 89.8 kg (198 lb)   08/16/18 92.3 kg (203 lb 8 oz)   05/24/18 98.1 kg (216 lb 4.8 oz)   03/05/18 104.9 kg (231 lb 3.2 oz)   02/22/18 104.3 kg (230 lb)   02/06/18 104.6 kg (230 lb 9.6 oz)       Reviewed and updated as needed this visit by Provider         Review of Systems   ROS COMP: Constitutional,  "HEENT, cardiovascular, pulmonary, gi and gu systems are negative, except as otherwise noted.      Objective    /86 (BP Location: Right arm, Patient Position: Sitting, Cuff Size: Adult Regular)   Pulse 84   Temp 98.4  F (36.9  C) (Oral)   Resp 16   Wt 93.4 kg (206 lb)   BMI 31.32 kg/m    Body mass index is 31.32 kg/m .  Physical Exam   GENERAL: healthy, alert and no distress  NECK: no adenopathy, no asymmetry, masses, or scars and thyroid normal to palpation  MS: no gross musculoskeletal defects noted, no edema  SKIN: no suspicious lesions or rashes  PSYCH: mentation appears normal, affect normal/bright    Diagnostic Test Results:  Labs reviewed in Epic        Assessment & Plan     1. Mild major depression (H)  She is agreeable to staying on Wellbutrin at this time.  Will do Genesight testing today.  - buPROPion (WELLBUTRIN XL) 300 MG 24 hr tablet; Take 1 tablet (300 mg) by mouth every morning  Dispense: 90 tablet; Refill: 0    2. Class 1 obesity without serious comorbidity with body mass index (BMI) of 32.0 to 32.9 in adult, unspecified obesity type  Restart.  Referral for Health .  - phentermine (ADIPEX-P) 37.5 MG capsule; Take 1 capsule (37.5 mg) by mouth every morning  Dispense: 37.5 capsule; Refill: 2  - HEALTH  REFERRAL     BMI:   Estimated body mass index is 31.32 kg/m  as calculated from the following:    Height as of 4/19/19: 1.727 m (5' 8\").    Weight as of this encounter: 93.4 kg (206 lb).   Weight management plan: Wellbutrin, health , phentermine        Return in about 1 month (around 8/24/2019) for Med Check, Follow up.    Osmar Rendon PA-C  [facility]    "

## 2019-07-25 ASSESSMENT — ANXIETY QUESTIONNAIRES: GAD7 TOTAL SCORE: 5

## 2019-07-29 ENCOUNTER — MEDICAL CORRESPONDENCE (OUTPATIENT)
Dept: HEALTH INFORMATION MANAGEMENT | Facility: CLINIC | Age: 34
End: 2019-07-29

## 2019-07-29 ENCOUNTER — TRANSFERRED RECORDS (OUTPATIENT)
Dept: HEALTH INFORMATION MANAGEMENT | Facility: CLINIC | Age: 34
End: 2019-07-29

## 2019-07-30 ENCOUNTER — PATIENT OUTREACH (OUTPATIENT)
Dept: NURSING | Facility: CLINIC | Age: 34
End: 2019-07-30

## 2019-07-30 NOTE — PROGRESS NOTES
Received Health Coaching Referral-Outreached patient for the first time, but wasn't able to connect. Left message for call back.    Bigg Dutton, Health    7/30/2019    11:39 AM

## 2019-08-07 ENCOUNTER — TELEPHONE (OUTPATIENT)
Dept: FAMILY MEDICINE | Facility: CLINIC | Age: 34
End: 2019-08-07

## 2019-08-08 RX ORDER — CHOLECALCIFEROL (VITAMIN D3) 1250 MCG
CAPSULE ORAL
Qty: 12 CAPSULE | Refills: 0 | Status: SHIPPED | OUTPATIENT
Start: 2019-08-08 | End: 2020-11-27

## 2019-08-12 ENCOUNTER — TELEPHONE (OUTPATIENT)
Dept: FAMILY MEDICINE | Facility: CLINIC | Age: 34
End: 2019-08-12

## 2019-08-12 NOTE — TELEPHONE ENCOUNTER
Prior Authorization Retail Medication Request    Medication/Dose: phentermine (ADIPEX-P) 37.5 MG capsule  ICD code (if different than what is on RX):  Class 1 obesity without serious comorbidity with body mass index (BMI) of 32.0 to 32.9 in adult, unspecified obesity type [E66.9, Z68.32]   Previously Tried and Failed:  none  Rationale:  Patient has been taking this medication since 04/17/2018    Insurance Name:  [employer]  Insurance ID:  [id number]      Pharmacy Information (if different than what is on RX)  Name:  [facility] [address] [place] [facility] [address]  [address]  Phone:  [phone]      Elvis WALTERS

## 2019-08-29 ENCOUNTER — OFFICE VISIT (OUTPATIENT)
Dept: FAMILY MEDICINE | Facility: CLINIC | Age: 34
End: 2019-08-29
Payer: COMMERCIAL

## 2019-08-29 VITALS
DIASTOLIC BLOOD PRESSURE: 74 MMHG | BODY MASS INDEX: 31.47 KG/M2 | TEMPERATURE: 98.6 F | WEIGHT: 207 LBS | HEART RATE: 88 BPM | RESPIRATION RATE: 20 BRPM | SYSTOLIC BLOOD PRESSURE: 120 MMHG | OXYGEN SATURATION: 95 %

## 2019-08-29 DIAGNOSIS — J02.9 ACUTE PHARYNGITIS, UNSPECIFIED ETIOLOGY: Primary | ICD-10-CM

## 2019-08-29 PROCEDURE — 99213 OFFICE O/P EST LOW 20 MIN: CPT | Performed by: FAMILY MEDICINE

## 2019-08-29 RX ORDER — AZITHROMYCIN 250 MG/1
TABLET, FILM COATED ORAL
Qty: 6 TABLET | Refills: 0 | Status: SHIPPED | OUTPATIENT
Start: 2019-08-29 | End: 2019-09-03

## 2019-08-29 NOTE — PROGRESS NOTES
Subjective     Bella Scott is a 33 year old female who presents to clinic today for the following health issues:    HPI   Acute Illness   Acute illness concerns: sore throat  Onset: couple weeks    Fever: no    Chills/Sweats: no    Headache (location?): YES    Sinus Pressure:YES    Conjunctivitis:  no    Ear Pain: YES- pressure    Rhinorrhea: no    Congestion: YES    Sore Throat: YES     Cough: YES-productive of clear sputum, worsening over time    Wheeze: no    Decreased Appetite: YES    Nausea: no    Vomiting: no    Diarrhea:  no    Dysuria/Freq.: no    Fatigue/Achiness: YES- fatigu    Sick/Strep Exposure: no     Therapies Tried and outcome: tylenol cold max, ibuprofen    Symptoms ongoing for 2 weeks now. Throat pain is mild all throughout the day, but worse at night. It is painful to swallow. Her cough is worse in the mornings and she has coughing spells throughout the day. Cough is productive and it worsens her throat pain. Denies any chest pain. She has been having headaches and ear pain, and her ears feel completely plugged. Wonders if she caught whatever her son had because he was coughing in the mornings. Her  now has similar symptoms and he tested negative for strep.     Other problems to add on...  -Per patient, her last pap smear was shortly after the birth of her 2.5 year old son. She had her pap smear done with her OBGYN.       Recent Labs   Lab Test 01/15/19  1109 06/19/18  1639 04/16/18  0925  11/07/17  1135  05/05/17  1224  04/22/16  0933 01/06/16  1028  05/30/12  1157   A1C  --   --   --   --   --   --   --   --  5.6  --   --   --    LDL  --   --  158*  --   --   --  220*  --  135*  --    < > 130*   HDL  --   --  30*  --   --   --  42*  --  39*  --    < > 38*   TRIG  --   --  211*  --   --   --  197*  --  97  --    < > 115   ALT  --   --   --   --  21  --   --   --   --  17  --  17   CR  --   --   --   --  0.65  --   --   --   --  0.66  --  0.63   GFRESTIMATED  --   --   --   --  >90  --    --   --   --  >90  Non  GFR Calc    --  >90   GFRESTBLACK  --   --   --   --  >90  --   --   --   --  >90  African American GFR Calc    --  >90   POTASSIUM  --   --   --   --  3.9  --   --   --   --  3.7  --  3.6   TSH 2.48 0.94  --    < >  --    < > 0.11*   < > 0.17* 3.20   < >  --     < > = values in this interval not displayed.      BP Readings from Last 3 Encounters:   08/29/19 120/74   07/24/19 120/86   04/19/19 108/70    Wt Readings from Last 3 Encounters:   08/29/19 93.9 kg (207 lb)   07/24/19 93.4 kg (206 lb)   04/19/19 92.4 kg (203 lb 12.8 oz)        Reviewed and updated as needed this visit by Provider         Review of Systems   ROS COMP: Constitutional, HEENT, cardiovascular, pulmonary, gi and gu systems are negative, except as otherwise noted.    This document serves as a record of the services and decisions personally performed and made by Reba Hernandez MD. It was created on her behalf by Giselle William, a trained medical scribe. The creation of this document is based on the provider's statements to the medical scribe.  Giselle William August 29, 2019 4:32 PM         Objective    /74 (BP Location: Right arm, Patient Position: Sitting, Cuff Size: Adult Regular)   Pulse 88   Temp 98.6  F (37  C) (Oral)   Resp 20   Wt 93.9 kg (207 lb)   SpO2 95%   BMI 31.47 kg/m    Body mass index is 31.47 kg/m .     Physical Exam   GENERAL: healthy, alert and no distress  HENT: ear canals and TM's normal, nose and mouth without ulcers or lesions. Irritated and erythematic oropharynx.  NECK: no adenopathy, no asymmetry, masses, or scars and thyroid normal to palpation  RESP: lungs clear to auscultation - no rales, rhonchi or wheezes  CV: regular rate and rhythm, normal S1 S2, no S3 or S4, no murmur, click or rub, no peripheral edema and peripheral pulses strong  MS: no gross musculoskeletal defects noted, no edema  SKIN: no suspicious lesions or rashes  NEURO: Normal strength and tone,  "mentation intact and speech normal  PSYCH: mentation appears normal, affect normal/bright    Diagnostic Test Results:  Labs reviewed in Epic        Assessment & Plan     (J02.9) Acute pharyngitis, unspecified etiology  (primary encounter diagnosis)  Comment: Symptoms ongoing for 2 weeks now, starting azithromycin. Recommended lozenges with benzocaine for throat pain and OTC debrox for ear congestion.   Plan: azithromycin (ZITHROMAX) 250 MG tablet          Patient will call her OB and verify when her last pap was.        BMI:   Estimated body mass index is 31.47 kg/m  as calculated from the following:    Height as of 4/19/19: 1.727 m (5' 8\").    Weight as of this encounter: 93.9 kg (207 lb).   Weight management plan: Discussed healthy diet and exercise guidelines    FUTURE APPOINTMENTS:       - Follow-up visit in 1 week with Osmar for pap smear and to review [facility] results    The information in this document, created by the medical scribe for me, accurately reflects the services I personally performed and the decisions made by me. I have reviewed and approved this document for accuracy prior to leaving the patient care area.  August 29, 2019 4:40 PM    Reba Hernandez MD  [facility]    "

## 2019-10-07 ENCOUNTER — OFFICE VISIT (OUTPATIENT)
Dept: FAMILY MEDICINE | Facility: CLINIC | Age: 34
End: 2019-10-07
Payer: COMMERCIAL

## 2019-10-07 VITALS
WEIGHT: 215 LBS | DIASTOLIC BLOOD PRESSURE: 68 MMHG | TEMPERATURE: 98.1 F | BODY MASS INDEX: 32.69 KG/M2 | HEART RATE: 81 BPM | OXYGEN SATURATION: 98 % | SYSTOLIC BLOOD PRESSURE: 112 MMHG | RESPIRATION RATE: 16 BRPM

## 2019-10-07 DIAGNOSIS — F32.1 MODERATE MAJOR DEPRESSION (H): Primary | ICD-10-CM

## 2019-10-07 DIAGNOSIS — E03.9 HYPOTHYROIDISM, UNSPECIFIED TYPE: ICD-10-CM

## 2019-10-07 DIAGNOSIS — R53.83 OTHER FATIGUE: ICD-10-CM

## 2019-10-07 DIAGNOSIS — D17.30 LIPOMA OF SKIN AND SUBCUTANEOUS TISSUE: ICD-10-CM

## 2019-10-07 DIAGNOSIS — Z23 NEED FOR PROPHYLACTIC VACCINATION AND INOCULATION AGAINST INFLUENZA: ICD-10-CM

## 2019-10-07 DIAGNOSIS — E55.9 VITAMIN D DEFICIENCY: ICD-10-CM

## 2019-10-07 LAB
ERYTHROCYTE [DISTWIDTH] IN BLOOD BY AUTOMATED COUNT: 12.3 % (ref 10–15)
HCT VFR BLD AUTO: 37.2 % (ref 35–47)
HGB BLD-MCNC: 12.5 G/DL (ref 11.7–15.7)
MCH RBC QN AUTO: 29.2 PG (ref 26.5–33)
MCHC RBC AUTO-ENTMCNC: 33.6 G/DL (ref 31.5–36.5)
MCV RBC AUTO: 87 FL (ref 78–100)
PLATELET # BLD AUTO: 311 10E9/L (ref 150–450)
RBC # BLD AUTO: 4.28 10E12/L (ref 3.8–5.2)
WBC # BLD AUTO: 6.2 10E9/L (ref 4–11)

## 2019-10-07 PROCEDURE — 84443 ASSAY THYROID STIM HORMONE: CPT | Performed by: PHYSICIAN ASSISTANT

## 2019-10-07 PROCEDURE — 36415 COLL VENOUS BLD VENIPUNCTURE: CPT | Performed by: PHYSICIAN ASSISTANT

## 2019-10-07 PROCEDURE — 82306 VITAMIN D 25 HYDROXY: CPT | Performed by: PHYSICIAN ASSISTANT

## 2019-10-07 PROCEDURE — 83540 ASSAY OF IRON: CPT | Performed by: PHYSICIAN ASSISTANT

## 2019-10-07 PROCEDURE — 99214 OFFICE O/P EST MOD 30 MIN: CPT | Performed by: PHYSICIAN ASSISTANT

## 2019-10-07 PROCEDURE — 83550 IRON BINDING TEST: CPT | Performed by: PHYSICIAN ASSISTANT

## 2019-10-07 PROCEDURE — 82728 ASSAY OF FERRITIN: CPT | Performed by: PHYSICIAN ASSISTANT

## 2019-10-07 PROCEDURE — 85027 COMPLETE CBC AUTOMATED: CPT | Performed by: PHYSICIAN ASSISTANT

## 2019-10-07 RX ORDER — BUPROPION HYDROCHLORIDE 150 MG/1
150 TABLET ORAL EVERY MORNING
Qty: 30 TABLET | Refills: 0 | Status: SHIPPED | OUTPATIENT
Start: 2019-10-07 | End: 2020-07-29

## 2019-10-07 RX ORDER — BUPROPION HYDROCHLORIDE 300 MG/1
300 TABLET ORAL EVERY MORNING
Qty: 90 TABLET | Refills: 0 | Status: CANCELLED | OUTPATIENT
Start: 2019-10-07

## 2019-10-07 ASSESSMENT — ANXIETY QUESTIONNAIRES
6. BECOMING EASILY ANNOYED OR IRRITABLE: NEARLY EVERY DAY
3. WORRYING TOO MUCH ABOUT DIFFERENT THINGS: SEVERAL DAYS
GAD7 TOTAL SCORE: 6
7. FEELING AFRAID AS IF SOMETHING AWFUL MIGHT HAPPEN: SEVERAL DAYS
4. TROUBLE RELAXING: NOT AT ALL
7. FEELING AFRAID AS IF SOMETHING AWFUL MIGHT HAPPEN: SEVERAL DAYS
5. BEING SO RESTLESS THAT IT IS HARD TO SIT STILL: NOT AT ALL
1. FEELING NERVOUS, ANXIOUS, OR ON EDGE: SEVERAL DAYS
GAD7 TOTAL SCORE: 6
GAD7 TOTAL SCORE: 6
2. NOT BEING ABLE TO STOP OR CONTROL WORRYING: NOT AT ALL

## 2019-10-07 ASSESSMENT — PATIENT HEALTH QUESTIONNAIRE - PHQ9
SUM OF ALL RESPONSES TO PHQ QUESTIONS 1-9: 6
SUM OF ALL RESPONSES TO PHQ QUESTIONS 1-9: 6
10. IF YOU CHECKED OFF ANY PROBLEMS, HOW DIFFICULT HAVE THESE PROBLEMS MADE IT FOR YOU TO DO YOUR WORK, TAKE CARE OF THINGS AT HOME, OR GET ALONG WITH OTHER PEOPLE: SOMEWHAT DIFFICULT

## 2019-10-07 NOTE — PROGRESS NOTES
Subjective     Bella Scott is a 33 year old female who presents to clinic today for the following health issues:    History of Present Illness        Mental Health Follow-up:  Patient presents to follow-up on Depression & Anxiety.Patient's depression since last visit has been:  No change  The patient is not having other symptoms associated with depression.  Patient's anxiety since last visit has been:  No change  The patient is not having other symptoms associated with anxiety.  Any significant life events: No  Patient is not feeling anxious or having panic attacks.  Patient has no concerns about alcohol or drug use.     Social History  Tobacco Use    Smoking status: Never Smoker    Smokeless tobacco: Never Used    Tobacco comment: no 2nd hand smoke at home  Alcohol use: Yes    Alcohol/week: 0.0 standard drinks    Comment: once a month  Drug use: No      Today's PHQ-9         PHQ-9 Total Score:     (P) 6   PHQ-9 Q9 Thoughts of better off dead/self-harm past 2 weeks :   (P) Not at all   Thoughts of suicide or self harm:      Self-harm Plan:        Self-harm Action:          Safety concerns for self or others:           Hypothyroidism:     Since last visit, patient describes the following symptoms::  Anxiety, Depression, Fatigue and Weight gain    Weight gain::  Less than 5 lbs.    She eats 2-3 servings of fruits and vegetables daily.She consumes 2 sweetened beverage(s) daily.  She is taking medications regularly.       Wt Readings from Last 4 Encounters:   10/07/19 97.5 kg (215 lb)   08/29/19 93.9 kg (207 lb)   07/24/19 93.4 kg (206 lb)   04/19/19 92.4 kg (203 lb 12.8 oz)     Concerned about weight going up.  Wants to start on phentermine again.  Stop Wellbutrin?    Bella had Genesight testing done a while back and is here to review results.  She has not been able to find good results with many ssri type medications over the years for depression.  Fatigue is also a main complaint of hers.  She does not stay long on  various medications we have tried.  Wellbutrin seems to be the one she sticks with longest and is on currently.    Hx of hypothyroidism and low vitamin D- wants both tested again today.    Lipoma- present for 6-7 years, right neck/shoulder.  Getting bigger, would like to consider removal.    Reviewed and updated as needed this visit by Provider         Review of Systems   ROS COMP: Constitutional, HEENT, cardiovascular, pulmonary, gi and gu systems are negative, except as otherwise noted.      Objective    There were no vitals taken for this visit.  There is no height or weight on file to calculate BMI.  Physical Exam   GENERAL: healthy, alert and no distress  NECK: no adenopathy, no asymmetry, masses, or scars and thyroid normal to palpation  MS: large, soft, mobile nontender lipoma near right clavicle, approx 3 x 4 cm in size, no edema  SKIN: no suspicious lesions or rashes  PSYCH: mentation appears normal, affect flat and fatigued    Diagnostic Test Results:  Labs reviewed in Epic  Results for orders placed or performed in visit on 10/07/19   Vitamin D Deficiency   Result Value Ref Range    Vitamin D Deficiency screening 30 20 - 75 ug/L   TSH with free T4 reflex   Result Value Ref Range    TSH 2.24 0.40 - 4.00 mU/L   CBC with platelets   Result Value Ref Range    WBC 6.2 4.0 - 11.0 10e9/L    RBC Count 4.28 3.8 - 5.2 10e12/L    Hemoglobin 12.5 11.7 - 15.7 g/dL    Hematocrit 37.2 35.0 - 47.0 %    MCV 87 78 - 100 fl    MCH 29.2 26.5 - 33.0 pg    MCHC 33.6 31.5 - 36.5 g/dL    RDW 12.3 10.0 - 15.0 %    Platelet Count 311 150 - 450 10e9/L   Iron and iron binding capacity   Result Value Ref Range    Iron 68 35 - 180 ug/dL    Iron Binding Cap 315 240 - 430 ug/dL    Iron Saturation Index 22 15 - 46 %   Ferritin   Result Value Ref Range    Ferritin 48 12 - 150 ng/mL           Assessment & Plan     1. Moderate major depression (H)  She wishes to stop Wellbutrin today- I feel that she should stay on this but she wants to  "stop.  She will re-start phentermine which she has at home.  She is hoping that taking off some weight will improve her mood as well.    We reviewed Genesight results- Pristiq/Effexor both options for mood.  Would recommend trying one of these next.    - buPROPion (WELLBUTRIN XL) 150 MG 24 hr tablet; Take 1 tablet (150 mg) by mouth every morning  Dispense: 30 tablet; Refill: 0    2. Vitamin D deficiency  Not currently taking any.  Has previously done the weekly Rx.  - Vitamin D Deficiency    3. Other fatigue  Will wean off Wellbutrin for now.  Check labs today again.  - buPROPion (WELLBUTRIN XL) 150 MG 24 hr tablet; Take 1 tablet (150 mg) by mouth every morning  Dispense: 30 tablet; Refill: 0  - TSH with free T4 reflex  - CBC with platelets  - Iron and iron binding capacity  - Ferritin    4. Hypothyroidism, unspecified type  This was normal.  She does not need refills.  - TSH with free T4 reflex    5. Lipoma of skin and subcutaneous tissue  Mentioned this today and desire for removal.  - GENERAL SURG ADULT REFERRAL    6. Need for prophylactic vaccination and inoculation against influenza         BMI:   Estimated body mass index is 32.69 kg/m  as calculated from the following:    Height as of 4/19/19: 1.727 m (5' 8\").    Weight as of this encounter: 97.5 kg (215 lb).   Weight management plan: Discussed healthy diet and exercise guidelines phentermine        Return in about 6 weeks (around 11/18/2019) for Follow up- virtual visit follow up.    Osmar Rendon PA-C  [facility]    Answers for HPI/ROS submitted by the patient on 10/7/2019   Chronic problems general questions HPI Form  If you checked off any problems, how difficult have these problems made it for you to do your work, take care of things at home, or get along with other people?: Somewhat difficult  PHQ9 TOTAL SCORE: 6  DESIREE 7 TOTAL SCORE: 6    "

## 2019-10-08 ASSESSMENT — ANXIETY QUESTIONNAIRES: GAD7 TOTAL SCORE: 6

## 2019-10-08 ASSESSMENT — PATIENT HEALTH QUESTIONNAIRE - PHQ9: SUM OF ALL RESPONSES TO PHQ QUESTIONS 1-9: 6

## 2019-10-09 LAB
DEPRECATED CALCIDIOL+CALCIFEROL SERPL-MC: 30 UG/L (ref 20–75)
FERRITIN SERPL-MCNC: 48 NG/ML (ref 12–150)
IRON SATN MFR SERPL: 22 % (ref 15–46)
IRON SERPL-MCNC: 68 UG/DL (ref 35–180)
TIBC SERPL-MCNC: 315 UG/DL (ref 240–430)
TSH SERPL DL<=0.005 MIU/L-ACNC: 2.24 MU/L (ref 0.4–4)

## 2019-11-03 ENCOUNTER — HEALTH MAINTENANCE LETTER (OUTPATIENT)
Age: 34
End: 2019-11-03

## 2019-12-02 ENCOUNTER — MYC MEDICAL ADVICE (OUTPATIENT)
Dept: FAMILY MEDICINE | Facility: CLINIC | Age: 34
End: 2019-12-02

## 2019-12-02 ENCOUNTER — TELEPHONE (OUTPATIENT)
Dept: FAMILY MEDICINE | Facility: CLINIC | Age: 34
End: 2019-12-02

## 2019-12-02 ASSESSMENT — PATIENT HEALTH QUESTIONNAIRE - PHQ9
10. IF YOU CHECKED OFF ANY PROBLEMS, HOW DIFFICULT HAVE THESE PROBLEMS MADE IT FOR YOU TO DO YOUR WORK, TAKE CARE OF THINGS AT HOME, OR GET ALONG WITH OTHER PEOPLE: SOMEWHAT DIFFICULT
SUM OF ALL RESPONSES TO PHQ QUESTIONS 1-9: 5
SUM OF ALL RESPONSES TO PHQ QUESTIONS 1-9: 5

## 2019-12-02 NOTE — TELEPHONE ENCOUNTER
Panel Management Review      Patient has the following on her problem list:     Depression / Dysthymia review    Measure:  Needs PHQ-9 score of 4 or less during index window.  Administer PHQ-9 and if score is 5 or more, send encounter to provider for next steps.    5 - 7 month window range: 11/16/2019 - 3/15/2020    PHQ-9 SCORE 4/19/2019 7/24/2019 10/7/2019   PHQ-9 Total Score MyChart 11 (Moderate depression) - 6 (Mild depression)   PHQ-9 Total Score 12 7 6       If PHQ-9 recheck is 5 or more, route to provider for next steps.    Patient is due for:  PHQ9      Composite cancer screening  Chart review shows that this patient is due/due soon for the following None  Summary:    Patient is due/failing the following:   PHQ9    Action needed:   Patient needs to do PHQ9.    Type of outreach:    Sent [employer] message.    Questions for provider review:    None                                                                                                                                    Mercy Segal       Chart routed to Care Team .

## 2019-12-03 ASSESSMENT — PATIENT HEALTH QUESTIONNAIRE - PHQ9: SUM OF ALL RESPONSES TO PHQ QUESTIONS 1-9: 5

## 2020-01-08 ENCOUNTER — MYC MEDICAL ADVICE (OUTPATIENT)
Dept: FAMILY MEDICINE | Facility: CLINIC | Age: 35
End: 2020-01-08

## 2020-01-08 DIAGNOSIS — E66.811 CLASS 1 OBESITY WITHOUT SERIOUS COMORBIDITY WITH BODY MASS INDEX (BMI) OF 32.0 TO 32.9 IN ADULT, UNSPECIFIED OBESITY TYPE: Primary | ICD-10-CM

## 2020-01-08 NOTE — TELEPHONE ENCOUNTER
Please review [employer] message   Respond directly to pt if appropriate.      Amparo Turcios RN BS

## 2020-01-14 RX ORDER — PHENTERMINE HYDROCHLORIDE 15 MG/1
15 CAPSULE ORAL EVERY MORNING
Qty: 30 CAPSULE | Refills: 0 | Status: SHIPPED | OUTPATIENT
Start: 2020-01-14 | End: 2020-07-29

## 2020-02-10 ENCOUNTER — HEALTH MAINTENANCE LETTER (OUTPATIENT)
Age: 35
End: 2020-02-10

## 2020-07-02 ENCOUNTER — OFFICE VISIT (OUTPATIENT)
Dept: URGENT CARE | Facility: URGENT CARE | Age: 35
End: 2020-07-02
Payer: COMMERCIAL

## 2020-07-02 ENCOUNTER — RESULTS ONLY (OUTPATIENT)
Dept: LAB | Age: 35
End: 2020-07-02

## 2020-07-02 DIAGNOSIS — Z53.9 ERRONEOUS ENCOUNTER--DISREGARD: Primary | ICD-10-CM

## 2020-07-03 LAB
SARS-COV-2 RNA SPEC QL NAA+PROBE: NOT DETECTED
SPECIMEN SOURCE: NORMAL

## 2020-07-27 NOTE — PROGRESS NOTES
Pre-Visit Planning     Future Appointments   Date Time Provider Department Center   7/29/2020  3:40 PM Osmar Rendon PA-C [facility]     Arrival Time for this Appointment:  3:40 PM   Appointment Notes for this encounter:   Ph:[phone]- Thyroid/Weight loss; wishes to restart phentermine (stopped taking in March); would like to schedule lab work for thyroid; has reflux concerns - please advise    Questionnaires Reviewed/Assigned  Additional questionnaires assigned - PHQ-9    Patient preferred phone number: [phone]    Visit Summaries:   OV 10/07/2019 w/ A.P. - start weaning off wellbutrin per patient request; start back on phentermine for weight loss. Referred to general surgery for lipoma removal.       MyChart PVP sent 07/27/20  Please see patient response to [employer] message: Hello!  I am not on bupropion anymore and haven't been for a long time.  Im feeling good without any depression meds.  I also was on phentermine but quit taking it  when covid started in March.   I would like to restart it but I was hoping to start with 15mg and then wean up to 30mg (2 15mg/day instead of the 37.5mg as it speeds up my heart rate quite a bit and I think 30mg would be enough.  My weight is back up to 221 and I'm ready to lose it.  I also want to get my thyroid checked to see if I need a dose adjustment.   Can I please have a lab visit?  I also am pretty sure I have a little reflux.  Mostly at night as I wake up with a gross taste in my mouth and my throat burns every morning until I take a drink of water, what do you recommend?  Patient also noted that she has had less energy yesterday and today; had a fever of 100 yesterday and 101 today w/chills; wants to be tested for COVID      Evangelista PAIZ RN

## 2020-07-29 ENCOUNTER — VIRTUAL VISIT (OUTPATIENT)
Dept: FAMILY MEDICINE | Facility: CLINIC | Age: 35
End: 2020-07-29
Payer: COMMERCIAL

## 2020-07-29 DIAGNOSIS — E03.4 HYPOTHYROIDISM DUE TO ACQUIRED ATROPHY OF THYROID: ICD-10-CM

## 2020-07-29 DIAGNOSIS — E66.811 CLASS 1 OBESITY WITHOUT SERIOUS COMORBIDITY WITH BODY MASS INDEX (BMI) OF 32.0 TO 32.9 IN ADULT, UNSPECIFIED OBESITY TYPE: ICD-10-CM

## 2020-07-29 DIAGNOSIS — R50.9 FEVER, UNSPECIFIED FEVER CAUSE: ICD-10-CM

## 2020-07-29 DIAGNOSIS — F32.1 MODERATE MAJOR DEPRESSION (H): Primary | ICD-10-CM

## 2020-07-29 PROCEDURE — 99214 OFFICE O/P EST MOD 30 MIN: CPT | Mod: TEL | Performed by: PHYSICIAN ASSISTANT

## 2020-07-29 RX ORDER — PHENTERMINE HYDROCHLORIDE 15 MG/1
30 CAPSULE ORAL EVERY MORNING
Qty: 60 CAPSULE | Refills: 0 | Status: SHIPPED | OUTPATIENT
Start: 2020-07-29 | End: 2020-10-15

## 2020-07-29 NOTE — PROGRESS NOTES
"Bella Scott is a 34 year old female who is being evaluated via a billable telephone visit.      The patient has been notified of following:     \"This telephone visit will be conducted via a call between you and your physician/provider. We have found that certain health care needs can be provided without the need for a physical exam.  This service lets us provide the care you need with a short phone conversation.  If a prescription is necessary we can send it directly to your pharmacy.  If lab work is needed we can place an order for that and you can then stop by our lab to have the test done at a later time.    Telephone visits are billed at different rates depending on your insurance coverage. During this emergency period, for some insurers they may be billed the same as an in-person visit.  Please reach out to your insurance provider with any questions.    If during the course of the call the physician/provider feels a telephone visit is not appropriate, you will not be charged for this service.\"    Patient has given verbal consent for Telephone visit?  Yes    What phone number would you like to be contacted at? [phone]    How would you like to obtain your AVS? MyChart    Subjective     Bella Scott is a 34 year old female who presents via phone visit today for the following health issues:    HPI      Hypothyroidism Follow-up      Since last visit, patient describes the following symptoms: weight gain of 10 lbs and depression      How many servings of fruits and vegetables do you eat daily?  2-3    On average, how many sweetened beverages do you drink each day (Examples: soda, juice, sweet tea, etc.  Do NOT count diet or artificially sweetened beverages)?   One cup of coffee and one soda every other day     How many days per week do you exercise enough to make your heart beat faster? 5    How many minutes a day do you exercise enough to make your heart beat faster? 30 - 60    How many days per week do " "you miss taking your medication? 0    Feeling fatigued.  Wondering about getting a TSH done again.      Weight- wondering if she can try 15mg of phentermine.  Stopped taking the 37.5mg back in April.  The higher dose makes her heart race.    Depression and Anxiety Follow-Up   How are you doing with your depression since your last visit? Improved   How are you doing with your anxiety since your last visit? { :613889::\"No change\"}  Are you having other symptoms that might be associated with depression or anxiety? { :302557}  Have you had a significant life event? { :984021}   Do you have any concerns with your use of alcohol or other drugs? { :972467}  Social History           Tobacco Use     Smoking status: Never Smoker     Smokeless tobacco: Never Used     Tobacco comment: no 2nd hand smoke at home   Substance Use Topics     Alcohol use: Yes     Alcohol/week: 0.0 standard drinks     Comment: once a month     Drug use: No     PHQ 10/7/2019 12/2/2019 7/29/2020   PHQ-9 Total Score 6 5 2   Q9: Thoughts of better off dead/self-harm past 2 weeks Not at all Not at all Not at all     DESIREE-7 SCORE 4/19/2019 7/24/2019 10/7/2019   Total Score - - 6 (mild anxiety)   Total Score 6 5 6   Concern for COVID-19   About how many days ago did these symptoms start? 2   Is this your first visit for this illness? {Yes_No branch:955905}   In the 14 days before your symptoms started, have you had close contact with someone with COVID-19 (Coronavirus)? {COVID19 CLOSE CONTACT:850832}   Do you have a fever or chills? Yes, I felt feverish or had chills Temp up to 102   Are you having new or worsening difficulty breathing? No   Do you have new or worsening cough? No   Have you had any new or unexplained body aches? YES   Have you experienced any of the following NEW symptoms?   Headache: YES   Sore throat: No   Loss of taste or smell: No   Chest pain: No   Diarrhea: YES   Rash: No  What treatments have you tried? ***   Who do you live with? *** " "  Are you, or a household member, a healthcare worker or a ? {YES_No_***:986905}   Do you live in a nursing home, group home, or shelter? {YES_No_***:042363}   Do you have a way to get food/medications if quarantined? {COVID19 Quarantine Assistance:463225}   {Provider  Link to COVID SmartSet :524060}   Yesterday headache, fatigue and temp 100. Today temp up to 102 this afternoon and now headache. Has been wearing masks when out and when at work. No known exposures.    Reviewed and updated as needed this visit by Provider         Review of Systems   Constitutional, HEENT, cardiovascular, pulmonary, gi and gu systems are negative, except as otherwise noted.       Objective   Reported vitals:  There were no vitals taken for this visit.   {GENERAL APPEARANCE:50::\"healthy\",\"alert\",\"no distress\"}  PSYCH: Alert and oriented times 3; coherent speech, normal   rate and volume, able to articulate logical thoughts, able   to abstract reason, no tangential thoughts, no hallucinations   or delusions  Her affect is { :4485537::\"normal\"}  RESP: No cough, no audible wheezing, able to talk in full sentences  Remainder of exam unable to be completed due to telephone visits    {Diagnostic Test Results (Optional):796772::\"Diagnostic Test Results:\",\"Labs reviewed in Epic\"}        Assessment/Plan:    1. Moderate major depression (H)  PHQ 10/7/2019 12/2/2019 7/29/2020   PHQ-9 Total Score 6 5 2   Q9: Thoughts of better off dead/self-harm past 2 weeks Not at all Not at all Not at all       2. Fever, unspecified fever cause  ***  - Symptomatic COVID-19 Virus (Coronavirus) by PCR; Future    3. Class 1 obesity without serious comorbidity with body mass index (BMI) of 32.0 to 32.9 in adult, unspecified obesity type  ***  - phentermine (ADIPEX-P) 15 MG capsule; Take 2 capsules (30 mg) by mouth every morning  Dispense: 60 capsule; Refill: 0    4. Hypothyroidism due to acquired atrophy of thyroid  ***  - **TSH with free T4 reflex " FUTURE anytime; Future    Return in about 3 months (around 10/29/2020) for phentermine med check.      Phone call duration:  25 minutes    Osmar Rendon PA-C

## 2020-07-29 NOTE — PROGRESS NOTES
"Subjective     Bella Scott is a 34 year old female who is being evaluated via a billable telephone visit.       The patient has been notified of following:      \"This telephone visit will be conducted via a call between you and your physician/provider. We have found that certain health care needs can be provided without the need for a physical exam.  This service lets us provide the care you need with a short phone conversation.  If a prescription is necessary we can send it directly to your pharmacy.  If lab work is needed we can place an order for that and you can then stop by our lab to have the test done at a later time.     Telephone visits are billed at different rates depending on your insurance coverage. During this emergency period, for some insurers they may be billed the same as an in-person visit.  Please reach out to your insurance provider with any questions.     If during the course of the call the physician/provider feels a telephone visit is not appropriate, you will not be charged for this service.\"     Patient has given verbal consent for Telephone visit?  Yes     What phone number would you like to be contacted at? [phone]     How [facility]       Depression and Anxiety Follow-Up    How are you doing with your depression since your last visit? Improved     How are you doing with your anxiety since your last visit?  Improved     Are you having other symptoms that might be associated with depression or anxiety? No    Have you had a significant life event? No     Do you have any concerns with your use of alcohol or other drugs? No    Social History     Tobacco Use     Smoking status: Never Smoker     Smokeless tobacco: Never Used     Tobacco comment: no 2nd hand smoke at home   Substance Use Topics     Alcohol use: Yes     Alcohol/week: 0.0 standard drinks     Comment: once a month     Drug use: No     PHQ 10/7/2019 12/2/2019 7/29/2020   PHQ-9 Total " Score 6 5 2   Q9: Thoughts of better off dead/self-harm past 2 weeks Not at all Not at all Not at all     DESIREE-7 SCORE 4/19/2019 7/24/2019 10/7/2019   Total Score - - 6 (mild anxiety)   Total Score 6 5 6     Tracee has actually recently stopped taking her Wellbutrin.  States she is feeling fine without it.    Weight and thyroid- would like a TSH done due to fatigue.  Also has been gaining weight since shut down.  Starting dieting again.  Would like to try phentermine but 15mg dose and eventually take maybe two tabs rather than 37.5mg.  She gets some heart racing from it.      Concern for COVID-19    Tracee was tested not long ago also due to not feeling well but this time she has a fever.  Says she never really gets a fever for any reason.    About how many days ago did these symptoms start? 2  Is this your first visit for this illness? No   How would you describe your symptoms since your last visit? My symptoms have resolved but these are new again.  How many days has it been since your symptoms have resolved? 2 weeks.  In the 14 days before your symptoms started, have you had close contact with someone with COVID-19 (Coronavirus)? No  Do you have a fever or chills? Yes, I felt feverish or had chills Temp up to 102  Are you having new or worsening difficulty breathing? No  Do you have new or worsening cough? No  Have you had any new or unexplained body aches? YES    Have you experienced any of the following NEW symptoms?    Headache: YES    Sore throat: No    Loss of taste or smell: No    Chest pain: No    Diarrhea: YES    Rash: No  What treatments have you tried? tylenol  Who do you live with? , kids  Are you, or a household member, a healthcare worker or a ? No  Do you live in a nursing home, group home, or shelter? No  Do you have a way to get food/medications if quarantined? Yes, I have a friend or family member who can help me.    Yesterday headache, fatigue and temp 100.  Today temp up to  102 this afternoon and now headache.  Has been wearing masks when out and when at work.  No known exposures.  Hypothyroidism Follow-up      Since last visit, patient describes the following symptoms: No hair loss, no skin changes, no constipation, no loose stools and weight gain of 10 lbs    Reviewed and updated as needed this visit by Provider         Review of Systems   Constitutional, HEENT, cardiovascular, pulmonary, gi and gu systems are negative, except as otherwise noted.      Objective    There were no vitals taken for this visit.  There is no height or weight on file to calculate BMI.  Physical Exam   Reported vitals:  There were no vitals taken for this visit.   alert and no distress  PSYCH: Alert and oriented times 3; coherent speech, normal   rate and volume, able to articulate logical thoughts, able   to abstract reason, no tangential thoughts, no hallucinations   or delusions  Her affect is normal and pleasant, a little anxious today  RESP: No cough, no audible wheezing, able to talk in full sentences  Remainder of exam unable to be completed due to telephone visits    Diagnostic Test Results:  Labs reviewed in Epic        Assessment & Plan     1. Moderate major depression (H)  PHQ 10/7/2019 12/2/2019 7/29/2020   PHQ-9 Total Score 6 5 2   Q9: Thoughts of better off dead/self-harm past 2 weeks Not at all Not at all Not at all     She does tend to go on and off Wellbutrin over time.  PHQ does indicate control right now.  Will need to monitor.  She did seem a bit anxious today.    2. Fever, unspecified fever cause  COVID test ordered today for screening.  Supportive care otherwise.  Call with concerns.  - Symptomatic COVID-19 Virus (Coronavirus) by PCR; Future    3. Class 1 obesity without serious comorbidity with body mass index (BMI) of 32.0 to 32.9 in adult, unspecified obesity type  Follow up 3 months for med check.  Phone/video ok.  - phentermine (ADIPEX-P) 15 MG capsule; Take 2 capsules (30 mg) by  mouth every morning  Dispense: 60 capsule; Refill: 0    4. Hypothyroidism due to acquired atrophy of thyroid    - **TSH with free T4 reflex FUTURE anytime; Future      Return in about 3 months (around 10/29/2020) for phentermine med check.    Osmar Rendon PA-C  [facility]

## 2020-07-30 ENCOUNTER — TELEPHONE (OUTPATIENT)
Dept: FAMILY MEDICINE | Facility: CLINIC | Age: 35
End: 2020-07-30

## 2020-07-30 NOTE — TELEPHONE ENCOUNTER
7/30/2020    Please assist schedule  Return  (around 10/29/2020) for phentermine med check. When Pt returns call

## 2020-10-13 DIAGNOSIS — E03.4 HYPOTHYROIDISM DUE TO ACQUIRED ATROPHY OF THYROID: ICD-10-CM

## 2020-10-14 DIAGNOSIS — E66.811 CLASS 1 OBESITY WITHOUT SERIOUS COMORBIDITY WITH BODY MASS INDEX (BMI) OF 32.0 TO 32.9 IN ADULT, UNSPECIFIED OBESITY TYPE: ICD-10-CM

## 2020-10-14 RX ORDER — LEVOTHYROXINE SODIUM 112 UG/1
TABLET ORAL
Qty: 90 TABLET | Refills: 0 | Status: SHIPPED | OUTPATIENT
Start: 2020-10-14 | End: 2020-12-03

## 2020-10-14 NOTE — TELEPHONE ENCOUNTER
Routing refill request to provider for review/approval because:  Drug not on the [facility] refill protocol '    Isela Og RN   [facility] -- Triage Nurse

## 2020-10-14 NOTE — TELEPHONE ENCOUNTER
Pt has appointment scheduled  Prescription approved per [facility] Refill Protocol.  Kathryn Mcdaniels RN, BSN  Message handled by CLINIC NURSE.

## 2020-10-15 RX ORDER — PHENTERMINE HYDROCHLORIDE 15 MG/1
30 CAPSULE ORAL EVERY MORNING
Qty: 30 CAPSULE | Refills: 0 | Status: SHIPPED | OUTPATIENT
Start: 2020-10-15 | End: 2020-10-22

## 2020-10-22 ENCOUNTER — TELEPHONE (OUTPATIENT)
Dept: FAMILY MEDICINE | Facility: CLINIC | Age: 35
End: 2020-10-22

## 2020-10-22 DIAGNOSIS — E03.4 HYPOTHYROIDISM DUE TO ACQUIRED ATROPHY OF THYROID: Primary | ICD-10-CM

## 2020-10-22 DIAGNOSIS — E66.811 CLASS 1 OBESITY WITHOUT SERIOUS COMORBIDITY WITH BODY MASS INDEX (BMI) OF 32.0 TO 32.9 IN ADULT, UNSPECIFIED OBESITY TYPE: ICD-10-CM

## 2020-10-22 RX ORDER — PHENTERMINE HYDROCHLORIDE 15 MG/1
30 CAPSULE ORAL EVERY MORNING
Qty: 60 CAPSULE | Refills: 0 | Status: SHIPPED | OUTPATIENT
Start: 2020-10-22 | End: 2020-11-27

## 2020-10-22 NOTE — TELEPHONE ENCOUNTER
Spoke with patient.  She is requesting a refill of her Phentermine 15mg (2 tabs daily) #60 for a 30 day supply to be sent to her pharmacy to make it to through to her appointment on 1119/2020.  RX t'd up.  Please approve.    Also, patient would like to come in for lab work prior to her office visit. (which is not until 4:40pm) TSH lab has already been ordered.  She would like to check her cholesterol as it has been elevated in the past.      Component      Latest Ref Rng & Units 4/16/2018   Cholesterol      <200 mg/dL 230 (H)   Triglycerides      <150 mg/dL 211 (H)   HDL Cholesterol      >49 mg/dL 30 (L)   LDL Cholesterol Calculated      <100 mg/dL 158 (H)   Non HDL Cholesterol      <130 mg/dL 200 (H)     Orders placed.  Please approve.  Jacque Montez RN

## 2020-10-22 NOTE — TELEPHONE ENCOUNTER
General Call:     Who is calling:  Bella    Reason for Call:  Pt was told she needed to reschedule appt. New appt is on 11/19/2020 Pt stated the last appt she made she was only given a refill half a month and her insurance wouldn't cover it.  What are your questions or concerns:  Pt would like a full refill of meds due to this reschedule for her med check not being her fault.    Date of last appointment with provider: 0729/2020    Okay to leave a detailed message:No at Cell number on file:    Telephone Information:   Mobile [phone]      Estee West Patient Representative

## 2020-11-16 ENCOUNTER — HEALTH MAINTENANCE LETTER (OUTPATIENT)
Age: 35
End: 2020-11-16

## 2020-11-24 NOTE — PROGRESS NOTES
Pre-Visit Planning   Next 5 appointments (look out 90 days)    Nov 27, 2020 10:30 AM  (Arrive by 10:10 AM)  Office Visit with Kiran Duff PA-C  [facility] ([address] [address] [phone]        Appointment Notes for this encounter:      *Pt is requesting in person visit for labwork*   CAN BE VIRTUAL- med check/labs    Questionnaires Reviewed/Assigned  No additional questionnaires are needed      Patient preferred phone number: [phone]    Patient contact not needed.

## 2020-11-27 ENCOUNTER — OFFICE VISIT (OUTPATIENT)
Dept: FAMILY MEDICINE | Facility: CLINIC | Age: 35
End: 2020-11-27
Payer: COMMERCIAL

## 2020-11-27 VITALS
DIASTOLIC BLOOD PRESSURE: 77 MMHG | RESPIRATION RATE: 19 BRPM | BODY MASS INDEX: 30.3 KG/M2 | TEMPERATURE: 98.1 F | HEIGHT: 68 IN | WEIGHT: 199.9 LBS | OXYGEN SATURATION: 99 % | HEART RATE: 104 BPM | SYSTOLIC BLOOD PRESSURE: 115 MMHG

## 2020-11-27 DIAGNOSIS — E03.4 HYPOTHYROIDISM DUE TO ACQUIRED ATROPHY OF THYROID: ICD-10-CM

## 2020-11-27 DIAGNOSIS — M54.2 NECK PAIN: ICD-10-CM

## 2020-11-27 DIAGNOSIS — E66.811 CLASS 1 OBESITY WITHOUT SERIOUS COMORBIDITY WITH BODY MASS INDEX (BMI) OF 32.0 TO 32.9 IN ADULT, UNSPECIFIED OBESITY TYPE: ICD-10-CM

## 2020-11-27 DIAGNOSIS — L60.9 DISEASE OF NAIL: Primary | ICD-10-CM

## 2020-11-27 DIAGNOSIS — E55.9 VITAMIN D DEFICIENCY: ICD-10-CM

## 2020-11-27 DIAGNOSIS — E03.9 HYPOTHYROIDISM, UNSPECIFIED TYPE: ICD-10-CM

## 2020-11-27 PROCEDURE — 84443 ASSAY THYROID STIM HORMONE: CPT | Performed by: PHYSICIAN ASSISTANT

## 2020-11-27 PROCEDURE — 84439 ASSAY OF FREE THYROXINE: CPT | Performed by: PHYSICIAN ASSISTANT

## 2020-11-27 PROCEDURE — 99214 OFFICE O/P EST MOD 30 MIN: CPT | Performed by: PHYSICIAN ASSISTANT

## 2020-11-27 PROCEDURE — 36415 COLL VENOUS BLD VENIPUNCTURE: CPT | Performed by: PHYSICIAN ASSISTANT

## 2020-11-27 PROCEDURE — 82306 VITAMIN D 25 HYDROXY: CPT | Performed by: PHYSICIAN ASSISTANT

## 2020-11-27 PROCEDURE — 80061 LIPID PANEL: CPT | Performed by: PHYSICIAN ASSISTANT

## 2020-11-27 RX ORDER — PHENTERMINE HYDROCHLORIDE 15 MG/1
30 CAPSULE ORAL EVERY MORNING
Qty: 60 CAPSULE | Refills: 0 | Status: SHIPPED | OUTPATIENT
Start: 2020-11-27 | End: 2021-02-15

## 2020-11-27 ASSESSMENT — ANXIETY QUESTIONNAIRES
6. BECOMING EASILY ANNOYED OR IRRITABLE: SEVERAL DAYS
IF YOU CHECKED OFF ANY PROBLEMS ON THIS QUESTIONNAIRE, HOW DIFFICULT HAVE THESE PROBLEMS MADE IT FOR YOU TO DO YOUR WORK, TAKE CARE OF THINGS AT HOME, OR GET ALONG WITH OTHER PEOPLE: SOMEWHAT DIFFICULT
7. FEELING AFRAID AS IF SOMETHING AWFUL MIGHT HAPPEN: NOT AT ALL
3. WORRYING TOO MUCH ABOUT DIFFERENT THINGS: SEVERAL DAYS
1. FEELING NERVOUS, ANXIOUS, OR ON EDGE: NOT AT ALL
2. NOT BEING ABLE TO STOP OR CONTROL WORRYING: SEVERAL DAYS
GAD7 TOTAL SCORE: 3
5. BEING SO RESTLESS THAT IT IS HARD TO SIT STILL: NOT AT ALL

## 2020-11-27 ASSESSMENT — PATIENT HEALTH QUESTIONNAIRE - PHQ9
SUM OF ALL RESPONSES TO PHQ QUESTIONS 1-9: 2
5. POOR APPETITE OR OVEREATING: NOT AT ALL

## 2020-11-27 ASSESSMENT — PAIN SCALES - GENERAL: PAINLEVEL: NO PAIN (0)

## 2020-11-27 ASSESSMENT — MIFFLIN-ST. JEOR: SCORE: 1645.24

## 2020-11-27 NOTE — PROGRESS NOTES
Subjective      Bella Scott is a 35 year old female who presents to clinic today for the following health issues:    HPI         Depression and Anxiety Follow-Up    How are you doing with your depression since your last visit? Improved     How are you doing with your anxiety since your last visit?  Improved     Are you having other symptoms that might be associated with depression or anxiety? No    Have you had a significant life event? No     Do you have any concerns with your use of alcohol or other drugs? 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 Symptoms of nerve pain when cold has returned.    Having left sided neck pain. Feels like nerve pain that radiates to under tongue and into shoulder at times. Very intermittent. Wondering about imaging or ENT referral.      Social History     Tobacco Use     Smoking status: Never Smoker     Smokeless tobacco: Never Used     Tobacco comment: no 2nd hand smoke at home   Substance Use Topics     Alcohol use: Yes     Alcohol/week: 0.0 standard drinks     Comment: once a month     Drug use: No     PHQ 12/2/2019 7/29/2020 11/27/2020   PHQ-9 Total Score 5 2 2   Q9: Thoughts of better off dead/self-harm past 2 weeks Not at all Not at all Not at all     DESIREE-7 SCORE 7/24/2019 10/7/2019 11/27/2020   Total Score - 6 (mild anxiety) -   Total Score 5 6 3     Last PHQ-9 11/27/2020   1.  Little interest or pleasure in doing things 1   2.  Feeling down, depressed, or hopeless 0   3.  Trouble falling or staying asleep, or sleeping too much 0   4.  Feeling tired or having little energy 1   5.  Poor appetite or overeating 0   6.  Feeling bad about yourself 0   7.  Trouble concentrating 0   8.  Moving slowly or restless 0   Q9: Thoughts of better off dead/self-harm past 2 weeks 0   PHQ-9 Total Score 2   Difficulty at work, home, or with people Somewhat difficult     DESIREE-7  11/27/2020   1. Feeling nervous, anxious, or on edge 0   2. Not being able to stop or  "control worrying 1   3. Worrying too much about different things 1   4. Trouble relaxing 0   5. Being so restless that it is hard to sit still 0   6. Becoming easily annoyed or irritable 1   7. Feeling afraid, as if something awful might happen 0   DESIREE-7 Total Score 3   If you checked any problems, how difficult have they made it for you to do your work, take care of things at home, or get along with other people? Somewhat difficult       Suicide Assessment Five-step Evaluation and Treatment (SAFE-T)      How many servings of fruits and vegetables do you eat daily?  2-3    On average, how many sweetened beverages do you drink each day (Examples: soda, juice, sweet tea, etc.  Do NOT count diet or artificially sweetened beverages)?   1    How many days per week do you exercise enough to make your heart beat faster? 5    How many minutes a day do you exercise enough to make your heart beat faster? 30 - 60    How many days per week do you miss taking your medication? 0        Review of Systems   Constitutional, HEENT, cardiovascular, pulmonary, GI, , musculoskeletal, neuro, skin, endocrine and psych systems are negative, except as otherwise noted.        Objective    /77 (BP Location: Right arm, Patient Position: Sitting, Cuff Size: Adult Regular)   Pulse 104   Temp 98.1  F (36.7  C) (Oral)   Resp 19   Ht 1.727 m (5' 8\")   Wt 90.7 kg (199 lb 14.4 oz)   SpO2 99%   BMI 30.39 kg/m    Body mass index is 30.39 kg/m .         Physical Exam   GENERAL: healthy, alert and no distress  EYES: Eyes grossly normal to inspection, PERRL and conjunctivae and sclerae normal  NECK: no adenopathy, no asymmetry, masses, or scars and thyroid normal to palpation  RESP: lungs clear to auscultation - no rales, rhonchi or wheezes  CV: regular rate and rhythm, normal S1 S2, no S3 or S4, no murmur, click or rub, no peripheral edema and peripheral pulses strong  MS: no gross musculoskeletal defects noted, no edema  SKIN: no " "suspicious lesions or rashes  NEURO: Normal strength and tone, mentation intact and speech normal  PSYCH: mentation appears normal, affect normal/bright  LYMPH: no cervical, supraclavicular, axillary, or inguinal adenopathy    Labs pending.        Assessment & Plan     Class 1 obesity without serious comorbidity with body mass index (BMI) of 32.0 to 32.9 in adult, unspecified obesity type    Continue phentermine and check-in in 30 days via [employer] message or e-visit.    - phentermine (ADIPEX-P) 15 MG capsule  Dispense: 60 capsule; Refill: 0      Disease of nail    Will obtain MRI to see if glomus tumor has returned.    - MR Upper Extremity Hand Joint Right      Vitamin D deficiency    Re-check Vitamin D. Takes multivitamin.     - Vitamin D Deficiency      Neck pain    Will obtain US. Unclear cause. Can refer to ENT if negative US.    - US Head Neck Soft Tissue     Hypothyroidism    - Re-checking thyroid today. Has enough medication for now.      BMI:   Estimated body mass index is 30.39 kg/m  as calculated from the following:    Height as of this encounter: 1.727 m (5' 8\").    Weight as of this encounter: 90.7 kg (199 lb 14.4 oz).   Weight management plan: Discussed healthy diet and exercise guidelines and continue phentermine         There are no Patient Instructions on file for this visit.    No follow-ups on file.    Kiran Duff PA-C  [facility]    "

## 2020-11-27 NOTE — PATIENT INSTRUCTIONS
Follow-up in about 1 month with e-visit or my chart message to see how weight and pulse are.     Results will be sent to your MyChart.

## 2020-11-28 ASSESSMENT — ANXIETY QUESTIONNAIRES: GAD7 TOTAL SCORE: 3

## 2020-11-30 LAB — DEPRECATED CALCIDIOL+CALCIFEROL SERPL-MC: 29 UG/L (ref 20–75)

## 2020-12-03 DIAGNOSIS — E03.4 HYPOTHYROIDISM DUE TO ACQUIRED ATROPHY OF THYROID: ICD-10-CM

## 2020-12-03 LAB
CHOLEST SERPL-MCNC: 203 MG/DL
HDLC SERPL-MCNC: 35 MG/DL
LDLC SERPL CALC-MCNC: 151 MG/DL
NONHDLC SERPL-MCNC: 168 MG/DL
T4 FREE SERPL-MCNC: 1.34 NG/DL (ref 0.76–1.46)
TRIGL SERPL-MCNC: 84 MG/DL
TSH SERPL DL<=0.005 MIU/L-ACNC: 0.27 MU/L (ref 0.4–4)

## 2020-12-03 RX ORDER — LEVOTHYROXINE SODIUM 112 UG/1
112 TABLET ORAL DAILY
Qty: 90 TABLET | Refills: 0 | Status: SHIPPED | OUTPATIENT
Start: 2020-12-03 | End: 2021-02-24

## 2020-12-03 NOTE — RESULT ENCOUNTER NOTE
Your thyroid test is slightly off, however. This may be lab error, you may need a dose adjustment, or you may be developing a different thyroid condition.  I recommend you stay the course, and check the thyroid again in 3 months.  Manoj Peck MD for Osmar Rendon

## 2020-12-18 ENCOUNTER — HOSPITAL ENCOUNTER (OUTPATIENT)
Dept: ULTRASOUND IMAGING | Facility: CLINIC | Age: 35
End: 2020-12-18
Attending: PHYSICIAN ASSISTANT
Payer: COMMERCIAL

## 2020-12-18 ENCOUNTER — HOSPITAL ENCOUNTER (OUTPATIENT)
Dept: MRI IMAGING | Facility: CLINIC | Age: 35
End: 2020-12-18
Attending: PHYSICIAN ASSISTANT
Payer: COMMERCIAL

## 2020-12-18 DIAGNOSIS — L60.9 DISEASE OF NAIL: ICD-10-CM

## 2020-12-18 DIAGNOSIS — M54.2 NECK PAIN: ICD-10-CM

## 2020-12-18 PROCEDURE — 255N000002 HC RX 255 OP 636: Performed by: PHYSICIAN ASSISTANT

## 2020-12-18 PROCEDURE — 76536 US EXAM OF HEAD AND NECK: CPT

## 2020-12-18 PROCEDURE — A9585 GADOBUTROL INJECTION: HCPCS | Performed by: PHYSICIAN ASSISTANT

## 2020-12-18 PROCEDURE — 73221 MRI JOINT UPR EXTREM W/O DYE: CPT | Mod: RT

## 2020-12-18 RX ORDER — GADOBUTROL 604.72 MG/ML
10 INJECTION INTRAVENOUS ONCE
Status: COMPLETED | OUTPATIENT
Start: 2020-12-18 | End: 2020-12-18

## 2020-12-18 RX ADMIN — GADOBUTROL 9 ML: 604.72 INJECTION INTRAVENOUS at 08:49

## 2020-12-21 DIAGNOSIS — K14.6 TONGUE PAIN: ICD-10-CM

## 2020-12-21 DIAGNOSIS — M54.2 NECK PAIN: Primary | ICD-10-CM

## 2020-12-22 ENCOUNTER — MYC MEDICAL ADVICE (OUTPATIENT)
Dept: FAMILY MEDICINE | Facility: CLINIC | Age: 35
End: 2020-12-22

## 2021-01-21 DIAGNOSIS — E03.9 HYPOTHYROIDISM, UNSPECIFIED TYPE: ICD-10-CM

## 2021-01-21 PROCEDURE — 36415 COLL VENOUS BLD VENIPUNCTURE: CPT | Performed by: PHYSICIAN ASSISTANT

## 2021-01-21 PROCEDURE — 84439 ASSAY OF FREE THYROXINE: CPT | Performed by: PHYSICIAN ASSISTANT

## 2021-01-21 PROCEDURE — 84443 ASSAY THYROID STIM HORMONE: CPT | Performed by: PHYSICIAN ASSISTANT

## 2021-01-22 LAB
T4 FREE SERPL-MCNC: 1.37 NG/DL (ref 0.76–1.46)
TSH SERPL DL<=0.005 MIU/L-ACNC: 0.25 MU/L (ref 0.4–4)

## 2021-02-12 DIAGNOSIS — E66.811 CLASS 1 OBESITY WITHOUT SERIOUS COMORBIDITY WITH BODY MASS INDEX (BMI) OF 32.0 TO 32.9 IN ADULT, UNSPECIFIED OBESITY TYPE: ICD-10-CM

## 2021-02-12 NOTE — TELEPHONE ENCOUNTER
Routing refill request to provider for review/approval because:  Drug not on the [facility] refill protocol     Lainey Pal RN on 2/12/2021 at 11:14 AM

## 2021-02-15 RX ORDER — PHENTERMINE HYDROCHLORIDE 15 MG/1
CAPSULE ORAL
Qty: 60 CAPSULE | Refills: 0 | Status: SHIPPED | OUTPATIENT
Start: 2021-02-15 | End: 2021-04-07

## 2021-02-23 DIAGNOSIS — E03.4 HYPOTHYROIDISM DUE TO ACQUIRED ATROPHY OF THYROID: ICD-10-CM

## 2021-02-23 NOTE — LETTER
February 25, 2021      Bella Scott  [address]  [facility] [address]        Dear Ms. Bella Scott,    We recently received a call from your pharmacy requesting a refill of your medication Levothyroxine.    We are contacting you today to notify you that you are due for a lab work for further refills.    We have authorized one refill of your medication to allow time for you to schedule your appointment.    This appointment can be in clinic or virtual by either telephone, video.    Please call [phone] to schedule an appointment or if you have MyChart you can schedule with your provider as well.    Taking care of your health is important to us, an ongoing visits with your provider are vital to your care. We look forward to seeing you in the near future.    Thank you for using [facility] for your Medical Needs.          Sincerely,     Osmar Rendon PA-C

## 2021-02-24 RX ORDER — LEVOTHYROXINE SODIUM 112 UG/1
112 TABLET ORAL DAILY
Qty: 90 TABLET | Refills: 0 | Status: SHIPPED | OUTPATIENT
Start: 2021-02-24 | End: 2021-05-25

## 2021-02-24 NOTE — TELEPHONE ENCOUNTER
Last lab was checked a bit early.  I will refill for 90 days but she will need a lab for next refills.    Please call to inform.    Anuel

## 2021-03-18 ENCOUNTER — DOCUMENTATION ONLY (OUTPATIENT)
Dept: FAMILY MEDICINE | Facility: CLINIC | Age: 36
End: 2021-03-18

## 2021-03-18 DIAGNOSIS — E03.4 HYPOTHYROIDISM DUE TO ACQUIRED ATROPHY OF THYROID: Primary | ICD-10-CM

## 2021-03-18 DIAGNOSIS — E03.9 HYPOTHYROIDISM, UNSPECIFIED TYPE: ICD-10-CM

## 2021-03-24 DIAGNOSIS — E03.9 HYPOTHYROIDISM, UNSPECIFIED TYPE: ICD-10-CM

## 2021-03-24 DIAGNOSIS — E78.00 HYPERCHOLESTEREMIA: ICD-10-CM

## 2021-03-24 LAB — TSH SERPL DL<=0.005 MIU/L-ACNC: 1.18 MU/L (ref 0.4–4)

## 2021-03-24 PROCEDURE — 84443 ASSAY THYROID STIM HORMONE: CPT | Performed by: PHYSICIAN ASSISTANT

## 2021-03-24 PROCEDURE — 36415 COLL VENOUS BLD VENIPUNCTURE: CPT | Performed by: PHYSICIAN ASSISTANT

## 2021-03-24 PROCEDURE — 80061 LIPID PANEL: CPT | Performed by: PHYSICIAN ASSISTANT

## 2021-03-25 DIAGNOSIS — E78.00 HYPERCHOLESTEREMIA: Primary | ICD-10-CM

## 2021-03-25 LAB
CHOLEST SERPL-MCNC: 194 MG/DL
HDLC SERPL-MCNC: 44 MG/DL
LDLC SERPL CALC-MCNC: 136 MG/DL
NONHDLC SERPL-MCNC: 150 MG/DL
TRIGL SERPL-MCNC: 69 MG/DL

## 2021-04-07 ENCOUNTER — OFFICE VISIT (OUTPATIENT)
Dept: FAMILY MEDICINE | Facility: CLINIC | Age: 36
End: 2021-04-07
Payer: COMMERCIAL

## 2021-04-07 VITALS
DIASTOLIC BLOOD PRESSURE: 76 MMHG | HEIGHT: 69 IN | WEIGHT: 185.5 LBS | SYSTOLIC BLOOD PRESSURE: 108 MMHG | HEART RATE: 68 BPM | BODY MASS INDEX: 27.47 KG/M2 | RESPIRATION RATE: 16 BRPM | TEMPERATURE: 98.1 F | OXYGEN SATURATION: 98 %

## 2021-04-07 DIAGNOSIS — N60.12 FIBROCYSTIC CHANGES OF LEFT BREAST: ICD-10-CM

## 2021-04-07 DIAGNOSIS — Z00.00 ROUTINE GENERAL MEDICAL EXAMINATION AT A HEALTH CARE FACILITY: Primary | ICD-10-CM

## 2021-04-07 DIAGNOSIS — E04.2 MULTINODULAR GOITER: ICD-10-CM

## 2021-04-07 DIAGNOSIS — Z11.59 NEED FOR HEPATITIS C SCREENING TEST: ICD-10-CM

## 2021-04-07 DIAGNOSIS — E66.811 CLASS 1 OBESITY WITHOUT SERIOUS COMORBIDITY WITH BODY MASS INDEX (BMI) OF 32.0 TO 32.9 IN ADULT, UNSPECIFIED OBESITY TYPE: ICD-10-CM

## 2021-04-07 DIAGNOSIS — M54.2 NECK PAIN: ICD-10-CM

## 2021-04-07 PROCEDURE — 99395 PREV VISIT EST AGE 18-39: CPT | Performed by: PHYSICIAN ASSISTANT

## 2021-04-07 PROCEDURE — 86803 HEPATITIS C AB TEST: CPT | Performed by: PHYSICIAN ASSISTANT

## 2021-04-07 PROCEDURE — 36415 COLL VENOUS BLD VENIPUNCTURE: CPT | Performed by: PHYSICIAN ASSISTANT

## 2021-04-07 PROCEDURE — 99213 OFFICE O/P EST LOW 20 MIN: CPT | Mod: 25 | Performed by: PHYSICIAN ASSISTANT

## 2021-04-07 RX ORDER — PHENTERMINE HYDROCHLORIDE 15 MG/1
CAPSULE ORAL
Qty: 60 CAPSULE | Refills: 0 | Status: SHIPPED | OUTPATIENT
Start: 2021-04-07 | End: 2021-06-09

## 2021-04-07 ASSESSMENT — ENCOUNTER SYMPTOMS
HEMATOCHEZIA: 0
ARTHRALGIAS: 0
BREAST MASS: 1
PARESTHESIAS: 0
DIARRHEA: 0
JOINT SWELLING: 0
NERVOUS/ANXIOUS: 0
CHILLS: 0
ABDOMINAL PAIN: 0
NAUSEA: 0
CONSTIPATION: 0
DIZZINESS: 0
FEVER: 0
WEAKNESS: 0
EYE PAIN: 0
PALPITATIONS: 0
DYSURIA: 0
HEARTBURN: 0
HEMATURIA: 0
MYALGIAS: 0
FREQUENCY: 0
SHORTNESS OF BREATH: 0
HEADACHES: 0
COUGH: 0
SORE THROAT: 0

## 2021-04-07 ASSESSMENT — MIFFLIN-ST. JEOR: SCORE: 1591.83

## 2021-04-07 NOTE — NURSING NOTE
"Chief Complaint   Patient presents with     Physical     Initial /76 (BP Location: Right arm, Patient Position: Sitting, Cuff Size: Adult Regular)   Pulse 68   Temp 98.1  F (36.7  C) (Oral)   Resp 16   Ht 1.746 m (5' 8.75\")   Wt 84.1 kg (185 lb 8 oz)   SpO2 98%   BMI 27.59 kg/m   Estimated body mass index is 27.59 kg/m  as calculated from the following:    Height as of this encounter: 1.746 m (5' 8.75\").    Weight as of this encounter: 84.1 kg (185 lb 8 oz).  BP completed using cuff size regular long right arm    Lisa Magill, CMA    "

## 2021-04-07 NOTE — PROGRESS NOTES
SUBJECTIVE:   CC: Bella Scott is an 35 year old woman who presents for preventive health visit.       Patient has been advised of split billing requirements and indicates understanding: Yes     Healthy Habits:     Getting at least 3 servings of Calcium per day:  Yes    Bi-annual eye exam:  Yes    Dental care twice a year:  Yes    Sleep apnea or symptoms of sleep apnea:  None    Diet:  Regular (no restrictions)    Frequency of exercise:  4-5 days/week    Duration of exercise:  30-45 minutes    Taking medications regularly:  Yes    Medication side effects:  Not applicable    PHQ-2 Total Score: 0    Additional concerns today:  Yes    Left breast pain- wondering if cycle related.  Also started taking a new supplement with a lot of soy- pain on outer portion of breast.    Left neck- still having some discomfort- recently she is still having some pain in the area.    Weight- has been on phentermine for 8 months.  Lost 45lbs.  Scared to stop taking it.  She has been taking only one tab.      Today's PHQ-2 Score:   PHQ-2 ( 1999 [employer]) 4/7/2021   Q1: Little interest or pleasure in doing things 0   Q2: Feeling down, depressed or hopeless 0   PHQ-2 Score 0   Q1: Little interest or pleasure in doing things Not at all   Q2: Feeling down, depressed or hopeless Not at all   PHQ-2 Score 0       Abuse: Current or Past (Physical, Sexual or Emotional) - NO  Do you feel safe in your environment? YES    Have you ever done Advance Care Planning? (For example, a Health Directive, POLST, or a discussion with a medical provider or your loved ones about your wishes):     Social History     Tobacco Use     Smoking status: Never Smoker     Smokeless tobacco: Never Used     Tobacco comment: no 2nd hand smoke at home   Substance Use Topics     Alcohol use: Yes     Alcohol/week: 0.0 standard drinks     Comment: once a month     If you drink alcohol do you typically have >3 drinks per day or >7 drinks per week? No    Alcohol Use 4/7/2021    Prescreen: >3 drinks/day or >7 drinks/week? Not Applicable   Prescreen: >3 drinks/day or >7 drinks/week? -       Reviewed orders with patient.  Reviewed health maintenance and updated orders accordingly - Yes  Labs reviewed in EPIC    Breast Cancer Screening:  Any new diagnosis of family breast, ovarian, or bowel cancer? No    FSH-7:   Breast CA Risk Assessment (FHS-7) 4/7/2021   Did any of your first-degree relatives have breast or ovarian cancer? No   Did any of your relatives have bilateral breast cancer? No   Did any man in your family have breast cancer? No   Did any woman in your family have breast and ovarian cancer? Yes   Did any woman in your family have breast cancer before age 50 y? No   Do you have 2 or more relatives with breast and/or ovarian cancer? No   Do you have 2 or more relatives with breast and/or bowel cancer? No       Patient under 40 years of age: Routine Mammogram Screening not recommended.   Pertinent mammograms are reviewed under the imaging tab.    History of abnormal Pap smear: NO - age 30-65 PAP every 5 years with negative HPV co-testing recommended  PAP / HPV 1/6/2016 5/30/2012 5/16/2011   PAP NIL NIL NIL     Reviewed and updated as needed this visit by clinical staff  Tobacco  Allergies  Meds  Problems  Med Hx  Surg Hx  Fam Hx  Soc Hx          Reviewed and updated as needed this visit by Provider                    Review of Systems   Constitutional: Negative for chills and fever.   HENT: Negative for congestion, ear pain, hearing loss and sore throat.    Eyes: Negative for pain and visual disturbance.   Respiratory: Negative for cough and shortness of breath.    Cardiovascular: Negative for chest pain, palpitations and peripheral edema.   Gastrointestinal: Negative for abdominal pain, constipation, diarrhea, heartburn, hematochezia and nausea.   Breasts:  Positive for breast mass. Negative for tenderness and discharge.   Genitourinary: Negative for dysuria, frequency,  "genital sores, hematuria, pelvic pain, urgency, vaginal bleeding and vaginal discharge.   Musculoskeletal: Negative for arthralgias, joint swelling and myalgias.   Skin: Negative for rash.   Neurological: Negative for dizziness, weakness, headaches and paresthesias.   Psychiatric/Behavioral: Negative for mood changes. The patient is not nervous/anxious.           OBJECTIVE:   /76 (BP Location: Right arm, Patient Position: Sitting, Cuff Size: Adult Regular)   Pulse 68   Temp 98.1  F (36.7  C) (Oral)   Resp 16   Ht 1.746 m (5' 8.75\")   Wt 84.1 kg (185 lb 8 oz)   SpO2 98%   BMI 27.59 kg/m    Physical Exam  GENERAL: healthy, alert and no distress  EYES: Eyes grossly normal to inspection, PERRL and conjunctivae and sclerae normal  HENT: ear canals and TM's normal, nose and mouth without ulcers or lesions  NECK: no adenopathy, no asymmetry, masses, or scars and thyroid normal to palpation  RESP: lungs clear to auscultation - no rales, rhonchi or wheezes  BREAST: no palpable axillary masses or adenopathy, fibrocystic changes bilateral and there is an mobile mass, tender in outer quad of left breast - feels like a collection of cysts  CV: regular rate and rhythm, normal S1 S2, no S3 or S4, no murmur, click or rub, no peripheral edema and peripheral pulses strong  ABDOMEN: soft, nontender, no hepatosplenomegaly, no masses and bowel sounds normal  MS: no gross musculoskeletal defects noted, no edema  SKIN: no suspicious lesions or rashes  NEURO: Normal strength and tone, mentation intact and speech normal  PSYCH: mentation appears normal and anxious    Diagnostic Test Results:  Labs reviewed in Epic    ASSESSMENT/PLAN:   1. Routine general medical examination at a health care facility  She plans to get her PAP done with her OBGYN provider    2. Class 1 obesity without serious comorbidity with body mass index (BMI) of 32.0 to 32.9 in adult, unspecified obesity type  This is refilled today for one month. She feels " "better on it and still wishes to lose more weight.  Heart/lung exam are normal.  Will follow up in 3 months, ok for refills.  - phentermine (ADIPEX-P) 15 MG capsule; TAKE 2 CAPSULES BY MOUTH EVERY MORNING  Dispense: 60 capsule; Refill: 0    3. Neck pain  Referral for eval of ongoing issues with neck pain  - OTOLARYNGOLOGY REFERRAL    4. Multinodular goiter    - OTOLARYNGOLOGY REFERRAL    5. Need for hepatitis C screening test    - Hepatitis C Screen Reflex to HCV RNA Quant and Genotype    6. Fibrocystic changes of left breast  She is going to watch this through another 1-2 menstrual cycles.  If not resolving or if changing we will obtain mammo/US.      Patient has been advised of split billing requirements and indicates understanding: Yes  COUNSELING:  Reviewed preventive health counseling, as reflected in patient instructions    Estimated body mass index is 27.59 kg/m  as calculated from the following:    Height as of this encounter: 1.746 m (5' 8.75\").    Weight as of this encounter: 84.1 kg (185 lb 8 oz).    Weight management plan: Discussed healthy diet and exercise guidelines refill of phentermine    She reports that she has never smoked. She has never used smokeless tobacco.      Counseling Resources:  ATP IV Guidelines  Pooled Cohorts Equation Calculator  Breast Cancer Risk Calculator  BRCA-Related Cancer Risk Assessment: FHS-7 Tool  FRAX Risk Assessment  ICSI Preventive Guidelines  Dietary Guidelines for Americans, 2010  USDA's MyPlate  ASA Prophylaxis  Lung CA Screening    CARMITA Marie [facility]  "

## 2021-04-08 LAB — HCV AB SERPL QL IA: NONREACTIVE

## 2021-06-06 DIAGNOSIS — E66.811 CLASS 1 OBESITY WITHOUT SERIOUS COMORBIDITY WITH BODY MASS INDEX (BMI) OF 32.0 TO 32.9 IN ADULT, UNSPECIFIED OBESITY TYPE: ICD-10-CM

## 2021-06-08 NOTE — TELEPHONE ENCOUNTER
phentermine (ADIPEX-P) 15 MG capsule  Last Written Prescription Date:  4/7/21  Last Fill Quantity: 60,   # refills: 0  Last Office Visit: 4/7/21  Future Office visit:       Routing refill request to provider for review/approval because:  Drug not on the [facility], [facility] or [facility] refill protocol or controlled substance    Isis Dumont RN on 6/8/2021 at 11:20 AM

## 2021-06-09 RX ORDER — PHENTERMINE HYDROCHLORIDE 15 MG/1
CAPSULE ORAL
Qty: 60 CAPSULE | Refills: 0 | Status: SHIPPED | OUTPATIENT
Start: 2021-06-09 | End: 2022-01-15

## 2021-07-23 DIAGNOSIS — E03.4 HYPOTHYROIDISM DUE TO ACQUIRED ATROPHY OF THYROID: ICD-10-CM

## 2021-07-23 RX ORDER — LEVOTHYROXINE SODIUM 112 UG/1
TABLET ORAL
Qty: 90 TABLET | Refills: 1 | Status: SHIPPED | OUTPATIENT
Start: 2021-07-23 | End: 2022-01-25

## 2021-09-18 ENCOUNTER — HEALTH MAINTENANCE LETTER (OUTPATIENT)
Age: 36
End: 2021-09-18

## 2021-11-08 ENCOUNTER — TRANSFERRED RECORDS (OUTPATIENT)
Dept: HEALTH INFORMATION MANAGEMENT | Facility: CLINIC | Age: 36
End: 2021-11-08
Payer: COMMERCIAL

## 2022-01-15 ENCOUNTER — OFFICE VISIT (OUTPATIENT)
Dept: URGENT CARE | Facility: URGENT CARE | Age: 37
End: 2022-01-15
Payer: COMMERCIAL

## 2022-01-15 VITALS
DIASTOLIC BLOOD PRESSURE: 72 MMHG | OXYGEN SATURATION: 98 % | TEMPERATURE: 99.1 F | SYSTOLIC BLOOD PRESSURE: 108 MMHG | HEART RATE: 75 BPM

## 2022-01-15 DIAGNOSIS — R51.9 GENERALIZED HEADACHE: ICD-10-CM

## 2022-01-15 DIAGNOSIS — N89.8 VAGINAL DISCHARGE: ICD-10-CM

## 2022-01-15 DIAGNOSIS — R11.0 NAUSEA: ICD-10-CM

## 2022-01-15 DIAGNOSIS — Z20.822 SUSPECTED 2019 NOVEL CORONAVIRUS INFECTION: Primary | ICD-10-CM

## 2022-01-15 DIAGNOSIS — R50.9 FEVER IN ADULT: ICD-10-CM

## 2022-01-15 DIAGNOSIS — R68.89 FLU-LIKE SYMPTOMS: ICD-10-CM

## 2022-01-15 DIAGNOSIS — R53.83 FATIGUE, UNSPECIFIED TYPE: ICD-10-CM

## 2022-01-15 LAB
ALBUMIN UR-MCNC: NEGATIVE MG/DL
APPEARANCE UR: CLEAR
BACTERIA #/AREA URNS HPF: ABNORMAL /HPF
BASOPHILS # BLD AUTO: 0 10E3/UL (ref 0–0.2)
BASOPHILS NFR BLD AUTO: 1 %
BILIRUB UR QL STRIP: NEGATIVE
CLUE CELLS: ABNORMAL
COLOR UR AUTO: YELLOW
DEPRECATED S PYO AG THROAT QL EIA: NEGATIVE
EOSINOPHIL # BLD AUTO: 0.1 10E3/UL (ref 0–0.7)
EOSINOPHIL NFR BLD AUTO: 2 %
ERYTHROCYTE [DISTWIDTH] IN BLOOD BY AUTOMATED COUNT: 12.1 % (ref 10–15)
FLUAV AG SPEC QL IA: NEGATIVE
FLUBV AG SPEC QL IA: NEGATIVE
GLUCOSE UR STRIP-MCNC: NEGATIVE MG/DL
HCG UR QL: NEGATIVE
HCT VFR BLD AUTO: 38 % (ref 35–47)
HGB BLD-MCNC: 12.7 G/DL (ref 11.7–15.7)
HGB UR QL STRIP: ABNORMAL
KETONES UR STRIP-MCNC: NEGATIVE MG/DL
LEUKOCYTE ESTERASE UR QL STRIP: ABNORMAL
LYMPHOCYTES # BLD AUTO: 2.7 10E3/UL (ref 0.8–5.3)
LYMPHOCYTES NFR BLD AUTO: 36 %
MCH RBC QN AUTO: 29.1 PG (ref 26.5–33)
MCHC RBC AUTO-ENTMCNC: 33.4 G/DL (ref 31.5–36.5)
MCV RBC AUTO: 87 FL (ref 78–100)
MONOCYTES # BLD AUTO: 0.5 10E3/UL (ref 0–1.3)
MONOCYTES NFR BLD AUTO: 7 %
MONOCYTES NFR BLD AUTO: NEGATIVE %
NEUTROPHILS # BLD AUTO: 4.2 10E3/UL (ref 1.6–8.3)
NEUTROPHILS NFR BLD AUTO: 55 %
NITRATE UR QL: NEGATIVE
PH UR STRIP: 6 [PH] (ref 5–7)
PLATELET # BLD AUTO: 283 10E3/UL (ref 150–450)
RBC # BLD AUTO: 4.37 10E6/UL (ref 3.8–5.2)
RBC #/AREA URNS AUTO: ABNORMAL /HPF
SP GR UR STRIP: 1.02 (ref 1–1.03)
SQUAMOUS #/AREA URNS AUTO: ABNORMAL /LPF
TRICHOMONAS, WET PREP: ABNORMAL
UROBILINOGEN UR STRIP-ACNC: 1 E.U./DL
WBC # BLD AUTO: 7.6 10E3/UL (ref 4–11)
WBC #/AREA URNS AUTO: ABNORMAL /HPF
WBC'S/HIGH POWER FIELD, WET PREP: ABNORMAL
YEAST, WET PREP: ABNORMAL

## 2022-01-15 PROCEDURE — 87210 SMEAR WET MOUNT SALINE/INK: CPT | Performed by: PHYSICIAN ASSISTANT

## 2022-01-15 PROCEDURE — U0005 INFEC AGEN DETEC AMPLI PROBE: HCPCS | Performed by: PHYSICIAN ASSISTANT

## 2022-01-15 PROCEDURE — 87804 INFLUENZA ASSAY W/OPTIC: CPT | Performed by: PHYSICIAN ASSISTANT

## 2022-01-15 PROCEDURE — 36415 COLL VENOUS BLD VENIPUNCTURE: CPT | Performed by: PHYSICIAN ASSISTANT

## 2022-01-15 PROCEDURE — 99214 OFFICE O/P EST MOD 30 MIN: CPT | Performed by: PHYSICIAN ASSISTANT

## 2022-01-15 PROCEDURE — 81025 URINE PREGNANCY TEST: CPT | Performed by: PHYSICIAN ASSISTANT

## 2022-01-15 PROCEDURE — U0003 INFECTIOUS AGENT DETECTION BY NUCLEIC ACID (DNA OR RNA); SEVERE ACUTE RESPIRATORY SYNDROME CORONAVIRUS 2 (SARS-COV-2) (CORONAVIRUS DISEASE [COVID-19]), AMPLIFIED PROBE TECHNIQUE, MAKING USE OF HIGH THROUGHPUT TECHNOLOGIES AS DESCRIBED BY CMS-2020-01-R: HCPCS | Performed by: PHYSICIAN ASSISTANT

## 2022-01-15 PROCEDURE — 85025 COMPLETE CBC W/AUTO DIFF WBC: CPT | Performed by: PHYSICIAN ASSISTANT

## 2022-01-15 PROCEDURE — 81001 URINALYSIS AUTO W/SCOPE: CPT | Performed by: PHYSICIAN ASSISTANT

## 2022-01-15 PROCEDURE — 87651 STREP A DNA AMP PROBE: CPT | Performed by: PHYSICIAN ASSISTANT

## 2022-01-15 PROCEDURE — 86308 HETEROPHILE ANTIBODY SCREEN: CPT | Performed by: PHYSICIAN ASSISTANT

## 2022-01-15 NOTE — PROGRESS NOTES
ASSESSMENT/PLAN:        (Z20.822) Suspected 2019 novel coronavirus infection  (primary encounter diagnosis)      MDM: Viral syndrome symptoms. Due to her multi-system symptoms, additional etiologies are considered. Please see below patient discharge summary for further layperson MDM.   No evidence of secondary bacterial infection. No evidence of respiratory distress or other medical distress requiring immediate ER evaluation or hospitalization on history and exam here today.     Plan: Symptomatic; Yes COVID-19 Virus (Coronavirus)         by PCR Nose        January 15, 2022 [facility]:      At this time, your symptoms appear most likely to be caused by a virus. You may have a non-specific viral illness. It is also possible you could have a Covid-19 viral infection.      A Covid-19 PCR test was done here today.  The result typically comes back in 1-2 days (watch your MyChart or call the urgent care clinic for your result in 1-2 days).     Your influenza A and B screening was negative here today.     A quick Strep test was done here today (the result was negative/normal). Another Strep PCR test was done here today. Please watch your MyChart for that result (it typically comes back in 1-2 days).     You were also screened for Mono, pregnancy, occult (hidden) bladder infection, vaginal infection,  anemia, and blood count abnormalities. Your test results do not suggest any of the above.     Please continue with home comfort care measures (including as needed Tylenol or Ibuprofen for sore throat and fever) and extra rest and fluids.     Follow-up immediately if you have any sudden, severe, worsening of your symptoms or if you develop any of the below:         Chest pain, shortness of breath, wheezing, or difficulty breathing    Coughing up blood    Very severe pain with swallowing, especially if it goes along with a muffled voice        Please stay home/self-isolate (no contact with others outside of home)  until your Covid-19 test result is back (this typically takes 1-2 days), you are without fever for 24 hours (without use of fever reducing medication) and your symptoms are improving.       Please contact your work supervisor regarding their return to work policy when you have acute illness symptoms and you have had a Covid-19 test (with result pending).     I have also advised you follow-up with your primary care provider on Monday if your symptoms persist.       (R68.89) Flu-like symptoms  Plan: Influenza A & B Antigen - Clinic Collect, UA         without Microscopic - lab collect, HCG Qual,         Urine ([id number]), Streptococcus A Rapid Screen         w/Reflex to PCR - Clinic Collect, CBC with         platelets and differential, Mononucleosis         screen      (R50.9) Fever in adult  Plan: UA without Microscopic - lab collect, HCG Qual,        Urine ([id number]), Streptococcus A Rapid Screen         w/Reflex to PCR - Clinic Collect, CBC with         platelets and differential, Mononucleosis         screen      (R11.0) Nausea  Plan: UA without Microscopic - lab collect, HCG Qual,        Urine ([id number]), Streptococcus A Rapid Screen         w/Reflex to PCR - Clinic Collect, CBC with         platelets and differential, Mononucleosis         screen      (R53.83) Fatigue, unspecified type  Plan: UA without Microscopic - lab collect, HCG Qual,        Urine ([id number]), Streptococcus A Rapid Screen         w/Reflex to PCR - Clinic Collect, CBC with         platelets and differential, Mononucleosis         screen      (N89.8) Vaginal discharge  Plan: UA without Microscopic - lab collect, HCG Qual,        Urine ([id number]), CBC with platelets and         differential, Wet prep - Clinic Collect    (R51.9) Generalized headache  Plan: UA without Microscopic - lab collect, HCG Qual,        Urine ([id number]), Streptococcus A Rapid Screen         w/Reflex to PCR - Clinic Collect, CBC with         platelets and differential,  Mononucleosis         screen    ------------------------------------------            SUBJECTIVE:     Bella Scott is a very pleasant 36 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     Cough and nasal congestion are improved to nearly resolved, but fatigue and headache persist. 2 days ago, patient reports she had 24 hours of abdominal cramping and approximately 6 episodes of non-bloody diarrhea. No diarrhea for the past 24 hours. No associated vomiting.     Initial fever resolved, but returned again today. Patient measured temperature at home today (states it was 100.9 @ 1:30 pm today)         ROS:     CONSITUTIONAL: Positive for fever and fatigue as per above   CARDIAC: No acute onset chest pain or severe shortness of breath.    HEENT: no sore throat or ear drainage   RESP: No severe, coughing up blood, severe chest pain or shortness of breath   URINARY:  Denies any dysuria, urinary urgency/frequency, hematuria,back or flank pain.  GYN: . Mirena IUD for contraception (also uses condoms)--almost five years out from placement. Patient reports she typically gets spotting monthly, but states it has been more infrequent and irregular over the past few months.  Increased clear vaginal discharge. Declines STI screening.   GI: No acute onset abdominal pain. No acute onset nausea, vomiting or diarrhea   SKIN: No rash or hives   NEURO: Positive for waxing and wanting frontal and occipital headaches x 10 days. No severe neck stiffness (able to look left, right, up and down today)  RHEUM: No associated acute onset red, warm or swollen joints      Past Medical History:   Diagnosis Date     Anemia, unspecified 3/24/2003    iron deficiney per pt     Contraception      Unspecified hypothyroidism         Patient Active Problem List   Diagnosis     Contraception     Thyroid enlarged     Hypothyroidism     CARDIOVASCULAR SCREENING; LDL GOAL LESS THAN 160     Hypercholesteremia     Thyroid nodule     Multinodular goiter     Ovarian cyst     Indication for care in labor or delivery     Mild major depression (H)     S/P repeat low transverse      Obesity     Pain in the coccyx     Somatic dysfunction of lumbar region     Chronic bilateral low back pain without sciatica     Chronic right-sided low back pain without sciatica     Somatic dysfunction of sacral region     Sacral pain     Fibrocystic changes of left breast       Current Outpatient Medications   Medication     Cholecalciferol (VITAMIN D3 GUMMIES ADULT PO)     levonorgestrel (MIRENA) 20 MCG/24HR IUD     levothyroxine (SYNTHROID/LEVOTHROID) 112 MCG tablet     Prenatal Vit-Fe Fumarate-FA (PRENATABS FA) TABS     No current facility-administered medications for this visit.       Allergies   Allergen Reactions     No Known Allergies         OBJECTIVE:  /72   Pulse 75   Temp 99.1  F (37.3  C) (Tympanic)   SpO2 98%   Breastfeeding No             General appearance: alert and no apparent distress  Skin color is pink and without rash.  HEENT:   Conjunctiva not injected.  Sclera clear.  Left TM is normal: no effusions, no erythema, and normal landmarks.  Right TM is normal: no effusions, no erythema, and normal landmarks.  Nasal mucosa is normal.  Oropharyngeal exam is positive for mild erythema.  No asymmetry. Uvula is midline. No trismus. Voice is clear. No lesions, adenopathy, plaque or exudate.  Neck is supple, FROM. No neck stiffness. No adenopathy  CARDIAC:NORMAL - regular rate and rhythm without murmur.  RESP: No increased work of breathing. No retractions. No stridor. Lung fields are clear to ausculation. No rales, rhonchi, or wheezing.  ABDOMEN:  Soft, non-tender, non-distended.  Positive normal bowel sounds.  No HSM or masses.  No  suprapubic tenderness.  No CVA tenderness.  NEURO: Alert and oriented.  Normal speech and mentation.  CN II/XII grossly intact.  Gait within normal limits.          Results for orders placed or performed in visit on 01/15/22   Mononucleosis screen     Status: Normal   Result Value Ref Range    Mononucleosis Screen Negative Negative   HCG Qual, Urine ([id number])     Status: Normal   Result Value Ref Range    hCG Urine Qualitative Negative Negative   UA without Microscopic - lab collect     Status: Abnormal   Result Value Ref Range    Color Urine Yellow Colorless, Straw, Light Yellow, Yellow    Appearance Urine Clear Clear    Glucose Urine Negative Negative mg/dL    Bilirubin Urine Negative Negative    Ketones Urine Negative Negative mg/dL    Specific Gravity Urine 1.020 1.003 - 1.035    Blood Urine Trace (A) Negative    pH Urine 6.0 5.0 - 7.0    Protein Albumin Urine Negative Negative mg/dL    Urobilinogen Urine 1.0 0.2, 1.0 E.U./dL    Nitrite Urine Negative Negative    Leukocyte Esterase Urine Trace (A) Negative   CBC with platelets and differential     Status: None   Result Value Ref Range    WBC Count 7.6 4.0 - 11.0 10e3/uL    RBC Count 4.37 3.80 - 5.20 10e6/uL    Hemoglobin 12.7 11.7 - 15.7 g/dL    Hematocrit 38.0 35.0 - 47.0 %    MCV 87 78 - 100 fL    MCH 29.1 26.5 - 33.0 pg    MCHC 33.4 31.5 - 36.5 g/dL    RDW 12.1 10.0 - 15.0 %    Platelet Count 283 150 - 450 10e3/uL    % Neutrophils 55 %    % Lymphocytes 36 %    % Monocytes 7 %    % Eosinophils 2 %    % Basophils 1 %    Absolute Neutrophils 4.2 1.6 - 8.3 10e3/uL    Absolute Lymphocytes 2.7 0.8 - 5.3 10e3/uL    Absolute Monocytes 0.5 0.0 - 1.3 10e3/uL    Absolute Eosinophils 0.1 0.0 - 0.7 10e3/uL    Absolute Basophils 0.0 0.0 - 0.2 10e3/uL   Urine Microscopic Exam     Status: Abnormal   Result Value Ref Range    Bacteria Urine Moderate (A) None Seen /HPF    RBC Urine 0-2 0-2 /HPF /HPF    WBC Urine 0-5 0-5 /HPF /HPF    Squamous Epithelials Urine Few (A) None Seen  /LPF   Influenza A & B Antigen - Clinic Collect     Status: Normal    Specimen: Nasopharyngeal; Swab   Result Value Ref Range    Influenza A antigen Negative Negative    Influenza B antigen Negative Negative    Narrative    Test results must be correlated with clinical data. If necessary, results should be confirmed by a molecular assay or viral culture.   Streptococcus A Rapid Screen w/Reflex to PCR - Clinic Collect     Status: Normal    Specimen: Throat; Swab   Result Value Ref Range    Group A Strep antigen Negative Negative   Wet prep - Clinic Collect     Status: Abnormal    Specimen: Vagina; Swab   Result Value Ref Range    Trichomonas Absent Absent    Yeast Absent Absent    Clue Cells Absent Absent    WBCs/high power field 2+ (A) None    Narrative    Many Bacteria Seen   CBC with platelets and differential     Status: None    Narrative    The following orders were created for panel order CBC with platelets and differential.  Procedure                               Abnormality         Status                     ---------                               -----------         ------                     CBC with platelets and d...[[id number]]                      Final result                 Please view results for these tests on the individual orders.        Covid-19 PCR: Patient is pending

## 2022-01-16 LAB
GROUP A STREP BY PCR: NOT DETECTED
SARS-COV-2 RNA RESP QL NAA+PROBE: NEGATIVE

## 2022-01-16 NOTE — PATIENT INSTRUCTIONS
"  Patient Education     Viral Syndrome (Adult)  A viral illness may cause a number of symptoms such as fever. Other symptoms depend on the part of the body that the virus affects. If it settles in your nose, throat, and lungs, it may cause cough, sore throat, congestion, runny nose, headache, earache and other ear symptoms, or shortness of breath. If it settles in your stomach and intestinal tract, it may cause nausea, vomiting, cramping, and diarrhea. Sometimes it causes generalized symptoms like \"aching all over,\" feeling tired, loss of energy, or loss of appetite.  A viral illness usually lasts anywhere from several days to several weeks, but sometimes it lasts longer. In some cases, a more serious infection can look like a viral syndrome in the first few days of the illness. You may need another exam and additional tests to know the difference. Watch for the warning signs listed below for when to seek medical advice.  Home care  Follow these guidelines for taking care of yourself at home:    If symptoms are severe, rest at home for the first 2 to 3 days.    Stay away from cigarette smoke - both your smoke and the smoke from others.    You may use over-the-counter acetaminophen or ibuprofen for fever, muscle aching, and headache, unless another medicine was prescribed for this. If you have chronic liver or kidney disease or ever had a stomach ulcer or gastrointestinal bleeding, talk with your healthcare provider before using these medicines. No one who is younger than 18 and ill with a fever should take aspirin. It may cause severe disease or death.    Your appetite may be poor, so a light diet is fine. Avoid dehydration by drinking 8 to 12, 8-ounce glasses of fluids each day. This may include water; orange juice; lemonade; apple, grape, and cranberry juice; clear fruit drinks; electrolyte replacement and sports drinks; and decaffeinated teas and coffee. If you have been diagnosed with a kidney disease, ask your " healthcare provider how much and what types of fluids you should drink to prevent dehydration. If you have kidney disease, drinking too much fluid can cause it build up in the your body and be dangerous to your health.    Over-the-counter remedies won't shorten the length of the illness but may be helpful for symptoms such as cough, sore throat, nasal and sinus congestion, or diarrhea. Don't use decongestants if you have high blood pressure.  Follow-up care  Follow up with your healthcare provider if you do not improve over the next week.  Call 911  Call 911 if any of the following occur:    Convulsion    Feeling weak, dizzy, or like you are going to faint    Chest pain, or more than mild shortness of breath  When to seek medical advice  Call your healthcare provider right away if any of these occur:    Cough with lots of colored sputum (mucus) or blood in your sputum    Chest pain, shortness of breath, wheezing, or trouble breathing    Severe headache; face, neck, or ear pain    Severe, constant pain in the lower right side of your belly (abdominal)    Continued vomiting (can t keep liquids down)    Frequent diarrhea (more than 5 times a day); blood (red or black color) or mucus in diarrhea    Feeling weak, dizzy, or like you are going to faint    Extreme thirst    Fever of 100.4 F (38 C) or higher, or as directed by your healthcare provider  Dorothy last reviewed this educational content on 4/1/2018 2000-2021 [employer]. All rights reserved. This information is not intended as a substitute for professional medical care. Always follow your healthcare professional's instructions.                     January 15, 2022 [facility]:      At this time, your symptoms appear most likely to be caused by a virus. You may have a non-specific viral illness. It is also possible you could have a Covid-19 viral infection.      A Covid-19 PCR test was done here today.  The result typically comes back  in 1-2 days (watch your MyChart or call the urgent care clinic for your result in 1-2 days).     Your influenza A and B screening was negative here today.     A quick Strep test was done here today (the result was negative/normal). Another Strep PCR test was done here today. Please watch your MyChart for that result (it typically comes back in 1-2 days).     You were also screened for Mono, pregnancy, occult (hidden) bladder infection, vaginal infection,  anemia, and blood count abnormalities. Your test results do not suggest any of the above.     Please continue with home comfort care measures (including as needed Tylenol or Ibuprofen for sore throat and fever) and extra rest and fluids.     Follow-up immediately if you have any sudden, severe, worsening of your symptoms or if you develop any of the below:         Chest pain, shortness of breath, wheezing, or difficulty breathing    Coughing up blood    Very severe pain with swallowing, especially if it goes along with a muffled voice        Please stay home/self-isolate (no contact with others outside of home) until your Covid-19 test result is back (this typically takes 1-2 days), you are without fever for 24 hours (without use of fever reducing medication) and your symptoms are improving.       Please contact your work supervisor regarding their return to work policy when you have acute illness symptoms and you have had a Covid-19 test (with result pending).     I have also advised you follow-up with your primary care provider on Monday if your symptoms persist.     Component      Latest Ref Rng & Units 1/15/2022   WBC      4.0 - 11.0 10e3/uL 7.6   RBC Count      3.80 - 5.20 10e6/uL 4.37   Hemoglobin      11.7 - 15.7 g/dL 12.7   Hematocrit      35.0 - 47.0 % 38.0   MCV      78 - 100 fL 87   MCH      26.5 - 33.0 pg 29.1   MCHC      31.5 - 36.5 g/dL 33.4   RDW      10.0 - 15.0 % 12.1   Platelet Count      150 - 450 10e3/uL 283   % Neutrophils      % 55   %  Lymphocytes      % 36   % Monocytes      % 7   % Eosinophils      % 2   % Basophils      % 1   Absolute Neutrophils      1.6 - 8.3 10e3/uL 4.2   Absolute Lymphocytes      0.8 - 5.3 10e3/uL 2.7   Absolute Monocytes      0.0 - 1.3 10e3/uL 0.5   Absolute Eosinophils      0.0 - 0.7 10e3/uL 0.1   Absolute Basophils      0.0 - 0.2 10e3/uL 0.0   Color Urine      Colorless, Straw, Light Yellow, Yellow Yellow   Appearance Urine      Clear Clear   Glucose Urine      Negative mg/dL Negative   Bilirubin Urine      Negative Negative   Ketones Urine      Negative mg/dL Negative   Specific Gravity Urine      1.003 - 1.035 1.020   Blood Urine      Negative Trace (A)   pH Urine      5.0 - 7.0 6.0   Protein Albumin Urine      Negative mg/dL Negative   Urobilinogen Urine      0.2, 1.0 E.U./dL 1.0   Nitrite Urine      Negative Negative   Leukocyte Esterase Urine      Negative Trace (A)   Trichomonas      Absent Absent   Yeast      Absent Absent   Clue cells      Absent Absent   WBCs/high power field      None 2+ (A)   Bacteria Urine      None Seen /HPF Moderate (A)   RBC Urine      0-2 /HPF /HPF 0-2   WBC Urine      0-5 /HPF /HPF 0-5   Squamous Epithelial /LPF Urine      None Seen /LPF Few (A)   Influenza A      Negative Negative   Influenza B      Negative Negative

## 2022-01-23 DIAGNOSIS — E03.4 HYPOTHYROIDISM DUE TO ACQUIRED ATROPHY OF THYROID: ICD-10-CM

## 2022-01-25 RX ORDER — LEVOTHYROXINE SODIUM 112 UG/1
TABLET ORAL
Qty: 30 TABLET | Refills: 2 | Status: SHIPPED | OUTPATIENT
Start: 2022-01-25 | End: 2022-05-04

## 2022-01-25 NOTE — TELEPHONE ENCOUNTER
Prescription approved per [facility] Refill Protocol.  Evangelista PAIZ RN

## 2022-04-06 ENCOUNTER — TRANSFERRED RECORDS (OUTPATIENT)
Dept: HEALTH INFORMATION MANAGEMENT | Facility: CLINIC | Age: 37
End: 2022-04-06

## 2022-04-06 LAB
HPV ABSTRACT: NORMAL
PAP-ABSTRACT: NORMAL

## 2022-04-18 ENCOUNTER — OFFICE VISIT (OUTPATIENT)
Dept: FAMILY MEDICINE | Facility: CLINIC | Age: 37
End: 2022-04-18
Payer: COMMERCIAL

## 2022-04-18 VITALS
TEMPERATURE: 98 F | HEART RATE: 96 BPM | RESPIRATION RATE: 16 BRPM | BODY MASS INDEX: 30.31 KG/M2 | SYSTOLIC BLOOD PRESSURE: 108 MMHG | WEIGHT: 200 LBS | DIASTOLIC BLOOD PRESSURE: 64 MMHG | HEIGHT: 68 IN | OXYGEN SATURATION: 98 %

## 2022-04-18 DIAGNOSIS — E03.4 HYPOTHYROIDISM DUE TO ACQUIRED ATROPHY OF THYROID: ICD-10-CM

## 2022-04-18 DIAGNOSIS — E66.811 CLASS 1 OBESITY DUE TO EXCESS CALORIES WITHOUT SERIOUS COMORBIDITY WITH BODY MASS INDEX (BMI) OF 30.0 TO 30.9 IN ADULT: Primary | ICD-10-CM

## 2022-04-18 DIAGNOSIS — Z12.4 SCREENING FOR MALIGNANT NEOPLASM OF CERVIX: ICD-10-CM

## 2022-04-18 DIAGNOSIS — E66.09 CLASS 1 OBESITY DUE TO EXCESS CALORIES WITHOUT SERIOUS COMORBIDITY WITH BODY MASS INDEX (BMI) OF 30.0 TO 30.9 IN ADULT: Primary | ICD-10-CM

## 2022-04-18 DIAGNOSIS — E04.1 THYROID NODULE: ICD-10-CM

## 2022-04-18 DIAGNOSIS — R53.83 OTHER FATIGUE: ICD-10-CM

## 2022-04-18 LAB
ERYTHROCYTE [DISTWIDTH] IN BLOOD BY AUTOMATED COUNT: 12.1 % (ref 10–15)
HCT VFR BLD AUTO: 38.2 % (ref 35–47)
HGB BLD-MCNC: 13 G/DL (ref 11.7–15.7)
MCH RBC QN AUTO: 29.6 PG (ref 26.5–33)
MCHC RBC AUTO-ENTMCNC: 34 G/DL (ref 31.5–36.5)
MCV RBC AUTO: 87 FL (ref 78–100)
PLATELET # BLD AUTO: 264 10E3/UL (ref 150–450)
RBC # BLD AUTO: 4.39 10E6/UL (ref 3.8–5.2)
WBC # BLD AUTO: 5.1 10E3/UL (ref 4–11)

## 2022-04-18 PROCEDURE — 82306 VITAMIN D 25 HYDROXY: CPT | Performed by: PHYSICIAN ASSISTANT

## 2022-04-18 PROCEDURE — 84443 ASSAY THYROID STIM HORMONE: CPT | Performed by: PHYSICIAN ASSISTANT

## 2022-04-18 PROCEDURE — 36415 COLL VENOUS BLD VENIPUNCTURE: CPT | Performed by: PHYSICIAN ASSISTANT

## 2022-04-18 PROCEDURE — 80053 COMPREHEN METABOLIC PANEL: CPT | Performed by: PHYSICIAN ASSISTANT

## 2022-04-18 PROCEDURE — 84439 ASSAY OF FREE THYROXINE: CPT | Performed by: PHYSICIAN ASSISTANT

## 2022-04-18 PROCEDURE — 99214 OFFICE O/P EST MOD 30 MIN: CPT | Performed by: PHYSICIAN ASSISTANT

## 2022-04-18 PROCEDURE — 85027 COMPLETE CBC AUTOMATED: CPT | Performed by: PHYSICIAN ASSISTANT

## 2022-04-18 RX ORDER — PHENTERMINE HYDROCHLORIDE 15 MG/1
CAPSULE ORAL EVERY MORNING
COMMUNITY
End: 2022-04-18

## 2022-04-18 RX ORDER — PHENTERMINE HYDROCHLORIDE 15 MG/1
30 CAPSULE ORAL EVERY MORNING
Qty: 60 CAPSULE | Refills: 0 | Status: SHIPPED | OUTPATIENT
Start: 2022-04-18 | End: 2023-05-11

## 2022-04-18 ASSESSMENT — ANXIETY QUESTIONNAIRES
7. FEELING AFRAID AS IF SOMETHING AWFUL MIGHT HAPPEN: SEVERAL DAYS
GAD7 TOTAL SCORE: 4
4. TROUBLE RELAXING: NOT AT ALL
1. FEELING NERVOUS, ANXIOUS, OR ON EDGE: NOT AT ALL
GAD7 TOTAL SCORE: 4
7. FEELING AFRAID AS IF SOMETHING AWFUL MIGHT HAPPEN: SEVERAL DAYS
2. NOT BEING ABLE TO STOP OR CONTROL WORRYING: NOT AT ALL
GAD7 TOTAL SCORE: 4
6. BECOMING EASILY ANNOYED OR IRRITABLE: MORE THAN HALF THE DAYS
5. BEING SO RESTLESS THAT IT IS HARD TO SIT STILL: NOT AT ALL
3. WORRYING TOO MUCH ABOUT DIFFERENT THINGS: SEVERAL DAYS

## 2022-04-18 ASSESSMENT — PAIN SCALES - GENERAL: PAINLEVEL: NO PAIN (0)

## 2022-04-18 ASSESSMENT — PATIENT HEALTH QUESTIONNAIRE - PHQ9
SUM OF ALL RESPONSES TO PHQ QUESTIONS 1-9: 7
SUM OF ALL RESPONSES TO PHQ QUESTIONS 1-9: 7
10. IF YOU CHECKED OFF ANY PROBLEMS, HOW DIFFICULT HAVE THESE PROBLEMS MADE IT FOR YOU TO DO YOUR WORK, TAKE CARE OF THINGS AT HOME, OR GET ALONG WITH OTHER PEOPLE: SOMEWHAT DIFFICULT

## 2022-04-18 NOTE — PROGRESS NOTES
Assessment & Plan     Class 1 obesity due to excess calories without serious comorbidity with body mass index (BMI) of 30.0 to 30.9 in adult  Struggling with weight, appetite. Used phentermine in the past with some success but stopped because she was noticing palpitations and has gained weight since being off of it. She would like to try it again. Normal heart/lung exam and BP. Okay to try again but I encouraged her to follow-up with the weight management clinic to explore other options and she is agreeable.  - Comprehensive Weight Management; Future  - phentermine (ADIPEX-P) 15 MG capsule; Take 2 capsules (30 mg) by mouth every morning    Hypothyroidism due to acquired atrophy of thyroid  Chronic. Sounds like she has been struggling with fatigue and weight issues for quite some time. Also concerned with sweating around her neck at night, irritability, low libido. Will check thyroid and additional labs as below. Will follow-up per results. If thyroid is normal, consider follow-up with endocrinology. Would also consider medication for depression. She did genesight testing in the past and it looks like PCP recommended trying Pristiq or Effexor but she hasn't tried these.  - TSH WITH FREE T4 REFLEX; Future  - TSH WITH FREE T4 REFLEX    Thyroid nodule  Last US was in 2017. Will repeat.  - US Thyroid; Future    Other fatigue  See above. Checking labs today. Consider medication for depression.  - TSH WITH FREE T4 REFLEX; Future  - Comprehensive metabolic panel (BMP + Alb, Alk Phos, ALT, AST, Total. Bili, TP); Future  - CBC with platelets; Future  - Vitamin D Deficiency; Future  - CBC with platelets  - Vitamin D Deficiency    Screening for malignant neoplasm of cervix  She had pap done with OB/GYN a few weeks ago, reports results were normal. GLORY completed today.      Return in about 3 months (around 7/18/2022) for Preventive Physical Exam with PCP.    Loan Cole PA-C  [facility]  "HUMBERTO Blanco is a 36 year old who presents for the following health issues     History of Present Illness       Mental Health Follow-up:  Patient presents to follow-up on Depression & Anxiety.Patient's depression since last visit has been:  Better  The patient is having other symptoms associated with depression.  Patient's anxiety since last visit has been:  No change  The patient is not having other symptoms associated with anxiety.  Any significant life events: No  Patient is not feeling anxious or having panic attacks.  Patient has no concerns about alcohol or drug use.       Today's PHQ-9         PHQ-9 Total Score: 7  PHQ-9 Q9 Thoughts of better off dead/self-harm past 2 weeks :   (P) Not at all    How difficult have these problems made it for you to do your work, take care of things at home, or get along with other people: Somewhat difficult    Today's DESIREE-7 Score: 4    Hypothyroidism:     Since last visit, patient describes the following symptoms::  Anxiety, Depression, Dry skin, Fatigue and Weight gain    Weight gain::  >20 lbs.    She eats 2-3 servings of fruits and vegetables daily.She consumes 1 sweetened beverage(s) daily.She exercises with enough effort to increase her heart rate 20 to 29 minutes per day.  She exercises with enough effort to increase her heart rate 3 or less days per week.   She is taking medications regularly.    History of anxiety/depression. Had Genesight testing done in 2019. Per note from PCP from 10/7/19 clinic visit, \"She has not been able to find good results with many ssri type medications over the years for depression.  Fatigue is also a main complaint of hers.  She does not stay long on various medications we have tried.  Wellbutrin seems to be the one she sticks with longest and is on currently. We reviewed [employer] results- Pristiq/Effexor both options for mood.  Would recommend trying one of these next.\" She stopped taking wellbutrin a few years ago and " "is not currently taking anything for mood. Not interested in medication for mood currently. She says that phentermine has helped with mood in the past more than any other medication she has tried.    PHQ 7/29/2020 11/27/2020 4/18/2022   PHQ-9 Total Score 2 2 7   Q9: Thoughts of better off dead/self-harm past 2 weeks Not at all Not at all Not at all     DESIREE-7 SCORE 10/7/2019 11/27/2020 4/18/2022   Total Score 6 (mild anxiety) - 4 (minimal anxiety)   Total Score 6 3 4       Weight concern: she has been on and off phentermine over the past few years. Initially had good success with it but seemed like the effects wore off after a while and she had palpitations with it. She stopped taking it in June 2021 and has since gained about 25 pounds. She is a \"self proclaimed food addict\" and says that without an appetite suppressant she constantly thinks about food. She started taking 15 mg phentermine that she had left over about 3 weeks ago. Has not noticed a big difference with appetite since starting it, but she was on 30 mg in the past and would like to try this. She has also tried contrave in the past for weight but did not like that medication.    Concern with thyroid. Has not had labs checked in past year. Overall, wondering if thyroid could be off. Has been noticing fatigue, irritability, forgetfulness, weight gain, night sweats (sweating around her neck at night), low libido. She has a history of thyroid nodule, last had thyroid US in 2017. At that point, was seeing endocrinology and they recommended repeating in 1 year. She has not had repeat US since 2017.    Review of Systems   Constitutional, HEENT, cardiovascular, pulmonary, gi and gu systems are negative, except as otherwise noted.      Objective    /64 (BP Location: Right arm, Patient Position: Sitting, Cuff Size: Adult Regular)   Pulse 96   Temp 98  F (36.7  C) (Oral)   Resp 16   Ht 1.727 m (5' 8\")   Wt 90.7 kg (200 lb)   LMP 04/06/2022   SpO2 98%  "  Breastfeeding No   BMI 30.41 kg/m    Body mass index is 30.41 kg/m .  Physical Exam   GENERAL: healthy, alert and no distress  NECK: no adenopathy, no asymmetry, masses, or scars and thyroid normal to palpation  RESP: lungs clear to auscultation - no rales, rhonchi or wheezes  CV: regular rate and rhythm  NEURO: Normal strength and tone, mentation intact and speech normal  PSYCH: mentation appears normal, affect normal/bright and anxious, briefly tearful    Results for orders placed or performed in visit on 04/18/22 (from the past 24 hour(s))   CBC with platelets   Result Value Ref Range    WBC Count 5.1 4.0 - 11.0 10e3/uL    RBC Count 4.39 3.80 - 5.20 10e6/uL    Hemoglobin 13.0 11.7 - 15.7 g/dL    Hematocrit 38.2 35.0 - 47.0 %    MCV 87 78 - 100 fL    MCH 29.6 26.5 - 33.0 pg    MCHC 34.0 31.5 - 36.5 g/dL    RDW 12.1 10.0 - 15.0 %    Platelet Count 264 150 - 450 10e3/uL

## 2022-04-19 LAB
ALBUMIN SERPL-MCNC: 4.3 G/DL (ref 3.4–5)
ALP SERPL-CCNC: 70 U/L (ref 40–150)
ALT SERPL W P-5'-P-CCNC: 28 U/L (ref 0–50)
ANION GAP SERPL CALCULATED.3IONS-SCNC: 6 MMOL/L (ref 3–14)
AST SERPL W P-5'-P-CCNC: 23 U/L (ref 0–45)
BILIRUB SERPL-MCNC: 0.4 MG/DL (ref 0.2–1.3)
BUN SERPL-MCNC: 12 MG/DL (ref 7–30)
CALCIUM SERPL-MCNC: 9.1 MG/DL (ref 8.5–10.1)
CHLORIDE BLD-SCNC: 106 MMOL/L (ref 94–109)
CO2 SERPL-SCNC: 26 MMOL/L (ref 20–32)
CREAT SERPL-MCNC: 0.81 MG/DL (ref 0.52–1.04)
GFR SERPL CREATININE-BSD FRML MDRD: >90 ML/MIN/1.73M2
GLUCOSE BLD-MCNC: 86 MG/DL (ref 70–99)
POTASSIUM BLD-SCNC: 4 MMOL/L (ref 3.4–5.3)
PROT SERPL-MCNC: 7.7 G/DL (ref 6.8–8.8)
SODIUM SERPL-SCNC: 138 MMOL/L (ref 133–144)
T4 FREE SERPL-MCNC: 1.22 NG/DL (ref 0.76–1.46)
TSH SERPL DL<=0.005 MIU/L-ACNC: 0.25 MU/L (ref 0.4–4)

## 2022-04-19 ASSESSMENT — PATIENT HEALTH QUESTIONNAIRE - PHQ9: SUM OF ALL RESPONSES TO PHQ QUESTIONS 1-9: 7

## 2022-04-19 ASSESSMENT — ANXIETY QUESTIONNAIRES: GAD7 TOTAL SCORE: 4

## 2022-04-20 LAB — DEPRECATED CALCIDIOL+CALCIFEROL SERPL-MC: 22 UG/L (ref 20–75)

## 2022-04-22 ENCOUNTER — HOSPITAL ENCOUNTER (OUTPATIENT)
Dept: ULTRASOUND IMAGING | Facility: CLINIC | Age: 37
Discharge: HOME | End: 2022-04-22
Attending: PHYSICIAN ASSISTANT | Admitting: PHYSICIAN ASSISTANT
Payer: COMMERCIAL

## 2022-04-22 DIAGNOSIS — E04.1 THYROID NODULE: ICD-10-CM

## 2022-04-22 DIAGNOSIS — E03.4 HYPOTHYROIDISM DUE TO ACQUIRED ATROPHY OF THYROID: Primary | ICD-10-CM

## 2022-04-22 PROCEDURE — 76536 US EXAM OF HEAD AND NECK: CPT

## 2022-05-02 DIAGNOSIS — E03.4 HYPOTHYROIDISM DUE TO ACQUIRED ATROPHY OF THYROID: ICD-10-CM

## 2022-05-03 NOTE — TELEPHONE ENCOUNTER
Routing refill request to provider for review/approval because:  Labs out of range:  TSH  TSH   Date Value Ref Range Status   04/18/2022 0.25 (L) 0.40 - 4.00 mU/L Final   03/24/2021 1.18 0.40 - 4.00 mU/L Final     Kathryn Mcdaniels RN, BSN  [facility]

## 2022-05-04 RX ORDER — LEVOTHYROXINE SODIUM 112 UG/1
TABLET ORAL
Qty: 30 TABLET | Refills: 1 | Status: SHIPPED | OUTPATIENT
Start: 2022-05-04 | End: 2022-06-05

## 2022-05-04 NOTE — TELEPHONE ENCOUNTER
Loan- you just saw Bella- do you want to fill (or deny/adjust) this based on recent visit and labs?    THanks!  Osmar

## 2022-05-27 DIAGNOSIS — E03.4 HYPOTHYROIDISM DUE TO ACQUIRED ATROPHY OF THYROID: ICD-10-CM

## 2022-05-27 NOTE — LETTER
June 20, 2022      Bella Scott  [address]  [place] [address]        Michele Blanco,     We recently received a call from your pharmacy requesting a refill of your medication levothyroxine. We are contacting you today to notify you that you are due for a LAB ONLYY for further refills.     We have authorized a one time refill of your medication to allow time for you to schedule an appointment.    Please call [phone] to schedule an appointment or if you have MyChart you can schedule with your provider as well.     Taking care of your health is important to us, and ongoing visits with your provider are vital to your care. We look forward to seeing you in the near future.     Thank you for using [facility] for your medical needs.       Sincerely,        Loan Cole PA-C

## 2022-05-31 NOTE — TELEPHONE ENCOUNTER
Routing refill request to provider for review/approval because:   Thyroid Protocol Failed 05/27/2022 11:31 AM   Protocol Details  Normal TSH on file in past 12 months        TSH   Date Value Ref Range Status   04/18/2022 0.25 (L) 0.40 - 4.00 mU/L Final   03/24/2021 1.18 0.40 - 4.00 mU/L Final        Lynda Avilez RN

## 2022-06-05 RX ORDER — LEVOTHYROXINE SODIUM 112 UG/1
TABLET ORAL
Qty: 30 TABLET | Refills: 0 | Status: SHIPPED | OUTPATIENT
Start: 2022-06-05 | End: 2022-07-07

## 2022-06-08 ENCOUNTER — OFFICE VISIT (OUTPATIENT)
Dept: PEDIATRICS | Facility: CLINIC | Age: 37
End: 2022-06-08
Payer: COMMERCIAL

## 2022-06-08 VITALS
BODY MASS INDEX: 30.41 KG/M2 | DIASTOLIC BLOOD PRESSURE: 64 MMHG | OXYGEN SATURATION: 99 % | TEMPERATURE: 98.9 F | HEART RATE: 54 BPM | WEIGHT: 200 LBS | SYSTOLIC BLOOD PRESSURE: 108 MMHG | RESPIRATION RATE: 16 BRPM

## 2022-06-08 DIAGNOSIS — N64.52 DISCHARGE FROM BOTH NIPPLES: Primary | ICD-10-CM

## 2022-06-08 PROCEDURE — 99213 OFFICE O/P EST LOW 20 MIN: CPT | Performed by: NURSE PRACTITIONER

## 2022-06-08 ASSESSMENT — PAIN SCALES - GENERAL: PAINLEVEL: NO PAIN (0)

## 2022-06-08 NOTE — PROGRESS NOTES
Assessment & Plan     Discharge from both nipples  Fibrocystic breast tissue on exam but no dominant masses or nipple discharge. Suspected to be hormonal, however will obtain diagnostic mammo/US on bilateral breasts.   - US Breast Right Limited 1-3 Quadrants; Future  - US Breast Left Limited 1-3 Quadrants; Future      20 minutes spent on the date of the encounter doing chart review, patient visit and documentation          FURTHER TESTING:       - mammogram and US to bilateral breasts    Follow-up: Imaging results pending, will follow-up as indicated after reviewing results.     CECILIA Dash [facility] TOYA Blanco is a 36 year old who presents for the following health issues     HPI     Presents reporting nipple discharge.     First noticed nipple discharge 2 months ago. Appeared as white crusting from both nipples. Upon squeezing the nipples, she was able to express white discharge out of both nipples. She didn't notice any discharge until today when her breasts started to feel funny. She squeezed both nipples again and some colostrum appearing discharge came out of the right breast.     Has two children, the youngest is 5 years old. She has not breast fed in 4 years.     IUD in place, doesn't ususally get her period but occasionlly has cramping or sore breasts, sometimes has light spotting.     Denies family history of breast cancer.     Patient Active Problem List   Diagnosis     Contraception     Thyroid enlarged     Hypothyroidism     CARDIOVASCULAR SCREENING; LDL GOAL LESS THAN 160     Hypercholesteremia     Thyroid nodule     Multinodular goiter     Ovarian cyst     Indication for care in labor or delivery     Mild major depression (H)     S/P repeat low transverse      Obesity     Pain in the coccyx     Somatic dysfunction of lumbar region     Chronic bilateral low back pain without sciatica     Chronic right-sided low back pain without sciatica      Somatic dysfunction of sacral region     Sacral pain     Fibrocystic changes of left breast     Past Medical History:   Diagnosis Date     Anemia, unspecified 3/24/2003    iron deficiney per pt     Contraception      Unspecified hypothyroidism      Current Outpatient Medications   Medication     Cholecalciferol (VITAMIN D3 GUMMIES ADULT PO)     levonorgestrel (MIRENA) 20 MCG/24HR IUD     levothyroxine (SYNTHROID/LEVOTHROID) 112 MCG tablet     phentermine (ADIPEX-P) 15 MG capsule     Prenatal Vit-Fe Fumarate-FA (PRENATABS FA) TABS     No current facility-administered medications for this visit.        Allergies   Allergen Reactions     No Known Allergies          Review of Systems    ROS: 10 point ROS neg other than the symptoms noted above in the HPI.        Objective    /64 (Cuff Size: Adult Regular)   Pulse 54   Temp 98.9  F (37.2  C) (Tympanic)   Resp 16   Wt 90.7 kg (200 lb)   SpO2 99%   BMI 30.41 kg/m    Body mass index is 30.41 kg/m .  Physical Exam   Constitutional: appears to be in no acute distress, comfortable, pleasant.   Eyes: anicteric, conjunctiva clear without drainage or erythema. BETSY.   Breast: No nipple abnormality or discharge, no dominant masses, tenderness to palpation, axillary, supraclavicular, or infraclavicular adenopathy.  Cardiovascular: regular rate.   Respiratory: Breathing pattern unlabored without the use of accessory muscles.   Musculoskeletal: full range of motion, no edema.   Neurological: normal gait, no tremor.   Psychological: appropriate mood and affect.

## 2022-06-25 ENCOUNTER — HEALTH MAINTENANCE LETTER (OUTPATIENT)
Age: 37
End: 2022-06-25

## 2022-07-06 ENCOUNTER — LAB (OUTPATIENT)
Dept: LAB | Facility: CLINIC | Age: 37
End: 2022-07-06
Payer: COMMERCIAL

## 2022-07-06 DIAGNOSIS — E03.4 HYPOTHYROIDISM DUE TO ACQUIRED ATROPHY OF THYROID: ICD-10-CM

## 2022-07-06 PROCEDURE — 84443 ASSAY THYROID STIM HORMONE: CPT

## 2022-07-06 PROCEDURE — 36415 COLL VENOUS BLD VENIPUNCTURE: CPT

## 2022-07-07 DIAGNOSIS — E03.4 HYPOTHYROIDISM DUE TO ACQUIRED ATROPHY OF THYROID: ICD-10-CM

## 2022-07-07 LAB — TSH SERPL DL<=0.005 MIU/L-ACNC: 0.48 MU/L (ref 0.4–4)

## 2022-07-07 RX ORDER — LEVOTHYROXINE SODIUM 112 UG/1
112 TABLET ORAL DAILY
Qty: 90 TABLET | Refills: 3 | Status: SHIPPED | OUTPATIENT
Start: 2022-07-07 | End: 2023-06-22

## 2022-08-03 ENCOUNTER — HOSPITAL ENCOUNTER (OUTPATIENT)
Dept: ULTRASOUND IMAGING | Facility: CLINIC | Age: 37
Discharge: HOME | End: 2022-08-03
Attending: NURSE PRACTITIONER
Payer: COMMERCIAL

## 2022-08-03 ENCOUNTER — HOSPITAL ENCOUNTER (OUTPATIENT)
Dept: MAMMOGRAPHY | Facility: CLINIC | Age: 37
Discharge: HOME | End: 2022-08-03
Attending: NURSE PRACTITIONER
Payer: COMMERCIAL

## 2022-08-03 DIAGNOSIS — N64.52 DISCHARGE FROM BOTH NIPPLES: ICD-10-CM

## 2022-08-03 PROCEDURE — 77062 BREAST TOMOSYNTHESIS BI: CPT

## 2022-08-03 PROCEDURE — 76642 ULTRASOUND BREAST LIMITED: CPT | Mod: LT

## 2022-11-20 ENCOUNTER — HEALTH MAINTENANCE LETTER (OUTPATIENT)
Age: 37
End: 2022-11-20

## 2022-12-29 ENCOUNTER — OFFICE VISIT (OUTPATIENT)
Dept: URGENT CARE | Facility: URGENT CARE | Age: 37
End: 2022-12-29
Payer: COMMERCIAL

## 2022-12-29 VITALS
HEART RATE: 72 BPM | OXYGEN SATURATION: 100 % | TEMPERATURE: 98.4 F | RESPIRATION RATE: 18 BRPM | SYSTOLIC BLOOD PRESSURE: 127 MMHG | WEIGHT: 203 LBS | DIASTOLIC BLOOD PRESSURE: 87 MMHG | BODY MASS INDEX: 30.87 KG/M2

## 2022-12-29 DIAGNOSIS — M62.830 BACK MUSCLE SPASM: Primary | ICD-10-CM

## 2022-12-29 PROCEDURE — 99213 OFFICE O/P EST LOW 20 MIN: CPT | Performed by: FAMILY MEDICINE

## 2022-12-29 RX ORDER — METHOCARBAMOL 500 MG/1
500 TABLET, FILM COATED ORAL 4 TIMES DAILY PRN
Qty: 30 TABLET | Refills: 0 | Status: SHIPPED | OUTPATIENT
Start: 2022-12-29 | End: 2023-05-11

## 2022-12-29 ASSESSMENT — PAIN SCALES - GENERAL: PAINLEVEL: SEVERE PAIN (7)

## 2022-12-29 NOTE — LETTER
December 29, 2022      Bella Scott  [address]  [place] [address]        To Whom It May Concern:    Bella Scott  was seen on 12/29.  Please excuse her from work 12/30/2022 -1/1/2023 due to back injury.        Sincerely,        Kwabena Hoskins MD

## 2022-12-29 NOTE — PROGRESS NOTES
SUBJECTIVE:  Chief Complaint   Patient presents with     Urgent Care     Back Pain     Patient is here for mid right-side back pain resulting from a fall she had (5 days ago.)     Bella Scott is a 37 year old female presents with a chief complaint of back pain, s/p fall injury.    Accidentally fell while twirling, slipped on floor, landed backwards, hitting flat on her back and her head.  This was on 12/24.    No LOC.  Had to lay on floor for a while and was able to finally get up.  Tried taking tylenol and ibuprofen for discomfort.  Endorsed neck pain also.  Has applied heat to area.     Denies any pain in spine or radiation down legs, has had chronic right sided lower back pain for many years but current location of pain is higher up.    Works at [employer] and is suppose to work this weekend, concerned as certain movements has caused her back muscle to spasm    Past Medical History:   Diagnosis Date     Anemia, unspecified 3/24/2003    iron deficiney per pt     Contraception      Unspecified hypothyroidism      Current Outpatient Medications   Medication Sig Dispense Refill     Cholecalciferol (VITAMIN D3 GUMMIES ADULT PO)        levonorgestrel (MIRENA) 20 MCG/24HR IUD 1 each by Intrauterine route once       levothyroxine (SYNTHROID/LEVOTHROID) 112 MCG tablet Take 1 tablet (112 mcg) by mouth daily 90 tablet 3     phentermine (ADIPEX-P) 15 MG capsule Take 2 capsules (30 mg) by mouth every morning 60 capsule 0     Prenatal Vit-Fe Fumarate-FA (PRENATABS FA) TABS Take  by mouth. (Patient not taking: Reported on 4/18/2022)       Social History     Tobacco Use     Smoking status: Never     Smokeless tobacco: Never     Tobacco comments:     no 2nd hand smoke at home   Substance Use Topics     Alcohol use: Yes     Alcohol/week: 0.0 standard drinks     Comment: once a month       ROS:  Review of systems negative except as stated above.    EXAM:   /87 (BP Location: Left arm, Patient Position: Sitting, Cuff Size: Adult  Regular)   Pulse 72   Temp 98.4  F (36.9  C) (Oral)   Resp 18   Wt 92.1 kg (203 lb)   SpO2 100%   BMI 30.87 kg/m    Gen: healthy,alert,no distress  BACK: mild tenderness on mid-thoracic and upper paraspinal muscles, bilateral trapezius muscle tenderness.  No chest wall tenderness  SKIN: no suspicious lesions or rashes  PSYCH:alert, affect bright    X-RAY was not done.    ASSESSMENT/PLAN:   (M62.830) Back muscle spasm  (primary encounter diagnosis)  Plan: methocarbamol (ROBAXIN) 500 MG tablet            Reviewed tylenol and ibuprofen for discomfort, heat to area of discomfort.  RX robaxin given to help with back and neck muscle spasm, caution that medication will cause drowsiness.  Encourage gentle back stretches.    Work excuse note given to be off work 12/30-1/1  Follow up with primary provider if no improvement of symptoms in 1 week to consider physical therapy or further evaluation    Kwabena Hoskins MD  December 29, 2022 1:15 PM

## 2023-03-03 ENCOUNTER — HOSPITAL ENCOUNTER (OUTPATIENT)
Dept: ULTRASOUND IMAGING | Facility: CLINIC | Age: 38
Discharge: HOME | End: 2023-03-03
Attending: NURSE PRACTITIONER | Admitting: NURSE PRACTITIONER
Payer: COMMERCIAL

## 2023-03-03 DIAGNOSIS — R92.8 BI-RADS CATEGORY 3 MAMMOGRAM RESULT: ICD-10-CM

## 2023-03-03 PROCEDURE — 76642 ULTRASOUND BREAST LIMITED: CPT | Mod: LT

## 2023-05-06 ENCOUNTER — APPOINTMENT (OUTPATIENT)
Dept: CT IMAGING | Facility: CLINIC | Age: 38
End: 2023-05-06
Attending: PHYSICIAN ASSISTANT
Payer: COMMERCIAL

## 2023-05-06 ENCOUNTER — HOSPITAL ENCOUNTER (EMERGENCY)
Facility: CLINIC | Age: 38
Discharge: HOME | End: 2023-05-06
Attending: PHYSICIAN ASSISTANT | Admitting: PHYSICIAN ASSISTANT
Payer: COMMERCIAL

## 2023-05-06 VITALS
DIASTOLIC BLOOD PRESSURE: 87 MMHG | RESPIRATION RATE: 16 BRPM | HEART RATE: 65 BPM | OXYGEN SATURATION: 97 % | SYSTOLIC BLOOD PRESSURE: 117 MMHG | TEMPERATURE: 98.3 F

## 2023-05-06 DIAGNOSIS — R51.9 HEADACHE: ICD-10-CM

## 2023-05-06 LAB
ANION GAP SERPL CALCULATED.3IONS-SCNC: 11 MMOL/L (ref 7–15)
BASOPHILS # BLD AUTO: 0.1 10E3/UL (ref 0–0.2)
BASOPHILS NFR BLD AUTO: 1 %
BUN SERPL-MCNC: 11.8 MG/DL (ref 6–20)
CALCIUM SERPL-MCNC: 9.2 MG/DL (ref 8.6–10)
CHLORIDE SERPL-SCNC: 104 MMOL/L (ref 98–107)
CREAT SERPL-MCNC: 0.69 MG/DL (ref 0.51–0.95)
DEPRECATED HCO3 PLAS-SCNC: 24 MMOL/L (ref 22–29)
EOSINOPHIL # BLD AUTO: 0.1 10E3/UL (ref 0–0.7)
EOSINOPHIL NFR BLD AUTO: 2 %
ERYTHROCYTE [DISTWIDTH] IN BLOOD BY AUTOMATED COUNT: 11.9 % (ref 10–15)
GFR SERPL CREATININE-BSD FRML MDRD: >90 ML/MIN/1.73M2
GLUCOSE SERPL-MCNC: 95 MG/DL (ref 70–99)
HCG SER QL IA.RAPID: NEGATIVE
HCT VFR BLD AUTO: 39.2 % (ref 35–47)
HGB BLD-MCNC: 13.3 G/DL (ref 11.7–15.7)
HOLD SPECIMEN: NORMAL
HOLD SPECIMEN: NORMAL
IMM GRANULOCYTES # BLD: 0 10E3/UL
IMM GRANULOCYTES NFR BLD: 1 %
LYMPHOCYTES # BLD AUTO: 2.3 10E3/UL (ref 0.8–5.3)
LYMPHOCYTES NFR BLD AUTO: 38 %
MCH RBC QN AUTO: 29.5 PG (ref 26.5–33)
MCHC RBC AUTO-ENTMCNC: 33.9 G/DL (ref 31.5–36.5)
MCV RBC AUTO: 87 FL (ref 78–100)
MONOCYTES # BLD AUTO: 0.5 10E3/UL (ref 0–1.3)
MONOCYTES NFR BLD AUTO: 8 %
NEUTROPHILS # BLD AUTO: 3 10E3/UL (ref 1.6–8.3)
NEUTROPHILS NFR BLD AUTO: 50 %
NRBC # BLD AUTO: 0 10E3/UL
NRBC BLD AUTO-RTO: 0 /100
PLATELET # BLD AUTO: 287 10E3/UL (ref 150–450)
POTASSIUM SERPL-SCNC: 4 MMOL/L (ref 3.4–5.3)
RBC # BLD AUTO: 4.51 10E6/UL (ref 3.8–5.2)
SODIUM SERPL-SCNC: 139 MMOL/L (ref 136–145)
WBC # BLD AUTO: 6 10E3/UL (ref 4–11)

## 2023-05-06 PROCEDURE — 96375 TX/PRO/DX INJ NEW DRUG ADDON: CPT

## 2023-05-06 PROCEDURE — 36415 COLL VENOUS BLD VENIPUNCTURE: CPT | Performed by: PHYSICIAN ASSISTANT

## 2023-05-06 PROCEDURE — 82310 ASSAY OF CALCIUM: CPT | Performed by: PHYSICIAN ASSISTANT

## 2023-05-06 PROCEDURE — 250N000011 HC RX IP 250 OP 636: Performed by: PHYSICIAN ASSISTANT

## 2023-05-06 PROCEDURE — 96374 THER/PROPH/DIAG INJ IV PUSH: CPT | Mod: 59

## 2023-05-06 PROCEDURE — 96361 HYDRATE IV INFUSION ADD-ON: CPT | Mod: 59

## 2023-05-06 PROCEDURE — 85014 HEMATOCRIT: CPT | Performed by: PHYSICIAN ASSISTANT

## 2023-05-06 PROCEDURE — 70496 CT ANGIOGRAPHY HEAD: CPT

## 2023-05-06 PROCEDURE — 70498 CT ANGIOGRAPHY NECK: CPT

## 2023-05-06 PROCEDURE — 64450 NJX AA&/STRD OTHER PN/BRANCH: CPT

## 2023-05-06 PROCEDURE — 70450 CT HEAD/BRAIN W/O DYE: CPT | Mod: XU

## 2023-05-06 PROCEDURE — 99285 EMERGENCY DEPT VISIT HI MDM: CPT | Mod: 25

## 2023-05-06 PROCEDURE — 84703 CHORIONIC GONADOTROPIN ASSAY: CPT

## 2023-05-06 PROCEDURE — 250N000009 HC RX 250: Performed by: PHYSICIAN ASSISTANT

## 2023-05-06 PROCEDURE — 258N000003 HC RX IP 258 OP 636: Performed by: PHYSICIAN ASSISTANT

## 2023-05-06 RX ORDER — BUPIVACAINE HYDROCHLORIDE 5 MG/ML
INJECTION, SOLUTION EPIDURAL; INTRACAUDAL
Status: DISCONTINUED
Start: 2023-05-06 | End: 2023-05-06 | Stop reason: HOSPADM

## 2023-05-06 RX ORDER — KETOROLAC TROMETHAMINE 15 MG/ML
15 INJECTION, SOLUTION INTRAMUSCULAR; INTRAVENOUS ONCE
Status: COMPLETED | OUTPATIENT
Start: 2023-05-06 | End: 2023-05-06

## 2023-05-06 RX ORDER — DEXAMETHASONE SODIUM PHOSPHATE 10 MG/ML
10 INJECTION, SOLUTION INTRAMUSCULAR; INTRAVENOUS ONCE
Status: COMPLETED | OUTPATIENT
Start: 2023-05-06 | End: 2023-05-06

## 2023-05-06 RX ORDER — IOPAMIDOL 755 MG/ML
500 INJECTION, SOLUTION INTRAVASCULAR ONCE
Status: COMPLETED | OUTPATIENT
Start: 2023-05-06 | End: 2023-05-06

## 2023-05-06 RX ORDER — METOCLOPRAMIDE HYDROCHLORIDE 5 MG/ML
5 INJECTION INTRAMUSCULAR; INTRAVENOUS ONCE
Status: COMPLETED | OUTPATIENT
Start: 2023-05-06 | End: 2023-05-06

## 2023-05-06 RX ADMIN — DEXAMETHASONE SODIUM PHOSPHATE 10 MG: 10 INJECTION, SOLUTION INTRAMUSCULAR; INTRAVENOUS at 18:02

## 2023-05-06 RX ADMIN — SODIUM CHLORIDE 1000 ML: 9 INJECTION, SOLUTION INTRAVENOUS at 15:51

## 2023-05-06 RX ADMIN — METOCLOPRAMIDE HYDROCHLORIDE 5 MG: 5 INJECTION INTRAMUSCULAR; INTRAVENOUS at 18:06

## 2023-05-06 RX ADMIN — IOPAMIDOL 75 ML: 755 INJECTION, SOLUTION INTRAVENOUS at 16:13

## 2023-05-06 RX ADMIN — KETOROLAC TROMETHAMINE 15 MG: 15 INJECTION, SOLUTION INTRAMUSCULAR; INTRAVENOUS at 18:00

## 2023-05-06 RX ADMIN — SODIUM CHLORIDE 80 ML: 9 INJECTION, SOLUTION INTRAVENOUS at 16:14

## 2023-05-06 ASSESSMENT — ENCOUNTER SYMPTOMS
WEAKNESS: 0
DIZZINESS: 0
HEADACHES: 1
FEVER: 0
NUMBNESS: 0

## 2023-05-06 ASSESSMENT — ACTIVITIES OF DAILY LIVING (ADL)
ADLS_ACUITY_SCORE: 37

## 2023-05-06 NOTE — ED PROVIDER NOTES
Sign Out Note     Pt accepted in sign out from: Franco Murdock PA-C    Briefly pt presented to the ED for:  37-year-old female who presented with headache, that she describes as lightning bolt sensation involving the right posterior head, radiating to the right neck.     Plan at time of sign out:  Reevaluate after IV medications.     Care of patient during my shift:  On recheck, the patient states the lightening bolt sensation involving the right side of her head has resolved, though she still has a mild headache.  Given location and character of headache, concern for cubital neuralgia.  I offer the patient trigger point injection to assist with further deviation of symptoms, which she accepted.    Procedure Note:  Trigger point injection  Indication -headache  To the left of C6, the paraspinous muscles, area cleaned with alcohol swab.  3cc of 0.5% bupivacaine injected in muscle.   Pt tolerated well.  Band-aid applied.     Plan for patient at this time:  Discharged home.  Patient has follow-up with PCP later this week, which she plans to keep.     MD Maritza Desai, Ophelia Alvarez MD  05/07/23 022

## 2023-05-06 NOTE — ED TRIAGE NOTES
"     Pt presents to ED with c/o headache x3 days. Pt states that she developed a headache a week ago that came on like a \"lightning bolt.\" Pt states that this headache subsided on its own. 3 days ago developed a similar headache. Pt reports that she has a hx of migraines and headaches but this feels different. Pt states that she has been having intermittent nausea and some mild blurred vision.   "

## 2023-05-06 NOTE — ED PROVIDER NOTES
History     Chief Complaint:  Headache     HPI   Bella Scott is a 37 year old female who presents with headaches. The patient had a lightning bolt sudden onset headache that came on a week ago, eventually subsiding on its own. She has had a similar headache for the past three days that has been constant, but denies lightning bolt sudden onset. She denies numbness or weakness in extremities.  She had some dizziness and blurred vision yesterday but this is gone no difficulty ambulating and no vision changes at present.  Did not have any double vision or visual field cuts with the episode. She denies, rash, neck stiffness, ringing in the ears, headaches or fevers. She denies sick contacts. She is not currently on her period. She denies history of DVT/PE.  Patient does have a history of migraines but always around her menstrual cycle and they have not felt like this lightning bolts sudden onset previously prompting her presentation here. No one around her is having similar sxs.    Review of External Notes:     ROS:  Review of Systems   Constitutional: Negative for fever.   Neurological: Positive for headaches. Negative for dizziness, weakness and numbness.   All other systems reviewed and are negative.    Allergies:  No Known Allergies     Medications:    levonorgestrel   levothyroxine   methocarbamol   phentermine     Past Medical History:    Anemia  Hypothyroidism  Enlarged thyroid  Ovarian cyst  Depression    Past Surgical History:      Benign skin lesion removal  Nailbed repair     Family History:    family history includes Cancer in her maternal grandmother; Diabetes in her maternal grandfather; Family History Negative in her brother and father; Heart Disease in her paternal grandfather; Other - See Comments in her paternal grandmother; Thyroid Disease in her mother and sister.    Social History:   reports that she has never smoked. She has never used smokeless tobacco. She reports current alcohol  use. She reports that she does not use drugs.  PCP: Osmar Rendon     Physical Exam     Patient Vitals for the past 24 hrs:   BP Temp Pulse Resp SpO2   05/06/23 1800 122/84 -- 68 16 98 %   05/06/23 1715 127/79 -- 76 -- 97 %   05/06/23 1600 122/83 -- 65 16 99 %   05/06/23 1530 136/87 -- 76 16 97 %   05/06/23 1500 (!) 136/95 -- 79 -- 98 %   05/06/23 1452 -- 98.3  F (36.8  C) -- 16 98 %   05/06/23 1444 (!) 148/99 -- 81 -- 99 %      Physical Exam  General: Awake, alert, non-toxic.  Head:  Scalp is NC/AT  Eyes:  Conjunctiva normal, PERRL. EOMI  ENT:  The external nose and ears are normal.     Oropharynx clear, uvula midline.  Neck:  Normal range of motion without rigidity.  CV:  Regular rate and rhythm    No pathologic murmur, rubs, or gallops.  Resp:  Breath sounds are clear bilaterally    Non-labored, no retractions or accessory muscle use  Abdomen: Abdomen is soft, no distension, no tenderness, no masses  MS:  No lower extremity edema/swelling. No midline cervical, thoracic, or lumbar tenderness.   Skin:  Warm and dry, No rash or lesions noted.  Neuro: Alert and oriented.  GCS 15 CNII-XII intact. 5/5 strength BL to arms and legs.  Normal sensation in all extremities to touch.  Normal finger to nose testing BL. Gait normal.  Psych:  Awake. Alert. Normal affect. Appropriate interactions.      Emergency Department Course   Imaging:  CTA Head Neck with Contrast   Final Result   IMPRESSION:    HEAD CT:   1.  No CT finding of a mass, hemorrhage or focal area suggestive of acute infarct.      HEAD CTA:    1.  No discrete vessel occlusion, significant stenosis, aneurysm or high flow vascular malformation involving the arteries of the [place].      NECK CTA:   1.  Normal configuration of the great vessels off the aortic arch with no significant stenosis of their origins.   2.  No significant stenosis or irregularity involving the arteries of the neck by NASCET criteria.   3.  No radiographic evidence of  dissection.      Head CT w/o contrast   Final Result   IMPRESSION:    HEAD CT:   1.  No CT finding of a mass, hemorrhage or focal area suggestive of acute infarct.      HEAD CTA:    1.  No discrete vessel occlusion, significant stenosis, aneurysm or high flow vascular malformation involving the arteries of the [place].      NECK CTA:   1.  Normal configuration of the great vessels off the aortic arch with no significant stenosis of their origins.   2.  No significant stenosis or irregularity involving the arteries of the neck by NASCET criteria.   3.  No radiographic evidence of dissection.        Report per radiology    Laboratory:  Labs Ordered and Resulted from Time of ED Arrival to Time of ED Departure   BASIC METABOLIC PANEL - Normal       Result Value    Sodium 139      Potassium 4.0      Chloride 104      Carbon Dioxide (CO2) 24      Anion Gap 11      Urea Nitrogen 11.8      Creatinine 0.69      Calcium 9.2      Glucose 95      GFR Estimate >90     ISTAT HCG QUALITATIVE PREGNANCY POCT - Normal    HCG Qualitative POCT Negative     CBC WITH PLATELETS AND DIFFERENTIAL    WBC Count 6.0      RBC Count 4.51      Hemoglobin 13.3      Hematocrit 39.2      MCV 87      MCH 29.5      MCHC 33.9      RDW 11.9      Platelet Count 287      % Neutrophils 50      % Lymphocytes 38      % Monocytes 8      % Eosinophils 2      % Basophils 1      % Immature Granulocytes 1      NRBCs per 100 WBC 0      Absolute Neutrophils 3.0      Absolute Lymphocytes 2.3      Absolute Monocytes 0.5      Absolute Eosinophils 0.1      Absolute Basophils 0.1      Absolute Immature Granulocytes 0.0      Absolute NRBCs 0.0        Emergency Department Course & Assessments:    Interventions:  Medications   0.9% sodium chloride BOLUS (0 mLs Intravenous Stopped 5/6/23 1710)   iopamidol (ISOVUE-370) solution 500 mL (75 mLs Intravenous $Given 5/6/23 1613)   for CT scan flush use (80 mLs Intravenous $Given 5/6/23 1614)   metoclopramide (REGLAN)  injection 5 mg (5 mg Intravenous $Given 5/6/23 1806)   dexamethasone PF (DECADRON) injection 10 mg (10 mg Intravenous $Given 5/6/23 1802)   ketorolac (TORADOL) injection 15 mg (15 mg Intravenous $Given 5/6/23 1800)      Independent Interpretation (X-rays, CTs, rhythm strip):  I independently reviewed the patient's head CT note no evidence of bleed mass or other acute abnormality.    Assessments:  1530 I obtained history and examined patient.    Disposition:  The patient was discharged to home.     Impression & Plan    Medical Decision Making:  Bella Scott is a 37 year old female who presents with sudden onset headache about a week ago followed by recurrence of headache which has been persistent for the last 3 days.  Evaluation in the emergency department has been negative.  Given these findings we did elect to obtain CT of the head with CTA of the head and neck which shows no evidence of bleed mass aneurysm dissection or other acute abnormality.  Patient very well-appearing with normal neurologic exam not anemic very low suspicion for occult subarachnoid hemorrhage and we have decided together against LP.  She is not pregnant and basic labs are unremarkable.  There is no evidence of increased ICP.  SAH, stroke, cervical artery dissection, intracranial hemorrhage, meningitis/abscess, CVT, tumor, pseudotumor cerebri, PRES, pre-eclampsia, pituitary apoplexy, glaucoma,  CO poisoning are considered as part of the differential, and considered unlikely.  Pain has improved with medication interventions.  The patient should follow-up with primary physician within 3 days. If the headache continues or the frequency increases, outpatient consultation with neurology will be indicated.  I recommended return for worse pain, fever, vomiting, weakness, vision changes, neck stiffness, or any other concerns.      Diagnosis:    ICD-10-CM    1. Headache  R51.9            Discharge Medications:  New Prescriptions    No medications on  file      Scribe Disclosure:  I, Mauryhollyhi Hired, am serving as a scribe at 3:07 PM on 5/6/2023 to document services personally performed by Franco Murdock PA-C based on my observations and the provider's statements to me.    5/6/2023   Franco Murdock PA-C Etten, Clark Ellsworth, PA-C  05/06/23 1832

## 2023-05-11 ENCOUNTER — OFFICE VISIT (OUTPATIENT)
Dept: FAMILY MEDICINE | Facility: CLINIC | Age: 38
End: 2023-05-11
Payer: COMMERCIAL

## 2023-05-11 VITALS
WEIGHT: 211.1 LBS | BODY MASS INDEX: 31.99 KG/M2 | DIASTOLIC BLOOD PRESSURE: 81 MMHG | HEIGHT: 68 IN | OXYGEN SATURATION: 96 % | RESPIRATION RATE: 18 BRPM | SYSTOLIC BLOOD PRESSURE: 127 MMHG | TEMPERATURE: 98 F | HEART RATE: 88 BPM

## 2023-05-11 DIAGNOSIS — R53.83 OTHER FATIGUE: ICD-10-CM

## 2023-05-11 DIAGNOSIS — Z00.00 ROUTINE GENERAL MEDICAL EXAMINATION AT A HEALTH CARE FACILITY: Primary | ICD-10-CM

## 2023-05-11 DIAGNOSIS — F33.0 MILD EPISODE OF RECURRENT MAJOR DEPRESSIVE DISORDER (H): ICD-10-CM

## 2023-05-11 DIAGNOSIS — E55.9 VITAMIN D DEFICIENCY: ICD-10-CM

## 2023-05-11 DIAGNOSIS — F41.9 ANXIETY: ICD-10-CM

## 2023-05-11 DIAGNOSIS — Z13.6 CARDIOVASCULAR SCREENING; LDL GOAL LESS THAN 160: ICD-10-CM

## 2023-05-11 DIAGNOSIS — E03.4 HYPOTHYROIDISM DUE TO ACQUIRED ATROPHY OF THYROID: ICD-10-CM

## 2023-05-11 LAB
ALBUMIN SERPL BCG-MCNC: 5 G/DL (ref 3.5–5.2)
ALP SERPL-CCNC: 66 U/L (ref 35–104)
ALT SERPL W P-5'-P-CCNC: 20 U/L (ref 10–35)
ANION GAP SERPL CALCULATED.3IONS-SCNC: 13 MMOL/L (ref 7–15)
AST SERPL W P-5'-P-CCNC: 24 U/L (ref 10–35)
BILIRUB SERPL-MCNC: 0.4 MG/DL
BUN SERPL-MCNC: 11.6 MG/DL (ref 6–20)
CALCIUM SERPL-MCNC: 9.5 MG/DL (ref 8.6–10)
CHLORIDE SERPL-SCNC: 105 MMOL/L (ref 98–107)
CHOLEST SERPL-MCNC: 224 MG/DL
CREAT SERPL-MCNC: 0.75 MG/DL (ref 0.51–0.95)
DEPRECATED HCO3 PLAS-SCNC: 22 MMOL/L (ref 22–29)
GFR SERPL CREATININE-BSD FRML MDRD: >90 ML/MIN/1.73M2
GLUCOSE SERPL-MCNC: 96 MG/DL (ref 70–99)
HDLC SERPL-MCNC: 40 MG/DL
LDLC SERPL CALC-MCNC: 147 MG/DL
NONHDLC SERPL-MCNC: 184 MG/DL
POTASSIUM SERPL-SCNC: 4.1 MMOL/L (ref 3.4–5.3)
PROT SERPL-MCNC: 7.8 G/DL (ref 6.4–8.3)
SODIUM SERPL-SCNC: 140 MMOL/L (ref 136–145)
TRIGL SERPL-MCNC: 183 MG/DL
TSH SERPL DL<=0.005 MIU/L-ACNC: 0.62 UIU/ML (ref 0.3–4.2)

## 2023-05-11 PROCEDURE — 99214 OFFICE O/P EST MOD 30 MIN: CPT | Mod: 25 | Performed by: INTERNAL MEDICINE

## 2023-05-11 PROCEDURE — 82306 VITAMIN D 25 HYDROXY: CPT | Performed by: INTERNAL MEDICINE

## 2023-05-11 PROCEDURE — 96127 BRIEF EMOTIONAL/BEHAV ASSMT: CPT | Performed by: INTERNAL MEDICINE

## 2023-05-11 PROCEDURE — 80061 LIPID PANEL: CPT | Performed by: INTERNAL MEDICINE

## 2023-05-11 PROCEDURE — 80053 COMPREHEN METABOLIC PANEL: CPT | Performed by: INTERNAL MEDICINE

## 2023-05-11 PROCEDURE — 84443 ASSAY THYROID STIM HORMONE: CPT | Performed by: INTERNAL MEDICINE

## 2023-05-11 PROCEDURE — 99385 PREV VISIT NEW AGE 18-39: CPT | Performed by: INTERNAL MEDICINE

## 2023-05-11 PROCEDURE — 36415 COLL VENOUS BLD VENIPUNCTURE: CPT | Performed by: INTERNAL MEDICINE

## 2023-05-11 RX ORDER — VENLAFAXINE HYDROCHLORIDE 75 MG/1
75 CAPSULE, EXTENDED RELEASE ORAL DAILY
Qty: 30 CAPSULE | Refills: 3 | Status: SHIPPED | OUTPATIENT
Start: 2023-06-09 | End: 2023-06-22

## 2023-05-11 RX ORDER — VENLAFAXINE HYDROCHLORIDE 37.5 MG/1
CAPSULE, EXTENDED RELEASE ORAL
Qty: 42 CAPSULE | Refills: 0 | Status: SHIPPED | OUTPATIENT
Start: 2023-05-11 | End: 2023-06-12

## 2023-05-11 ASSESSMENT — PATIENT HEALTH QUESTIONNAIRE - PHQ9
SUM OF ALL RESPONSES TO PHQ QUESTIONS 1-9: 7
10. IF YOU CHECKED OFF ANY PROBLEMS, HOW DIFFICULT HAVE THESE PROBLEMS MADE IT FOR YOU TO DO YOUR WORK, TAKE CARE OF THINGS AT HOME, OR GET ALONG WITH OTHER PEOPLE: SOMEWHAT DIFFICULT
5. POOR APPETITE OR OVEREATING: NOT AT ALL
SUM OF ALL RESPONSES TO PHQ QUESTIONS 1-9: 7

## 2023-05-11 ASSESSMENT — ENCOUNTER SYMPTOMS
PARESTHESIAS: 0
MYALGIAS: 1
COUGH: 0
SORE THROAT: 0
DIARRHEA: 0
HEARTBURN: 0
DIZZINESS: 0
SHORTNESS OF BREATH: 0
NAUSEA: 0
WEAKNESS: 0
HEMATURIA: 0
JOINT SWELLING: 0
ABDOMINAL PAIN: 0
HEMATOCHEZIA: 0
BREAST MASS: 1
DYSURIA: 0
FEVER: 0
PALPITATIONS: 0
EYE PAIN: 0
HEADACHES: 1
ARTHRALGIAS: 0
FREQUENCY: 0
CHILLS: 0
NERVOUS/ANXIOUS: 1
CONSTIPATION: 0

## 2023-05-11 ASSESSMENT — ANXIETY QUESTIONNAIRES
7. FEELING AFRAID AS IF SOMETHING AWFUL MIGHT HAPPEN: SEVERAL DAYS
3. WORRYING TOO MUCH ABOUT DIFFERENT THINGS: SEVERAL DAYS
IF YOU CHECKED OFF ANY PROBLEMS ON THIS QUESTIONNAIRE, HOW DIFFICULT HAVE THESE PROBLEMS MADE IT FOR YOU TO DO YOUR WORK, TAKE CARE OF THINGS AT HOME, OR GET ALONG WITH OTHER PEOPLE: SOMEWHAT DIFFICULT
GAD7 TOTAL SCORE: 6
2. NOT BEING ABLE TO STOP OR CONTROL WORRYING: SEVERAL DAYS
1. FEELING NERVOUS, ANXIOUS, OR ON EDGE: SEVERAL DAYS
5. BEING SO RESTLESS THAT IT IS HARD TO SIT STILL: NOT AT ALL
6. BECOMING EASILY ANNOYED OR IRRITABLE: MORE THAN HALF THE DAYS
GAD7 TOTAL SCORE: 6

## 2023-05-11 ASSESSMENT — PAIN SCALES - GENERAL: PAINLEVEL: MILD PAIN (2)

## 2023-05-11 NOTE — PROGRESS NOTES
"Chief Complaint   Patient presents with     Physical     GYN for pap; uncertain when last done.   She has IUD placed 5-6 years ago; \"good for 7\"  PCP: Osmar Rendon-   Was not available for this appt.      SUBJECTIVE:   CC: Bella is an 37 year old who presents for preventive health visit.       5/11/2023    10:08 AM   Additional Questions   Roomed by Comfort GONZALEZ CMA   Accompanied by TIFF         5/11/2023    10:08 AM   Patient Reported Additional Medications   Patient reports taking the following new medications None         Patient has been advised of split billing requirements and indicates understanding: Yes       Healthy Habits:     Getting at least 3 servings of Calcium per day:  Yes    Bi-annual eye exam:  Yes    Dental care twice a year:  Yes    Sleep apnea or symptoms of sleep apnea:  Daytime drowsiness    Diet:  Regular (no restrictions)    Frequency of exercise:  2-3 days/week    Duration of exercise:  15-30 minutes    Taking medications regularly:  Yes    Medication side effects:  Not applicable    PHQ-2 Total Score: 3    Additional concerns today:  Yes            Abnormal Mood Symptoms  Onset: Ongoing (worse in the last two months     Description:   Depression: YES  Anxiety: YES    Accompanying Signs & Symptoms:  Still participating in activities that you used to enjoy: YES  Fatigue: YES  Irritability: YES  Difficulty concentrating: YES  Changes in appetite: no   Problems with sleep: YES  Heart racing/beating fast : YES- feels like her heart is thumping.   Thoughts of hurting yourself or others: none    History:   Recent stress: no   Prior depression hospitalization: None  Family history of depression: YES- Mother   Family history of anxiety: YES- Mother and sister     Precipitating factors:   Alcohol/drug use: no     Alleviating factors:  Sleeping     Therapies Tried and outcome: Patient states that she has tried different depression medications and they all made her feel more tired.   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       Today's PHQ-2 Score:       5/11/2023     9:11 AM   PHQ-2 ( 1999 [employer])   Q1: Little interest or pleasure in doing things 2   Q2: Feeling down, depressed or hopeless 1   PHQ-2 Score 3   Q1: Little interest or pleasure in doing things More than half the days   Q2: Feeling down, depressed or hopeless Several days   PHQ-2 Score 3      PATIENT HEALTH QUESTIONNAIRE-9 (PHQ - 9)    Over the last 2 weeks, how often have you been bothered by any of the following problems?    1. Little interest or pleasure in doing things -  More than half the days   2. Feeling down, depressed, or hopeless -  Several days   3. Trouble falling or staying asleep, or sleeping too much - Several days   4. Feeling tired or having little energy -  More than half the days   5. Poor appetite or overeating -  Not at all   6. Feeling bad about yourself - or that you are a failure or have let yourself or your family down -  Several days   7. Trouble concentrating on things, such as reading the newspaper or watching television - Not at all   8. Moving or speaking so slowly that other people could have noticed? Or the opposite - being so fidgety or restless that you have been moving around a lot more than usual Not at all   9. Thoughts that you would be better off dead or of hurting  yourself in some way Not at all   Total Score: 7     If you checked off any problems, how difficult have these problems made it for you to do your work, take care of things at home, or get along with other people?      Developed by Liane Erickson, Manisha Huddleston, Guido Orourke and colleagues, with an educational jaziel from [employer] No permission required to reproduce, translate, display or distribute. permission required to reproduce, translate, display or distribute.    past meds  Zoloft  Lexapro  Prozac    Wellbutrin  Many years with anxiety and depression.    Including post partum depression;   Kids: daughter 9 years, son 6 years    Have you ever done Advance Care Planning? (For example, a Health Directive, POLST, or a discussion with a medical provider or your loved ones about your wishes): No, advance care planning information given to patient to review.  Patient declined advance care planning discussion at this time.    Social History     Tobacco Use     Smoking status: Never     Smokeless tobacco: Never     Tobacco comments:     no 2nd hand smoke at home   Vaping Use     Vaping status: Never Used   Substance Use Topics     Alcohol use: Yes     Alcohol/week: 0.0 standard drinks of alcohol     Comment: once a month             2023     9:11 AM   Alcohol Use   Prescreen: >3 drinks/day or >7 drinks/week? Not Applicable     Reviewed orders with patient.  Reviewed health maintenance and updated orders accordingly - Yes  Labs reviewed in EPIC  BP Readings from Last 3 Encounters:   23 120/80   23 127/81   23 117/87    Wt Readings from Last 3 Encounters:   23 95.8 kg (211 lb 1.6 oz)   22 92.1 kg (203 lb)   22 90.7 kg (200 lb)                  Patient Active Problem List   Diagnosis     Contraception     Thyroid enlarged     Hypothyroidism     CARDIOVASCULAR SCREENING; LDL GOAL LESS THAN 160     Hypercholesteremia     Thyroid nodule     Multinodular goiter     Ovarian cyst     Indication for care in labor or delivery     Mild major depression (H)     S/P repeat low transverse      Obesity     Pain in the coccyx     Somatic dysfunction of lumbar region     Chronic bilateral low back pain without sciatica     Chronic right-sided low back pain without sciatica     Somatic dysfunction of sacral region     Sacral pain     Fibrocystic changes of left breast     Past Surgical History:   Procedure Laterality Date      SECTION  2014    Procedure:  SECTION;;  Surgeon: Lisa Steele MD;  Location:  L[facility]D       SECTION N/A 3/14/2017    Procedure:  SECTION;  Surgeon: Lucie Jackson MD;  Location: [facility]     HC EXC BENIGN SKIN LESION SCLP/NCK/HNDS/FEET/GEN 0.6-1.0 CM  08    RT Thumb     HC REPAIR OF NAIL BED  08    RT Thumb-benign       Social History     Tobacco Use     Smoking status: Never     Smokeless tobacco: Never     Tobacco comments:     no 2nd hand smoke at home   Vaping Use     Vaping status: Never Used   Substance Use Topics     Alcohol use: Yes     Alcohol/week: 0.0 standard drinks of alcohol     Comment: once a month     Family History   Problem Relation Age of Onset     Thyroid Disease Mother      Family History Negative Father      Cancer Maternal Grandmother         Ovarian Cancer in her late 50s     Diabetes Maternal Grandfather         kidney transplant -diabetes related     Other - See Comments Paternal Grandmother         PVD and uncle on paternal side      Heart Disease Paternal Grandfather         MI-at age of 60's     Thyroid Disease Sister         1-underactive in thyroid, not cancerous      Family History Negative Brother         1     Breast Cancer No family hx of      Cerebrovascular Disease No family hx of      Cancer - colorectal No family hx of          Current Outpatient Medications   Medication Sig Dispense Refill     Cholecalciferol (VITAMIN D3 GUMMIES ADULT PO)        levonorgestrel (MIRENA) 20 MCG/24HR IUD 1 each by Intrauterine route once       levothyroxine (SYNTHROID/LEVOTHROID) 112 MCG tablet Take 1 tablet (112 mcg) by mouth daily 90 tablet 3     Prenatal Vit-Fe Fumarate-FA (PRENATABS FA) TABS  (Patient not taking: Reported on 2023)       venlafaxine (EFFEXOR XR) 37.5 MG 24 hr capsule Take 1 capsule (37.5 mg) by mouth daily for 14 days, THEN 2 capsules (75 mg) daily for 14 days. 42 capsule 0     [START ON 2023] venlafaxine (EFFEXOR XR) 75 MG 24 hr capsule Take 1 capsule (75 mg) by mouth daily 30 capsule 3     metoclopramide (REGLAN) 10 MG tablet Take 1  tablet (10 mg) by mouth 3 times daily as needed (nausea, headache) 18 tablet 3     rizatriptan (MAXALT) 10 MG tablet Take 1 tablet (10 mg) by mouth at onset of headache for migraine May repeat in 2 hours. Max 3 tablets/24 hours. 18 tablet 3     Allergies   Allergen Reactions     No Known Allergies      Recent Labs   Lab Test 05/11/23  1140 05/06/23  1543 07/06/22  1413 04/18/22  1147 04/18/22  1147 03/24/21  0851 01/21/21  1440 11/27/20  1100 11/10/17  1306 11/07/17  1135 08/04/16  1333 04/22/16  0933 01/06/16  1028   A1C  --   --   --   --   --   --   --   --   --   --   --  5.6  --    *  --   --   --   --  136*  --  151*   < >  --    < > 135*  --    HDL 40*  --   --   --   --  44*  --  35*   < >  --    < > 39*  --    TRIG 183*  --   --   --   --  69  --  84   < >  --    < > 97  --    ALT 20  --   --   --  28  --   --   --   --  21  --   --  17   CR 0.75 0.69  --    < > 0.81  --   --   --   --  0.65  --   --  0.66   GFRESTIMATED >90 >90  --    < > >90  --   --   --   --  >90  --   --  >90  Non  GFR Calc     GFRESTBLACK  --   --   --   --   --   --   --   --   --  >90  --   --  >90  African American GFR Calc     POTASSIUM 4.1 4.0  --   --  4.0  --   --   --   --  3.9  --   --  3.7   TSH 0.62  --  0.48   < > 0.25* 1.18   < > 0.27*   < >  --    < > 0.17* 3.20    < > = values in this interval not displayed.        Breast Cancer Screening:    FHS-7:       4/7/2021     2:39 PM 8/3/2022     3:09 PM 5/11/2023     9:13 AM   Breast CA Risk Assessment (FHS-7)   Did any of your first-degree relatives have breast or ovarian cancer? No No No   Did any of your relatives have bilateral breast cancer? No No No   Did any man in your family have breast cancer? No No No   Did any woman in your family have breast and ovarian cancer? Yes Yes No   Did any woman in your family have breast cancer before age 50 y? No No No   Do you have 2 or more relatives with breast and/or ovarian cancer? No No No   Do you have 2 or  more relatives with breast and/or bowel cancer? No No No     click delete button to remove this line now  Patient under 40 years of age: Routine Mammogram Screening not recommended.   Pertinent mammograms are reviewed under the imaging tab.    History of abnormal Pap smear:       2016    12:00 AM 2012    12:01 PM 2011    12:00 AM   PAP / HPV   PAP (Historical) NIL   NIL   NIL       Reviewed and updated as needed this visit by clinical staff   Tobacco  Allergies  Meds  Problems  Med Hx  Surg Hx  Fam Hx          Reviewed and updated as needed this visit by Provider   Tobacco  Allergies  Meds  Problems  Med Hx  Surg Hx  Fam Hx         Past Medical History:   Diagnosis Date     Anemia, unspecified 3/24/2003    iron deficiney per pt     Contraception      Unspecified hypothyroidism       Past Surgical History:   Procedure Laterality Date      SECTION  2014    Procedure:  SECTION;;  Surgeon: Lisa Steele MD;  Location:  [facility]D      SECTION N/A 3/14/2017    Procedure:  SECTION;  Surgeon: Lucie Jackson MD;  Location:  L[facility]D     HC EXC BENIGN SKIN LESION SCLP/NCK/HNDS/FEET/GEN 0.6-1.0 CM  08    RT Thumb     HC REPAIR OF NAIL BED  08    RT Thumb-benign       Review of Systems   Constitutional: Negative for chills and fever.   HENT: Negative for congestion, ear pain, hearing loss and sore throat.    Eyes: Negative for pain and visual disturbance.   Respiratory: Negative for cough and shortness of breath.    Cardiovascular: Negative for chest pain, palpitations and peripheral edema.   Gastrointestinal: Negative for abdominal pain, constipation, diarrhea, heartburn, hematochezia and nausea.   Breasts:  Negative for tenderness and discharge.   Genitourinary: Negative for dysuria, frequency, genital sores, hematuria, pelvic pain, urgency, vaginal bleeding and vaginal discharge.   Musculoskeletal: Positive for myalgias. Negative for arthralgias and joint  "swelling.   Skin: Negative for rash.   Neurological: Positive for headaches (myofascial pain- trigger poing inj in ER; has Neurology  appt). Negative for dizziness, weakness and paresthesias.   Psychiatric/Behavioral: Positive for mood changes. The patient is nervous/anxious.      Anxiety/depression  past meds  Zoloft  Lexapro  Prozac    Wellbutrin  Many years with anxiety and depression.   Including post partum depression;   Kids: daughter 9 years, son 6 years       OBJECTIVE:   /81 (BP Location: Right arm, Patient Position: Sitting, Cuff Size: Adult Large)   Pulse 88   Temp 98  F (36.7  C) (Oral)   Resp 18   Ht 1.727 m (5' 8\")   Wt 95.8 kg (211 lb 1.6 oz)   SpO2 96%   BMI 32.10 kg/m    Physical Exam  GENERAL: healthy, alert and no distress  NECK: no adenopathy, no asymmetry, masses, or scars and thyroid normal to palpation  RESP: lungs clear to auscultation - no rales, rhonchi or wheezes  CV: regular rates and rhythm, normal S1 S2, no S3 or S4, no murmur, click or rub and no peripheral edema  ABDOMEN: soft, nontender, no hepatosplenomegaly, no masses and bowel sounds normal  MS: no gross musculoskeletal defects noted, no edema  NEURO: Normal strength and tone, mentation intact and speech normal  PSYCH: mentation appears normal and affect normal/bright    Diagnostic Test Results:  Labs reviewed in Epic              ASSESSMENT/PLAN:   (Z00.00) Routine general medical examination at a health care facility  (primary encounter diagnosis)  Comment: HEALTH CARE MAINTENANCE reviewed  Plan:     (E03.4) Hypothyroidism due to acquired atrophy of thyroid  Comment: Clinically euthyroid; labs today, consider dose adjustment if labs warrant   Plan: TSH with free T4 reflex          (Z13.6) CARDIOVASCULAR SCREENING; LDL GOAL LESS THAN 160  Comment: screening    Plan: Lipid panel reflex to direct LDL Fasting,         Comprehensive metabolic panel (BMP + Alb, Alk         Phos, ALT, AST, Total. Bili, TP)          (F33.0) " Mild episode of recurrent major depressive disorder (H)  Comment: in the pst she did Gene Sight testing- indicated Venlfaxine as an option for meds;       11/27/2020    10:21 AM 4/18/2022    10:50 AM 5/11/2023     9:04 AM   PHQ   PHQ-9 Total Score 2 7 7   Q9: Thoughts of better off dead/self-harm past 2 weeks Not at all Not at all Not at all    reviewed stressors medication options; willing to start at Venlafaxine 37.5 mg for 2 weeks followed by Venlafaxine 75mg daily   Plan: venlafaxine (EFFEXOR XR) 37.5 MG 24 hr capsule,        venlafaxine (EFFEXOR XR) 75 MG 24 hr capsule,         Adult Mental Health  Referral          (F41.9) Anxiety  Comment: reviewed stressors medication options; willing to start at Venlafaxine 37.5 mg for 2 weeks followed by Venlafaxine 75mg daily  And add counseling referral ; follow up with PCP or myself in 6-8 weeks to assess medication tolerance, effectiveness and whether counseling was started.   Hold off on any evaluation regarding ADD- suspect her symptoms are related to ongoing mental health concerns.   Plan: venlafaxine (EFFEXOR XR) 37.5 MG 24 hr capsule,        venlafaxine (EFFEXOR XR) 75 MG 24 hr capsule,         Adult Mental Health  Referral          (R53.83) Other fatigue  Comment: multifactorial.  Plan: TSH with free T4 reflex, Comprehensive         metabolic panel (BMP + Alb, Alk Phos, ALT, AST,        Total. Bili, TP), Vitamin D Deficiency          (E55.9) Vitamin D deficiency  Comment: Vitamin D. supplement  Plan: Vitamin D Deficiency            Patient has been advised of split billing requirements and indicates understanding: Yes      COUNSELING:  Reviewed preventive health counseling, as reflected in patient instructions       Regular exercise       Healthy diet/nutrition        She reports that she has never smoked. She has never used smokeless tobacco.        Esthela Arias MD  Internal Medicine   [facility] [place]    30 minutes in  addition to HEALTH CARE MAINTENANCE are spent with patient, over 50% of that time spent providing counselling, discussing and reviewing medical conditions/concerns, meds and potential side effects.      Answers for HPI/ROS submitted by the patient on 5/11/2023  If you checked off any problems, how difficult have these problems made it for you to do your work, take care of things at home, or get along with other people?: Somewhat difficult  PHQ9 TOTAL SCORE: 7

## 2023-05-12 LAB — DEPRECATED CALCIDIOL+CALCIFEROL SERPL-MC: 23 UG/L (ref 20–75)

## 2023-05-18 ENCOUNTER — OFFICE VISIT (OUTPATIENT)
Dept: NEUROLOGY | Facility: CLINIC | Age: 38
End: 2023-05-18
Payer: COMMERCIAL

## 2023-05-18 VITALS — SYSTOLIC BLOOD PRESSURE: 120 MMHG | OXYGEN SATURATION: 99 % | HEART RATE: 70 BPM | DIASTOLIC BLOOD PRESSURE: 80 MMHG

## 2023-05-18 DIAGNOSIS — G43.009 MIGRAINE WITHOUT AURA AND WITHOUT STATUS MIGRAINOSUS, NOT INTRACTABLE: ICD-10-CM

## 2023-05-18 DIAGNOSIS — M54.81 OCCIPITAL NEURALGIA OF RIGHT SIDE: Primary | ICD-10-CM

## 2023-05-18 PROCEDURE — 99204 OFFICE O/P NEW MOD 45 MIN: CPT

## 2023-05-18 RX ORDER — METOCLOPRAMIDE 10 MG/1
10 TABLET ORAL 3 TIMES DAILY PRN
Qty: 18 TABLET | Refills: 3 | Status: SHIPPED | OUTPATIENT
Start: 2023-05-18 | End: 2023-11-15

## 2023-05-18 RX ORDER — RIZATRIPTAN BENZOATE 10 MG/1
10 TABLET ORAL
Qty: 18 TABLET | Refills: 3 | Status: SHIPPED | OUTPATIENT
Start: 2023-05-18 | End: 2023-11-15

## 2023-05-18 ASSESSMENT — HEADACHE IMPACT TEST (HIT 6)
HOW OFTEN HAVE YOU FELT TOO TIRED TO WORK BECAUSE OF YOUR HEADACHES: SOMETIMES
HOW OFTEN DID HEADACHS LIMIT CONCENTRATION ON WORK OR DAILY ACTIVITY: VERY OFTEN
HIT6 TOTAL SCORE: 63
WHEN YOU HAVE A HEADACHE HOW OFTEN DO YOU WISH YOU COULD LIE DOWN: VERY OFTEN
HOW OFTEN DO HEADACHES LIMIT YOUR DAILY ACTIVITIES: SOMETIMES
WHEN YOU HAVE HEADACHES HOW OFTEN IS THE PAIN SEVERE: SOMETIMES
HOW OFTEN HAVE YOU FELT FED UP OR IRRITATED BECAUSE OF YOUR HEADACHES: VERY OFTEN

## 2023-05-18 ASSESSMENT — PAIN SCALES - GENERAL: PAINLEVEL: NO PAIN (0)

## 2023-05-18 NOTE — NURSING NOTE
"Bella Scott is a 37 year old female who presents for:  Chief Complaint   Patient presents with     Headache     Hospital referral follow up   New Headache   CT Head 5/6/23 - [facility]        Initial Vitals:  /80   Pulse 70   SpO2 99%  Estimated body mass index is 32.1 kg/m  as calculated from the following:    Height as of 5/11/23: 1.727 m (5' 8\").    Weight as of 5/11/23: 95.8 kg (211 lb 1.6 oz).. There is no height or weight on file to calculate BSA. BP completed using cuff size: regular  No Pain (0)    Nursing Comments:     Cristina Alarcon MA    "

## 2023-05-18 NOTE — PATIENT INSTRUCTIONS
If rizatriptan (Maxalt) is not helpful or causes side effects, you can try metoclopramide (Reglan) for other headache rescue treatment. Limit use of these medications to no more than 9 days per month each to avoid side effects.    Someone should call you to schedule nerve block.    [id number]

## 2023-05-18 NOTE — PROGRESS NOTES
[facility]   Headache Neurology Consult    May 18, 2023     Bella Scott MRN# [id number]   YOB: 1985 Age: 37 year old     Referring provider: No ref. provider found          Assessment and Recommendations:     Bella Scott is a 37 year old female who presents for further evaluation of headache.    She has a prior history of episodic migraine without aura.  These new headaches are consistent with right occipital neuralgia, though it is possible that the lingering headaches are progressing to migraine headaches given her associated photophobia and occasional nausea.    Her neurologic examination is intact today with the exception of radiation of pain with palpation over the right occipital area.  As part of her ER evaluation she had a CT head and CTA head and neck completed which were both reassuring.  I did not recommend further evaluation for secondary causes of her headache today.  Should her headache features change or headaches be refractory to treatment as expected, could consider repeat imaging or additional evaluation.    We discussed the following treatment strategy:  - For acute treatment of mild headache, she may use acetaminophen or ibuprofen as needed, not to exceed 14 days per month to avoid medication overuse.   - For acute treatment of moderate to severe headache, she may try rizatriptan 10 mg tablet taken at onset of headache with a repeat dose taken after 2 hours if needed. Use should not exceed 9 days per month to avoid medication overuse.  - For nausea with headache or if rizatriptan is not helpful, could try metoclopramide 10 mg tablet taken up to every 8 hours as needed.  Use should not exceed more than 9 days/month to avoid medication side effects.    For her occipital neuralgia, I have placed a referral today for her to receive an occipital nerve block.    It is unclear at this time if her headaches will warrant prevention beyond occipital nerve  "blocks in the future.  Should she require anything, I would recommend avoiding tricyclic antidepressants while she is using venlafaxine to avoid interactions.  Could consider gabapentin or carbamazepine.    I will plan to follow-up with her in 2 to 3 months to monitor her progress.    Jody Lockett PA-C  Headache Neurology  [facility] - [place]            Chief Complaint:     Chief Complaint   Patient presents with     Headache     Hospital referral follow up   New Headache   CT Head 5/6/23 - [facility]           History is obtained from the patient and medical record.      Bella Scott is a 37 year old female who presents for further evaluation of headache.    About 3 weeks ago, she had 2 instances 5 days apart of sharp, \"lightening bolt\"-like pain which radiated from the right side at the base of her head up towards behind her eyes, some radiation down into her right shoulder.  The sharp pain lasted seconds but left behind a dull headache in the affected region that persisted the rest of the day.    She had a similar occurrence a week later, which was more severe.  She presented to the ER for this headache pain and was given ketorolac, dexamethasone, metoclopramide.  CT head and CTA head and neck were completed at this visit which were reassuring.  She was also given a trigger point injection with bupivacaine into the paraspinous muscles just to the left of C6.  The next day after her ER visit, she did have relief from her headache, but eventually symptoms returned.    She continues to experience occasional shooting zings of pain throughout the right side of her head, severe episodes can radiate across her forehead and behind her eyes.  Lingering headaches remain on the right side though can also be a throbbing pain behind her eyes.  She has occasional nausea without vomiting.  She has associated photophobia.  She can feel fatigued, woozy.    She notes that she has " "experienced similar \"lightening bolt\" headaches in the past, but these have always just been brief and not lead to more prolonged headaches.    She does have a history of migraine headaches which she typically gets once a month around her menstrual period.  These headaches are located above her eyes and are a throbbing pain.  She will have associated photophobia and nausea.  These headaches typically last 24 hours and can be resolved with Tylenol or ibuprofen.    She experienced some intermittent blurred vision with the headache episode that brought her to the ER, but otherwise denies visual disturbances.  She denies any focal paresthesias or weakness, unilateral autonomic features, positional component, associated fevers or illness.    Currently, she is using venlafaxine daily, but just started this a few days ago so unclear if this is providing any headache benefit.  Otherwise, for acute treatment she has just used over the counter things, such as acetaminophen or ibuprofen.  She is sensitive to caffeine so avoids Excedrin.    She denies any recent head or neck injuries, though does note that she fell and hit her head back in 2022.    She notes a history of chronic back pain, but this is typically lower back, not involving her cervical spine.    She is not aware of any significant family history of headaches.    She is up-to-date on her eye exams.  She does note that even at baseline, she is more light sensitive.                Past Medical History:     Past Medical History:   Diagnosis Date     Anemia, unspecified 3/24/2003    iron deficiney per pt     Contraception      Unspecified hypothyroidism                 Past Surgical History:     Past Surgical History:   Procedure Laterality Date      SECTION  2014    Procedure:  SECTION;;  Surgeon: Lisa Steele MD;  Location:  L+D      SECTION N/A 3/14/2017    Procedure:  SECTION;  Surgeon: Lucie Jackson MD;  Location: " [facility] L+D     HC EXC BENIGN SKIN LESION SCLP/NCK/HNDS/FEET/GEN 0.6-1.0 CM  5/16/08    RT Thumb     HC REPAIR OF NAIL BED  5/16/08    RT Thumb-benign             Social History:     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)    Social History     Socioeconomic History     Marital status:      Spouse name: Not on file     Number of children: 0     Years of education: 16     Highest education level: Not on file   Occupational History     Occupation: student     Comment: [employer]   Tobacco Use     Smoking status: Never     Smokeless tobacco: Never     Tobacco comments:     no 2nd hand smoke at home   Vaping Use     Vaping status: Never Used   Substance and Sexual Activity     Alcohol use: Yes     Alcohol/week: 0.0 standard drinks of alcohol     Comment: once a month     Drug use: No     Sexual activity: Yes     Partners: Male     Birth control/protection: Condom, I.U.D.   Other Topics Concern      Service No     Blood Transfusions No     Caffeine Concern Yes     Comment: 1 soda per day     Occupational Exposure Not Asked     Hobby Hazards Not Asked     Sleep Concern No     Stress Concern No     Weight Concern No     Special Diet No     Back Care Not Asked     Exercise Yes     Bike Helmet Not Asked     Seat Belt Yes     Self-Exams No     Comment: once in a while     Parent/sibling w/ CABG, MI or angioplasty before 65F 55M? 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 , working fulltime RN at [employer], no kids, no pets     Social Determinants of Health     Financial Resource Strain: Not on file   Food Insecurity: Not on file   Transportation Needs: Not on file   Physical Activity: Not on file   Stress: Not on file   Social Connections: Not on file   Intimate Partner Violence: Not on file   Housing Stability: Not on file             Family History:     Family History   Problem Relation Age of Onset     Thyroid Disease Mother       Family History Negative Father      Cancer Maternal Grandmother         Ovarian Cancer in her late 50s     Diabetes Maternal Grandfather         kidney transplant 11/07-diabetes related     Other - See Comments Paternal Grandmother         PVD and uncle on paternal side      Heart Disease Paternal Grandfather         MI-at age of 60's     Thyroid Disease Sister         1-underactive in thyroid, not cancerous      Family History Negative Brother         1     Breast Cancer No family hx of      Cerebrovascular Disease No family hx of      Cancer - colorectal No family hx of              Allergies:      Allergies   Allergen Reactions     No Known Allergies              Medications:     Current Outpatient Medications:      Cholecalciferol (VITAMIN D3 GUMMIES ADULT PO), , Disp: , Rfl:      levonorgestrel (MIRENA) 20 MCG/24HR IUD, 1 each by Intrauterine route once, Disp: , Rfl:      levothyroxine (SYNTHROID/LEVOTHROID) 112 MCG tablet, Take 1 tablet (112 mcg) by mouth daily, Disp: 90 tablet, Rfl: 3     venlafaxine (EFFEXOR XR) 37.5 MG 24 hr capsule, Take 1 capsule (37.5 mg) by mouth daily for 14 days, THEN 2 capsules (75 mg) daily for 14 days., Disp: 42 capsule, Rfl: 0     [START ON 6/9/2023] venlafaxine (EFFEXOR XR) 75 MG 24 hr capsule, Take 1 capsule (75 mg) by mouth daily, Disp: 30 capsule, Rfl: 3     Prenatal Vit-Fe Fumarate-FA (PRENATABS FA) TABS, , Disp: , Rfl:           Physical Exam:   /80   Pulse 70   SpO2 99%      General: In no acute distress.  Head: Normocephalic, atraumatic.  Radiating pain with palpation over the right occipital nerves.  Temporal pulses intact.   Neck: Normal range of motion with lateral head movements and neck flexion.  Eyes: No conjunctival injection, no scleral icterus.     Neurologic Exam:  Mental Status Exam: Alert, awake and oriented to situation. No dysarthria. Speech of normal fluency.  Cranial Nerves: Fundoscopic exam with clear disc margins bilaterally. PERRLA, EOMs intact,  no nystagmus, facial movements symmetric, facial sensation intact to light touch, hearing intact to conversation, trapezius and SCMs 5/5 bilaterally, tongue midline and fully mobile. No tongue atrophy or fasciculations.   Motor: Normal tone in all four extremities, no atrophy or fasciculations. 5/5 strength bilaterally in shoulder abduction, elbow flexion and extension, wrist flexion and extension, hip flexion, knee flexion and extension, dorsiflexion and plantarflexion. No tremors or abnormal movements noted.  Sensory: Sensation intact to light touch on arms and legs bilaterally.   Coordination: Finger-nose-finger intact bilaterally. Rapidly alternating movements intact bilaterally in the upper extremities. Normal finger tapping bilaterally. Normal Romberg.  Reflexes: 2+ and symmetric in triceps, biceps, brachioradialis, patellar, and Achilles. Plantar reflexes are downgoing bilaterally.  Gait: Normal gait. Able to toe and heel walk. Normal tandem gait.            Data:     5/6/23  IMPRESSION:   HEAD CT:  1.  No CT finding of a mass, hemorrhage or focal area suggestive of acute infarct.     HEAD CTA:   1.  No discrete vessel occlusion, significant stenosis, aneurysm or high flow vascular malformation involving the arteries of the [place].     NECK CTA:  1.  Normal configuration of the great vessels off the aortic arch with no significant stenosis of their origins.  2.  No significant stenosis or irregularity involving the arteries of the neck by NASCET criteria.  3.  No radiographic evidence of dissection.

## 2023-05-18 NOTE — LETTER
5/18/2023         RE: Bella Scott  [address]  [place] [address]        Dear Colleague,    Thank you for referring your patient, Bella Scott, to the [facility]. Please see a copy of my visit note below.    [facility]   Headache Neurology Consult    May 18, 2023     Bella Scott MRN# [id number]   YOB: 1985 Age: 37 year old     Referring provider: No ref. provider found          Assessment and Recommendations:     Bella Scott is a 37 year old female who presents for further evaluation of headache.    She has a prior history of episodic migraine without aura.  These new headaches are consistent with right occipital neuralgia, though it is possible that the lingering headaches are progressing to migraine headaches given her associated photophobia and occasional nausea.    Her neurologic examination is intact today with the exception of radiation of pain with palpation over the right occipital area.  As part of her ER evaluation she had a CT head and CTA head and neck completed which were both reassuring.  I did not recommend further evaluation for secondary causes of her headache today.  Should her headache features change or headaches be refractory to treatment as expected, could consider repeat imaging or additional evaluation.    We discussed the following treatment strategy:  - For acute treatment of mild headache, she may use acetaminophen or ibuprofen as needed, not to exceed 14 days per month to avoid medication overuse.   - For acute treatment of moderate to severe headache, she may try rizatriptan 10 mg tablet taken at onset of headache with a repeat dose taken after 2 hours if needed. Use should not exceed 9 days per month to avoid medication overuse.  - For nausea with headache or if rizatriptan is not helpful, could try metoclopramide 10 mg tablet taken up to every 8 hours as needed.  Use should not exceed  "more than 9 days/month to avoid medication side effects.    For her occipital neuralgia, I have placed a referral today for her to receive an occipital nerve block.    It is unclear at this time if her headaches will warrant prevention beyond occipital nerve blocks in the future.  Should she require anything, I would recommend avoiding tricyclic antidepressants while she is using venlafaxine to avoid interactions.  Could consider gabapentin or carbamazepine.    I will plan to follow-up with her in 2 to 3 months to monitor her progress.    Jody Lockett PA-C  Headache Neurology  [facility]            Chief Complaint:     Chief Complaint   Patient presents with     Headache     Hospital referral follow up   New Headache   CT Head 5/6/23 - [facility]           History is obtained from the patient and medical record.      Bella Scott is a 37 year old female who presents for further evaluation of headache.    About 3 weeks ago, she had 2 instances 5 days apart of sharp, \"lightening bolt\"-like pain which radiated from the right side at the base of her head up towards behind her eyes, some radiation down into her right shoulder.  The sharp pain lasted seconds but left behind a dull headache in the affected region that persisted the rest of the day.    She had a similar occurrence a week later, which was more severe.  She presented to the ER for this headache pain and was given ketorolac, dexamethasone, metoclopramide.  CT head and CTA head and neck were completed at this visit which were reassuring.  She was also given a trigger point injection with bupivacaine into the paraspinous muscles just to the left of C6.  The next day after her ER visit, she did have relief from her headache, but eventually symptoms returned.    She continues to experience occasional shooting zings of pain throughout the right side of her head, severe episodes can radiate across her forehead and " "behind her eyes.  Lingering headaches remain on the right side though can also be a throbbing pain behind her eyes.  She has occasional nausea without vomiting.  She has associated photophobia.  She can feel fatigued, woozy.    She notes that she has experienced similar \"lightening bolt\" headaches in the past, but these have always just been brief and not lead to more prolonged headaches.    She does have a history of migraine headaches which she typically gets once a month around her menstrual period.  These headaches are located above her eyes and are a throbbing pain.  She will have associated photophobia and nausea.  These headaches typically last 24 hours and can be resolved with Tylenol or ibuprofen.    She experienced some intermittent blurred vision with the headache episode that brought her to the ER, but otherwise denies visual disturbances.  She denies any focal paresthesias or weakness, unilateral autonomic features, positional component, associated fevers or illness.    Currently, she is using venlafaxine daily, but just started this a few days ago so unclear if this is providing any headache benefit.  Otherwise, for acute treatment she has just used over the counter things, such as acetaminophen or ibuprofen.  She is sensitive to caffeine so avoids Excedrin.    She denies any recent head or neck injuries, though does note that she fell and hit her head back in December 2022.    She notes a history of chronic back pain, but this is typically lower back, not involving her cervical spine.    She is not aware of any significant family history of headaches.    She is up-to-date on her eye exams.  She does note that even at baseline, she is more light sensitive.                Past Medical History:     Past Medical History:   Diagnosis Date     Anemia, unspecified 3/24/2003    iron deficiney per pt     Contraception      Unspecified hypothyroidism                 Past Surgical History:     Past Surgical " History:   Procedure Laterality Date      SECTION  2014    Procedure:  SECTION;;  Surgeon: Lisa Steele MD;  Location:  L+D      SECTION N/A 3/14/2017    Procedure:  SECTION;  Surgeon: Lucie Jackson MD;  Location:  L+D     HC EXC BENIGN SKIN LESION SCLP/NCK/HNDS/FEET/GEN 0.6-1.0 CM  08    RT Thumb     HC REPAIR OF NAIL BED  08    RT Thumb-benign             Social History:     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)    Social History     Socioeconomic History     Marital status:      Spouse name: Not on file     Number of children: 0     Years of education: 16     Highest education level: Not on file   Occupational History     Occupation: student     Comment: [employer]   Tobacco Use     Smoking status: Never     Smokeless tobacco: Never     Tobacco comments:     no 2nd hand smoke at home   Vaping Use     Vaping status: Never Used   Substance and Sexual Activity     Alcohol use: Yes     Alcohol/week: 0.0 standard drinks of alcohol     Comment: once a month     Drug use: No     Sexual activity: Yes     Partners: Male     Birth control/protection: Condom, I.U.D.   Other Topics Concern      Service No     Blood Transfusions No     Caffeine Concern Yes     Comment: 1 soda per day     Occupational Exposure Not Asked     Hobby Hazards Not Asked     Sleep Concern No     Stress Concern No     Weight Concern No     Special Diet No     Back Care Not Asked     Exercise Yes     Bike Helmet Not Asked     Seat Belt Yes     Self-Exams No     Comment: once in a while     Parent/sibling w/ CABG, MI or angioplasty before 65F 55M? 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 , working fulltime RN at [employer], no kids, no pets     Social Determinants of Health     Financial Resource Strain: Not on file   Food Insecurity: Not on file   Transportation Needs: Not on file   Physical Activity: Not  on file   Stress: Not on file   Social Connections: Not on file   Intimate Partner Violence: Not on file   Housing Stability: Not on file             Family History:     Family History   Problem Relation Age of Onset     Thyroid Disease Mother      Family History Negative Father      Cancer Maternal Grandmother         Ovarian Cancer in her late 50s     Diabetes Maternal Grandfather         kidney transplant 11/07-diabetes related     Other - See Comments Paternal Grandmother         PVD and uncle on paternal side      Heart Disease Paternal Grandfather         MI-at age of 60's     Thyroid Disease Sister         1-underactive in thyroid, not cancerous      Family History Negative Brother         1     Breast Cancer No family hx of      Cerebrovascular Disease No family hx of      Cancer - colorectal No family hx of              Allergies:      Allergies   Allergen Reactions     No Known Allergies              Medications:     Current Outpatient Medications:      Cholecalciferol (VITAMIN D3 GUMMIES ADULT PO), , Disp: , Rfl:      levonorgestrel (MIRENA) 20 MCG/24HR IUD, 1 each by Intrauterine route once, Disp: , Rfl:      levothyroxine (SYNTHROID/LEVOTHROID) 112 MCG tablet, Take 1 tablet (112 mcg) by mouth daily, Disp: 90 tablet, Rfl: 3     venlafaxine (EFFEXOR XR) 37.5 MG 24 hr capsule, Take 1 capsule (37.5 mg) by mouth daily for 14 days, THEN 2 capsules (75 mg) daily for 14 days., Disp: 42 capsule, Rfl: 0     [START ON 6/9/2023] venlafaxine (EFFEXOR XR) 75 MG 24 hr capsule, Take 1 capsule (75 mg) by mouth daily, Disp: 30 capsule, Rfl: 3     Prenatal Vit-Fe Fumarate-FA (PRENATABS FA) TABS, , Disp: , Rfl:           Physical Exam:   /80   Pulse 70   SpO2 99%      General: In no acute distress.  Head: Normocephalic, atraumatic.  Radiating pain with palpation over the right occipital nerves.  Temporal pulses intact.   Neck: Normal range of motion with lateral head movements and neck flexion.  Eyes: No  conjunctival injection, no scleral icterus.     Neurologic Exam:  Mental Status Exam: Alert, awake and oriented to situation. No dysarthria. Speech of normal fluency.  Cranial Nerves: Fundoscopic exam with clear disc margins bilaterally. PERRLA, EOMs intact, no nystagmus, facial movements symmetric, facial sensation intact to light touch, hearing intact to conversation, trapezius and SCMs 5/5 bilaterally, tongue midline and fully mobile. No tongue atrophy or fasciculations.   Motor: Normal tone in all four extremities, no atrophy or fasciculations. 5/5 strength bilaterally in shoulder abduction, elbow flexion and extension, wrist flexion and extension, hip flexion, knee flexion and extension, dorsiflexion and plantarflexion. No tremors or abnormal movements noted.  Sensory: Sensation intact to light touch on arms and legs bilaterally.   Coordination: Finger-nose-finger intact bilaterally. Rapidly alternating movements intact bilaterally in the upper extremities. Normal finger tapping bilaterally. Normal Romberg.  Reflexes: 2+ and symmetric in triceps, biceps, brachioradialis, patellar, and Achilles. Plantar reflexes are downgoing bilaterally.  Gait: Normal gait. Able to toe and heel walk. Normal tandem gait.            Data:     5/6/23  IMPRESSION:   HEAD CT:  1.  No CT finding of a mass, hemorrhage or focal area suggestive of acute infarct.     HEAD CTA:   1.  No discrete vessel occlusion, significant stenosis, aneurysm or high flow vascular malformation involving the arteries of the [place].     NECK CTA:  1.  Normal configuration of the great vessels off the aortic arch with no significant stenosis of their origins.  2.  No significant stenosis or irregularity involving the arteries of the neck by NASCET criteria.  3.  No radiographic evidence of dissection.          Again, thank you for allowing me to participate in the care of your patient.        Sincerely,        SILVERIO NEWMAN PA-C

## 2023-05-31 DIAGNOSIS — F41.9 ANXIETY: ICD-10-CM

## 2023-05-31 DIAGNOSIS — F33.0 MILD EPISODE OF RECURRENT MAJOR DEPRESSIVE DISORDER (H): ICD-10-CM

## 2023-06-02 DIAGNOSIS — F41.9 ANXIETY: ICD-10-CM

## 2023-06-02 DIAGNOSIS — F33.0 MILD EPISODE OF RECURRENT MAJOR DEPRESSIVE DISORDER (H): ICD-10-CM

## 2023-06-02 RX ORDER — VENLAFAXINE HYDROCHLORIDE 37.5 MG/1
CAPSULE, EXTENDED RELEASE ORAL
Qty: 42 CAPSULE | Refills: 0 | OUTPATIENT
Start: 2023-06-02

## 2023-06-05 RX ORDER — VENLAFAXINE HYDROCHLORIDE 75 MG/1
CAPSULE, EXTENDED RELEASE ORAL
Qty: 30 CAPSULE | Refills: 3 | OUTPATIENT
Start: 2023-06-05

## 2023-06-12 ENCOUNTER — OFFICE VISIT (OUTPATIENT)
Dept: PALLIATIVE MEDICINE | Facility: CLINIC | Age: 38
End: 2023-06-12
Payer: COMMERCIAL

## 2023-06-12 VITALS — HEART RATE: 68 BPM | DIASTOLIC BLOOD PRESSURE: 64 MMHG | OXYGEN SATURATION: 98 % | SYSTOLIC BLOOD PRESSURE: 125 MMHG

## 2023-06-12 DIAGNOSIS — M54.81 OCCIPITAL NEURALGIA OF RIGHT SIDE: ICD-10-CM

## 2023-06-12 PROCEDURE — 64405 NJX AA&/STRD GR OCPL NRV: CPT | Mod: RT | Performed by: PHYSICAL MEDICINE & REHABILITATION

## 2023-06-12 RX ORDER — TRIAMCINOLONE ACETONIDE 40 MG/ML
40 INJECTION, SUSPENSION INTRA-ARTICULAR; INTRAMUSCULAR ONCE
Status: COMPLETED | OUTPATIENT
Start: 2023-06-12 | End: 2023-06-12

## 2023-06-12 RX ORDER — BUPIVACAINE HYDROCHLORIDE 2.5 MG/ML
2 INJECTION, SOLUTION EPIDURAL; INFILTRATION; INTRACAUDAL ONCE
Status: COMPLETED | OUTPATIENT
Start: 2023-06-12 | End: 2023-06-12

## 2023-06-12 RX ADMIN — BUPIVACAINE HYDROCHLORIDE 5 MG: 2.5 INJECTION, SOLUTION EPIDURAL; INFILTRATION; INTRACAUDAL at 14:22

## 2023-06-12 RX ADMIN — TRIAMCINOLONE ACETONIDE 40 MG: 40 INJECTION, SUSPENSION INTRA-ARTICULAR; INTRAMUSCULAR at 14:22

## 2023-06-12 ASSESSMENT — PAIN SCALES - GENERAL: PAINLEVEL: NO PAIN (1)

## 2023-06-12 NOTE — PATIENT INSTRUCTIONS
[facility]  Post Procedure Instructions    Today you had:   occipital nerve block       Medications used:    bupivacaine   kenalog      Monitor the injection sites for signs and symptoms of infection-fever, chills, redness, swelling, warmth, or drainage to areas.  You may have soreness at injection sites for up to 24 hours.  It may take up to 14 days for the steroid medication to start working although you may feel the effect as early as a few days after the procedure.     You may apply ice to the painful areas to help minimize the discomfort of the needle pokes.  Do not apply heat to sites for at least 12 hours.  You may use anti-inflammatory medications or Tylenol for pain control if necessary    Pain Clinic phone number during work hours (Monday through Friday 8 am-4:30 pm) at [phone] or the Provider Line after hours at [phone]:

## 2023-06-12 NOTE — PROGRESS NOTES
Pre procedure Diagnosis: occipital neuralgia   Post procedure Diagnosis: Same  Procedure performed: Right occipital nerve block  Anesthesia: none  Complications: none  Operators: Barbara Pastrana MD    Indications:   Bella Scott is a 37 year old female with a history of right sided headache consistent with occipital neuralgia.     Options/alternatives, benefits and risks were discussed with the patient including bleeding, infection, hematoma, nerve damage. Questions were answered to her satisfaction and she agrees to proceed. Voluntary informed consent was obtained and signed.     Vitals were reviewed: Yes  Allergies were reviewed:  Yes   Medications were reviewed:  Yes     Procedure:  After getting informed consent, a Pause for the Cause was performed.    The occipital ridge, occipital protuberance, and mastoid process were palpated on the right side.  The location of maximal tenderness which was consistent with the location of the greater occipital nerve was palpated.  The area was cleaned.    Palpation for a pulse was completed, with no pulse noted at the site of the injection.  A 25G, 1.5 inch needle was introduced, aimed cephalad, in the superficial tissues at this area of tenderness.  The injection was completed at this location, fanning in 3 different directions.  Aspiration for heme was negative before all injections.    In total, 2ml of 0.25% bupivacaine and 40 mg kenalog was injected.     The patient tolerated the procedure well, hemostasis was achieved.      Follow-up includes:   - f/u with referring provider    Barbara Pastrana MD  [facility]

## 2023-06-22 ENCOUNTER — OFFICE VISIT (OUTPATIENT)
Dept: FAMILY MEDICINE | Facility: CLINIC | Age: 38
End: 2023-06-22
Attending: INTERNAL MEDICINE
Payer: COMMERCIAL

## 2023-06-22 VITALS
HEART RATE: 75 BPM | BODY MASS INDEX: 31.81 KG/M2 | WEIGHT: 209.9 LBS | SYSTOLIC BLOOD PRESSURE: 110 MMHG | DIASTOLIC BLOOD PRESSURE: 64 MMHG | RESPIRATION RATE: 16 BRPM | TEMPERATURE: 98.1 F | HEIGHT: 68 IN | OXYGEN SATURATION: 97 %

## 2023-06-22 DIAGNOSIS — E03.4 HYPOTHYROIDISM DUE TO ACQUIRED ATROPHY OF THYROID: ICD-10-CM

## 2023-06-22 DIAGNOSIS — F41.9 ANXIETY: ICD-10-CM

## 2023-06-22 DIAGNOSIS — F33.0 MILD EPISODE OF RECURRENT MAJOR DEPRESSIVE DISORDER (H): ICD-10-CM

## 2023-06-22 PROCEDURE — 99214 OFFICE O/P EST MOD 30 MIN: CPT | Performed by: PHYSICIAN ASSISTANT

## 2023-06-22 RX ORDER — VENLAFAXINE HYDROCHLORIDE 150 MG/1
150 CAPSULE, EXTENDED RELEASE ORAL DAILY
Qty: 90 CAPSULE | Refills: 0 | Status: SHIPPED | OUTPATIENT
Start: 2023-06-22 | End: 2023-08-22

## 2023-06-22 RX ORDER — LEVOTHYROXINE SODIUM 112 UG/1
112 TABLET ORAL DAILY
Qty: 90 TABLET | Refills: 3 | Status: SHIPPED | OUTPATIENT
Start: 2023-06-22 | End: 2024-05-28

## 2023-06-22 ASSESSMENT — ANXIETY QUESTIONNAIRES
GAD7 TOTAL SCORE: 5
8. IF YOU CHECKED OFF ANY PROBLEMS, HOW DIFFICULT HAVE THESE MADE IT FOR YOU TO DO YOUR WORK, TAKE CARE OF THINGS AT HOME, OR GET ALONG WITH OTHER PEOPLE?: SOMEWHAT DIFFICULT
7. FEELING AFRAID AS IF SOMETHING AWFUL MIGHT HAPPEN: SEVERAL DAYS
6. BECOMING EASILY ANNOYED OR IRRITABLE: MORE THAN HALF THE DAYS
2. NOT BEING ABLE TO STOP OR CONTROL WORRYING: NOT AT ALL
5. BEING SO RESTLESS THAT IT IS HARD TO SIT STILL: NOT AT ALL
GAD7 TOTAL SCORE: 5
IF YOU CHECKED OFF ANY PROBLEMS ON THIS QUESTIONNAIRE, HOW DIFFICULT HAVE THESE PROBLEMS MADE IT FOR YOU TO DO YOUR WORK, TAKE CARE OF THINGS AT HOME, OR GET ALONG WITH OTHER PEOPLE: SOMEWHAT DIFFICULT
3. WORRYING TOO MUCH ABOUT DIFFERENT THINGS: SEVERAL DAYS
1. FEELING NERVOUS, ANXIOUS, OR ON EDGE: SEVERAL DAYS
7. FEELING AFRAID AS IF SOMETHING AWFUL MIGHT HAPPEN: SEVERAL DAYS
4. TROUBLE RELAXING: NOT AT ALL

## 2023-06-22 ASSESSMENT — PAIN SCALES - GENERAL: PAINLEVEL: NO PAIN (0)

## 2023-06-22 ASSESSMENT — PATIENT HEALTH QUESTIONNAIRE - PHQ9: SUM OF ALL RESPONSES TO PHQ QUESTIONS 1-9: 8

## 2023-06-22 NOTE — PROGRESS NOTES
"  Assessment & Plan     Mild episode of recurrent major depressive disorder (H)  Anxiety  Increase dose today.  She feels it is helping but mood could improve.  Having stress at work- discussed doing FMLA for intermittent leave due to panic/anxiety.  She will let me know.  Follow up in 2-3 months, sooner prn.  - venlafaxine (EFFEXOR XR) 150 MG 24 hr capsule; Take 1 capsule (150 mg) by mouth daily    Hypothyroidism due to acquired atrophy of thyroid  Refilled today.  Lab is up to date.  - levothyroxine (SYNTHROID/LEVOTHROID) 112 MCG tablet; Take 1 tablet (112 mcg) by mouth daily         BMI:   Estimated body mass index is 31.92 kg/m  as calculated from the following:    Height as of this encounter: 1.727 m (5' 8\").    Weight as of this encounter: 95.2 kg (209 lb 14.4 oz).   Weight management plan: Discussed healthy diet and exercise guidelines        Osmar Rendon PA-C  [facility] HUMBERTO Blanco is a 37 year old, presenting for the following health issues:  Recheck Medication        6/22/2023     1:51 PM   Additional Questions   Roomed by Comfort GONZALEZ CMA   Accompanied by TIFF         6/22/2023     1:51 PM   Patient Reported Additional Medications   Patient reports taking the following new medications None     History of Present Illness       Mental Health Follow-up:  Patient presents to follow-up on Depression & Anxiety.Patient's depression since last visit has been:  Better  The patient is not having other symptoms associated with depression.  Patient's anxiety since last visit has been:  Better  The patient is not having other symptoms associated with anxiety.  Any significant life events: No  Patient is feeling anxious or having panic attacks.  Patient has no concerns about alcohol or drug use.    She eats 2-3 servings of fruits and vegetables daily.She consumes 1 sweetened beverage(s) daily.She exercises with enough effort to increase her heart rate 20 to 29 minutes per day.  She " "exercises with enough effort to increase her heart rate 3 or less days per week.   She is taking medications regularly.  Today's DESIREE-7 Score: 5       Following up on start of Effexor- started by Dr Arias    The past week has not been sleeping well and has had some nausea but that is gone.  Mood improved since starting but she is wondering about increasing dose.  Says her mental health was kind of spiraling downwards again.  Hasn't been on anything for a while now.  Work is a big stress- has been calling in for shifts due to anxiety/panic.  Didn't used to be such a problem but now supervisor is starting to not like the absences. Wondering about ROCHELLE, FMLA            Review of Systems   Constitutional, HEENT, cardiovascular, pulmonary, gi and gu systems are negative, except as otherwise noted.      Objective    /64 (BP Location: Right arm, Patient Position: Sitting, Cuff Size: Adult Regular)   Pulse 75   Temp 98.1  F (36.7  C) (Oral)   Resp 16   Ht 1.727 m (5' 8\")   Wt 95.2 kg (209 lb 14.4 oz)   LMP 05/29/2023 (Approximate)   SpO2 97%   BMI 31.92 kg/m    Body mass index is 31.92 kg/m .  Physical Exam   GENERAL: healthy, alert and no distress  PSYCH: mentation appears normal, affect flat, anxious and fatigued                  "

## 2023-09-25 ENCOUNTER — OFFICE VISIT (OUTPATIENT)
Dept: FAMILY MEDICINE | Facility: CLINIC | Age: 38
End: 2023-09-25
Payer: COMMERCIAL

## 2023-09-25 VITALS
BODY MASS INDEX: 32.89 KG/M2 | HEIGHT: 68 IN | SYSTOLIC BLOOD PRESSURE: 122 MMHG | OXYGEN SATURATION: 97 % | TEMPERATURE: 98.2 F | RESPIRATION RATE: 20 BRPM | DIASTOLIC BLOOD PRESSURE: 77 MMHG | WEIGHT: 217 LBS | HEART RATE: 84 BPM

## 2023-09-25 DIAGNOSIS — L50.9 HIVES: Primary | ICD-10-CM

## 2023-09-25 LAB
ANION GAP SERPL CALCULATED.3IONS-SCNC: 11 MMOL/L (ref 7–15)
BUN SERPL-MCNC: 12.7 MG/DL (ref 6–20)
CALCIUM SERPL-MCNC: 9.2 MG/DL (ref 8.6–10)
CHLORIDE SERPL-SCNC: 105 MMOL/L (ref 98–107)
CREAT SERPL-MCNC: 0.67 MG/DL (ref 0.51–0.95)
DEPRECATED HCO3 PLAS-SCNC: 23 MMOL/L (ref 22–29)
EGFRCR SERPLBLD CKD-EPI 2021: >90 ML/MIN/1.73M2
ERYTHROCYTE [DISTWIDTH] IN BLOOD BY AUTOMATED COUNT: 11.9 % (ref 10–15)
ERYTHROCYTE [SEDIMENTATION RATE] IN BLOOD BY WESTERGREN METHOD: 11 MM/HR (ref 0–20)
GLUCOSE SERPL-MCNC: 72 MG/DL (ref 70–99)
HCT VFR BLD AUTO: 37 % (ref 35–47)
HGB BLD-MCNC: 13.1 G/DL (ref 11.7–15.7)
MCH RBC QN AUTO: 29.8 PG (ref 26.5–33)
MCHC RBC AUTO-ENTMCNC: 35.4 G/DL (ref 31.5–36.5)
MCV RBC AUTO: 84 FL (ref 78–100)
PLATELET # BLD AUTO: 279 10E3/UL (ref 150–450)
POTASSIUM SERPL-SCNC: 4.2 MMOL/L (ref 3.4–5.3)
RBC # BLD AUTO: 4.39 10E6/UL (ref 3.8–5.2)
SODIUM SERPL-SCNC: 139 MMOL/L (ref 136–145)
WBC # BLD AUTO: 5.8 10E3/UL (ref 4–11)

## 2023-09-25 PROCEDURE — 85652 RBC SED RATE AUTOMATED: CPT | Performed by: FAMILY MEDICINE

## 2023-09-25 PROCEDURE — 90471 IMMUNIZATION ADMIN: CPT | Performed by: FAMILY MEDICINE

## 2023-09-25 PROCEDURE — 99213 OFFICE O/P EST LOW 20 MIN: CPT | Mod: 25 | Performed by: FAMILY MEDICINE

## 2023-09-25 PROCEDURE — 80048 BASIC METABOLIC PNL TOTAL CA: CPT | Performed by: FAMILY MEDICINE

## 2023-09-25 PROCEDURE — 90686 IIV4 VACC NO PRSV 0.5 ML IM: CPT | Performed by: FAMILY MEDICINE

## 2023-09-25 PROCEDURE — 85027 COMPLETE CBC AUTOMATED: CPT | Performed by: FAMILY MEDICINE

## 2023-09-25 PROCEDURE — 36415 COLL VENOUS BLD VENIPUNCTURE: CPT | Performed by: FAMILY MEDICINE

## 2023-09-25 RX ORDER — CETIRIZINE HYDROCHLORIDE 10 MG/1
10 TABLET ORAL DAILY
COMMUNITY
Start: 2023-09-25

## 2023-09-25 ASSESSMENT — ANXIETY QUESTIONNAIRES
GAD7 TOTAL SCORE: 1
1. FEELING NERVOUS, ANXIOUS, OR ON EDGE: NOT AT ALL
7. FEELING AFRAID AS IF SOMETHING AWFUL MIGHT HAPPEN: NOT AT ALL
6. BECOMING EASILY ANNOYED OR IRRITABLE: SEVERAL DAYS
3. WORRYING TOO MUCH ABOUT DIFFERENT THINGS: NOT AT ALL
GAD7 TOTAL SCORE: 1
5. BEING SO RESTLESS THAT IT IS HARD TO SIT STILL: NOT AT ALL
4. TROUBLE RELAXING: NOT AT ALL
IF YOU CHECKED OFF ANY PROBLEMS ON THIS QUESTIONNAIRE, HOW DIFFICULT HAVE THESE PROBLEMS MADE IT FOR YOU TO DO YOUR WORK, TAKE CARE OF THINGS AT HOME, OR GET ALONG WITH OTHER PEOPLE: NOT DIFFICULT AT ALL
2. NOT BEING ABLE TO STOP OR CONTROL WORRYING: NOT AT ALL

## 2023-09-25 NOTE — PROGRESS NOTES
Assessment & Plan     Hives  Discussed allergens, vs virus vs chronic hives but has not been 6 weeks yet  Handout on the above diagnosis was given to the patient and discussed   - INFLUENZA VACCINE IM > 6 MONTHS VALENT IIV4 (AFLURIA/FLUZONE)  - cetirizine (ZYRTEC) 10 MG tablet  - CBC with platelets  - ESR: Erythrocyte sedimentation rate  - Basic metabolic panel  (Ca, Cl, CO2, Creat, Gluc, K, Na, BUN)  - Adult Allergy/Asthma  Referral  - CBC with platelets  - ESR: Erythrocyte sedimentation rate  - Basic metabolic panel  (Ca, Cl, CO2, Creat, Gluc, K, Na, BUN)        20 minutes spent by me on the date of the encounter doing chart review, history and exam, documentation and further activities per the note       See Patient Instructions    Jesusita Ivey MD  [facility]    Kt Blanco is a 37 year old, presenting for the following health issues:  she has had hives at night and fading during the day for the last 3 weeks.   She does not wake with itching but when she wakes she notices them.   She also had some skin irritation mid August but not hives.   She took zyrtec for that and it helped. .   She is now using zyrtec for the hives and not sure it is helping.    They can be anywhere but not her face yet.   They itch.   She cannot think of anything that has changed.  No new meds except the zyrtec.   No new lotions or creams or shampoos or detergent.   She has washed her sheets.   This has never happened before.    Hives        9/25/2023     9:52 AM   Additional Questions   Roomed by Kiley CARR CMA       History of Present Illness       Reason for visit:  Unexplained hives on and off for last 3 weeks  Symptom onset:  3-4 weeks ago  Symptoms include:  Hives on abdomen chest arms thighs  Symptom intensity:  Moderate  Symptom progression:  Staying the same  Had these symptoms before:  No  What makes it worse:  No  What makes it better:  Taking antihistamine and hydrocortisone  cream    She eats 2-3 servings of fruits and vegetables daily.She consumes 2 sweetened beverage(s) daily.She exercises with enough effort to increase her heart rate 10 to 19 minutes per day.  She exercises with enough effort to increase her heart rate 5 days per week.   She is taking medications regularly.       Past Medical History:   Diagnosis Date    Anemia, unspecified 3/24/2003    iron deficiney per pt    Contraception     Unspecified hypothyroidism        Past Surgical History:   Procedure Laterality Date     SECTION  2014    Procedure:  SECTION;;  Surgeon: Lisa Steele MD;  Location: [facility]     SECTION N/A 3/14/2017    Procedure:  SECTION;  Surgeon: Lucie Jackson MD;  Location: [facility]    HC EXC BENIGN SKIN LESION SCLP/NCK/HNDS/FEET/GEN 0.6-1.0 CM  08    RT Thumb    HC REPAIR OF NAIL BED  08    RT Thumb-benign       MEDICATIONS:  Current Outpatient Medications   Medication    cetirizine (ZYRTEC) 10 MG tablet    Cholecalciferol (VITAMIN D3 GUMMIES ADULT PO)    levonorgestrel (MIRENA) 20 MCG/24HR IUD    levothyroxine (SYNTHROID/LEVOTHROID) 112 MCG tablet    metoclopramide (REGLAN) 10 MG tablet    rizatriptan (MAXALT) 10 MG tablet    venlafaxine (EFFEXOR XR) 75 MG 24 hr capsule     No current facility-administered medications for this visit.       SOCIAL HISTORY:  Social History     Tobacco Use    Smoking status: Never     Passive exposure: Never    Smokeless tobacco: Never    Tobacco comments:     no 2nd hand smoke at home   Substance Use Topics    Alcohol use: Yes     Alcohol/week: 0.0 standard drinks of alcohol     Comment: once a month       Family History   Problem Relation Age of Onset    Thyroid Disease Mother     Family History Negative Father     Cancer Maternal Grandmother         Ovarian Cancer in her late 50s    Diabetes Maternal Grandfather         kidney transplant -diabetes related    Other - See Comments Paternal Grandmother         PVD and  "uncle on paternal side     Heart Disease Paternal Grandfather         MI-at age of 60's    Thyroid Disease Sister         1-underactive in thyroid, not cancerous     Family History Negative Brother         1    Breast Cancer No family hx of     Cerebrovascular Disease No family hx of     Cancer - colorectal No family hx of        Rash  Onset/Duration: 3 weeks ago  Description  Location: started on the abdomen and spreading all over the body   Character: raised, painful, burning, red  Itching: moderate  Intensity:  moderate  Progression of Symptoms:  worsening  Accompanying signs and symptoms:   Fever: No  Body aches or joint pain: No  Sore throat symptoms: No  Recent cold symptoms: No  History:           Previous episodes of similar rash: comes and goes  New exposures:  None  Recent travel: YES, [place] a month ago for 10 days, was itchy a week before traveling   Exposure to similar rash: No    Therapies tried and outcome: Zyrtec/cetirizine -  usually effective        Review of Systems   Constitutional, HEENT, cardiovascular, pulmonary, gi and gu systems are negative, except as otherwise noted.      Objective    /77 (BP Location: Right arm, Patient Position: Sitting, Cuff Size: Adult Regular)   Pulse 84   Temp 98.2  F (36.8  C) (Oral)   Resp 20   Ht 1.727 m (5' 8\")   Wt 98.4 kg (217 lb)   SpO2 97%   BMI 32.99 kg/m    Body mass index is 32.99 kg/m .  Physical Exam   GENERAL: healthy, alert, no distress, and over weight  EYES: Eyes grossly normal to inspection, PERRL and conjunctivae and sclerae normal  NECK: no adenopathy, no asymmetry, masses, or scars and thyroid normal to palpation  RESP: lungs clear to auscultation - no rales, rhonchi or wheezes  CV: regular rate and rhythm, normal S1 S2, no S3 or S4, no murmur, click or rub, no peripheral edema and peripheral pulses strong  MS: no gross musculoskeletal defects noted, no edema  SKIN: a picture is shown of red puffy wheels on abdomin and arms - some " are small and some are silver dollar sized - today skin is clear   NEURO: Normal strength and tone, mentation intact and speech normal  PSYCH: mentation appears normal, affect normal/bright  LYMPH: no cervical, supraclavicular, axillary, or inguinal adenopathy    Results for orders placed or performed in visit on 09/25/23 (from the past 24 hour(s))   CBC with platelets   Result Value Ref Range    WBC Count 5.8 4.0 - 11.0 10e3/uL    RBC Count 4.39 3.80 - 5.20 10e6/uL    Hemoglobin 13.1 11.7 - 15.7 g/dL    Hematocrit 37.0 35.0 - 47.0 %    MCV 84 78 - 100 fL    MCH 29.8 26.5 - 33.0 pg    MCHC 35.4 31.5 - 36.5 g/dL    RDW 11.9 10.0 - 15.0 %    Platelet Count 279 150 - 450 10e3/uL   ESR: Erythrocyte sedimentation rate   Result Value Ref Range    Erythrocyte Sedimentation Rate 11 0 - 20 mm/hr

## 2023-10-17 ENCOUNTER — MYC MEDICAL ADVICE (OUTPATIENT)
Dept: FAMILY MEDICINE | Facility: CLINIC | Age: 38
End: 2023-10-17
Payer: COMMERCIAL

## 2023-10-17 NOTE — TELEPHONE ENCOUNTER
"Called patient, states the hives started the first week of September. Denies any issues with breathing, no other symptoms except for itchy. Currently feeling stuffy and has been sneezing a lot. Patient is using Zyrtec and hydrocortisone cream every day, states \"it takes the edge off.\" Patient states that the hives started on stomach and it has now spread to chest, arms, thighs, ankles, wrists, chin, jaw. The only spot she does not have hives is back per patient. Patient agreeable to be seen at the [facility] tomorrow. Patient wondering if she can be seen sooner at allergy clinic. Advised that referral was ordered for \"next available opening.\"     Appointments in Next Year      Oct 18, 2023 10:00 AM  (Arrive by 9:40 AM)  Provider Visit with Suly Whitaker PA-C  [facility] ([facility] ) [phone]     Chelsea Bose RN on 10/17/2023 at 1:03 PM    "

## 2023-10-18 ENCOUNTER — OFFICE VISIT (OUTPATIENT)
Dept: FAMILY MEDICINE | Facility: CLINIC | Age: 38
End: 2023-10-18
Payer: COMMERCIAL

## 2023-10-18 VITALS
WEIGHT: 215 LBS | HEART RATE: 92 BPM | OXYGEN SATURATION: 98 % | HEIGHT: 68 IN | RESPIRATION RATE: 12 BRPM | DIASTOLIC BLOOD PRESSURE: 77 MMHG | SYSTOLIC BLOOD PRESSURE: 114 MMHG | BODY MASS INDEX: 32.58 KG/M2 | TEMPERATURE: 98.3 F

## 2023-10-18 DIAGNOSIS — F41.9 ANXIETY: ICD-10-CM

## 2023-10-18 DIAGNOSIS — L50.9 HIVES: Primary | ICD-10-CM

## 2023-10-18 DIAGNOSIS — F33.0 MILD EPISODE OF RECURRENT MAJOR DEPRESSIVE DISORDER (H): ICD-10-CM

## 2023-10-18 LAB — ERYTHROCYTE [SEDIMENTATION RATE] IN BLOOD BY WESTERGREN METHOD: 10 MM/HR (ref 0–20)

## 2023-10-18 PROCEDURE — 36415 COLL VENOUS BLD VENIPUNCTURE: CPT | Performed by: PHYSICIAN ASSISTANT

## 2023-10-18 PROCEDURE — 85652 RBC SED RATE AUTOMATED: CPT | Performed by: PHYSICIAN ASSISTANT

## 2023-10-18 PROCEDURE — 99214 OFFICE O/P EST MOD 30 MIN: CPT | Performed by: PHYSICIAN ASSISTANT

## 2023-10-18 PROCEDURE — 84439 ASSAY OF FREE THYROXINE: CPT | Performed by: PHYSICIAN ASSISTANT

## 2023-10-18 PROCEDURE — 86140 C-REACTIVE PROTEIN: CPT | Performed by: PHYSICIAN ASSISTANT

## 2023-10-18 PROCEDURE — 86800 THYROGLOBULIN ANTIBODY: CPT | Performed by: PHYSICIAN ASSISTANT

## 2023-10-18 PROCEDURE — 86376 MICROSOMAL ANTIBODY EACH: CPT | Performed by: PHYSICIAN ASSISTANT

## 2023-10-18 PROCEDURE — 84443 ASSAY THYROID STIM HORMONE: CPT | Performed by: PHYSICIAN ASSISTANT

## 2023-10-18 RX ORDER — PREDNISONE 20 MG/1
TABLET ORAL
Qty: 17 TABLET | Refills: 0 | Status: SHIPPED | OUTPATIENT
Start: 2023-10-18 | End: 2023-11-01

## 2023-10-18 RX ORDER — VENLAFAXINE HYDROCHLORIDE 75 MG/1
75 CAPSULE, EXTENDED RELEASE ORAL DAILY
Qty: 90 CAPSULE | Refills: 1 | Status: SHIPPED | OUTPATIENT
Start: 2023-10-18 | End: 2024-04-30

## 2023-10-18 NOTE — PROGRESS NOTES
Assessment & Plan     Hives  Hives have been present now for about 2 months. No obvious etiology can be found. Discussed with patient that multiple things can be at play in the body and perhaps this is why the Zyrtec does not seem to be working. She will try oral Benadryl at night. If not helping can start prednisone taper as noted below.   Follow up with dermatology for chronic hives.  - TSH; Future  - T4, free; Future  - Thyroid peroxidase antibody; Future  - Anti thyroglobulin antibody; Future  - ESR: Erythrocyte sedimentation rate; Future  - CRP, inflammation; Future  - Adult Allergy/Asthma  Referral; Future  - Adult Allergy/Asthma  Referral; Future  - predniSONE (DELTASONE) 20 MG tablet; Take 2 tablets (40 mg) by mouth daily for 5 days, THEN 1 tablet (20 mg) daily for 5 days, THEN 0.5 tablets (10 mg) daily for 4 days.  - TSH  - T4, free  - Thyroid peroxidase antibody  - Anti thyroglobulin antibody  - ESR: Erythrocyte sedimentation rate  - CRP, inflammation    Anxiety  Refilled for patient today. Previously on higher dose but has found 75 mg works well.  - venlafaxine (EFFEXOR XR) 75 MG 24 hr capsule; Take 1 capsule (75 mg) by mouth daily    Mild episode of recurrent major depressive disorder (H24)    - venlafaxine (EFFEXOR XR) 75 MG 24 hr capsule; Take 1 capsule (75 mg) by mouth daily    Review of external notes as documented elsewhere in note  Review of the result(s) of each unique test - ESR  Ordering of each unique test  Prescription drug management  31 minutes spent by me on the date of the encounter doing chart review, history and exam, documentation and further activities per the note       Suly Whitaker PA-C  [facility]    Kt Blanco is a 37 year old, presenting for the following health issues:  Hives (All on the body and have spread over the past 8 weeks (auto immune) welts /No new items) and Thyroid Problem (Hypo Thyroid -- Maybe Hashimoto her  "sister has had it)        10/18/2023     2:56 PM   Additional Questions   Roomed by manoj garvin   Accompanied by son Mynor       History of Present Illness       Reason for visit:  Hives for 6 weeks unknown cause    She eats 2-3 servings of fruits and vegetables daily.She consumes 1 sweetened beverage(s) daily.She exercises with enough effort to increase her heart rate 10 to 19 minutes per day.  She exercises with enough effort to increase her heart rate 3 or less days per week.   She is taking medications regularly.     She reported feeling itchy middle of August. She had a hear rash that occurred when she went to [facility]. She took Zyrtec at that time which was helpful.   She returned home and hives started within a week.    She reports she wakes in the morning with hives over the arms, trunk and legs.  Started on the abdomen but has slowly spread over other parts of the body.    Going to start using Benadryl at night with Zyrtec in the morning. Currently just using Zyrtec in the morning.    She denies any illnesses prior to hives arriving.  She denies any new exposures (detergents, lotions etc).    Review of Systems   GENERAL:  No fevers         Objective    /77 (BP Location: Right arm, Patient Position: Sitting, Cuff Size: Adult Large)   Pulse 92   Temp 98.3  F (36.8  C) (Oral)   Resp 12   Ht 1.727 m (5' 8\")   Wt 97.5 kg (215 lb)   SpO2 98%   BMI 32.69 kg/m    Body mass index is 32.69 kg/m .  Physical Exam   GENERAL: No acute distress  HEENT: Normocephalic  SKIN: Faint hives over the arms noted  NEURO: Alert and non-focal      Results for orders placed or performed in visit on 10/18/23 (from the past 24 hour(s))   ESR: Erythrocyte sedimentation rate   Result Value Ref Range    Erythrocyte Sedimentation Rate 10 0 - 20 mm/hr                   "

## 2023-10-19 LAB
CRP SERPL-MCNC: 3.91 MG/L
T4 FREE SERPL-MCNC: 1.36 NG/DL (ref 0.9–1.7)
TSH SERPL DL<=0.005 MIU/L-ACNC: 0.28 UIU/ML (ref 0.3–4.2)

## 2023-10-20 LAB — THYROPEROXIDASE AB SERPL-ACNC: 95 IU/ML

## 2023-10-21 LAB
Lab: NORMAL
PERFORMING LABORATORY: NORMAL
SPECIMEN STATUS: NORMAL
TEST NAME: NORMAL

## 2023-10-22 LAB — MISCELLANEOUS TEST 1 (ARUP): NORMAL

## 2023-10-23 ENCOUNTER — TELEPHONE (OUTPATIENT)
Dept: ENDOCRINOLOGY | Facility: CLINIC | Age: 38
End: 2023-10-23

## 2023-10-23 NOTE — TELEPHONE ENCOUNTER
Results do not meet criteria for triage.   Ashley Aldana, RN on 10/23/2023 at 2:57 PM       RE    Triage Information      Decision: (none)   Priority: Routine: Next available opening             [facility]    Phone Message    May a detailed message be left on voicemail: yes     Reason for Call: Appointment Intake    Referring Provider Name: Suly Whitaker PA-C  Diagnosis and/or Symptoms: Hypothyroidism, Anxiety, Hives    No appts available within 4 weeks, pt scheduled for first-available on 3/4/24.     Action Taken: Other: Endo    Travel Screening: Not Applicable

## 2023-10-24 ENCOUNTER — LAB (OUTPATIENT)
Dept: LAB | Facility: CLINIC | Age: 38
End: 2023-10-24
Payer: COMMERCIAL

## 2023-10-24 DIAGNOSIS — F41.9 ANXIETY: ICD-10-CM

## 2023-10-24 PROCEDURE — 36415 COLL VENOUS BLD VENIPUNCTURE: CPT

## 2023-10-24 PROCEDURE — 86364 TISS TRNSGLTMNASE EA IG CLAS: CPT

## 2023-10-24 PROCEDURE — 82784 ASSAY IGA/IGD/IGG/IGM EACH: CPT

## 2023-10-25 LAB — IGA SERPL-MCNC: 110 MG/DL (ref 84–499)

## 2023-10-26 LAB
TTG IGA SER-ACNC: 0.3 U/ML
TTG IGG SER-ACNC: 0.7 U/ML

## 2023-11-01 ENCOUNTER — MYC MEDICAL ADVICE (OUTPATIENT)
Dept: FAMILY MEDICINE | Facility: CLINIC | Age: 38
End: 2023-11-01
Payer: COMMERCIAL

## 2023-11-01 NOTE — TELEPHONE ENCOUNTER
My portion of the form completed. Please fill in the patient information that was not filled out and fax. I asked if she wanted a copy. If she does please provide it for her in the way she would like.

## 2023-11-11 ENCOUNTER — MYC MEDICAL ADVICE (OUTPATIENT)
Dept: FAMILY MEDICINE | Facility: CLINIC | Age: 38
End: 2023-11-11
Payer: COMMERCIAL

## 2023-11-13 NOTE — TELEPHONE ENCOUNTER
Yeah I can complete it. I did it for the previous one. Can you reach out to [place] [employer] to have them fax it over when then have it?

## 2023-11-13 NOTE — TELEPHONE ENCOUNTER
Suly Whitaker, PAC    Please review my chart   Do you want to complete this paperwork? Have pcp complete it? Allergist ?     Thank you   Kirstie Mendiola, Registered Nurse  [facility]

## 2023-11-14 NOTE — TELEPHONE ENCOUNTER
Spoke with patient, she will come in and fill out paperwork so can be faxed as soon as possible.  Amie Batres TC

## 2023-11-14 NOTE — TELEPHONE ENCOUNTER
I completed however there is a patient portion (I filled a lot of that out too). Can she come in to fill out and then we can fax.

## 2023-11-14 NOTE — TELEPHONE ENCOUNTER
We did get the paperwork for pt's employer  ([employer]) but not the one from [employer] [facility] [employer] [facility] disability benefits provider. Can you fax this to Suly Whitaker at: [phone]?    Thank you so much!    Amie Batres, TC

## 2023-11-14 NOTE — TELEPHONE ENCOUNTER
Routed to JOSIAH FROST, see Suly request, can you fax it to Suly's attention at [phone], route message back to Suly informing when sent  Kathryn Mcdaniels RN, BSN  [facility]

## 2023-11-14 NOTE — TELEPHONE ENCOUNTER
No paperwork from [facility] has been sent here.     Radha Sanabria  Lead   [facility] Jovon Atkins

## 2023-11-15 ENCOUNTER — OFFICE VISIT (OUTPATIENT)
Dept: URGENT CARE | Facility: URGENT CARE | Age: 38
End: 2023-11-15
Payer: COMMERCIAL

## 2023-11-15 VITALS
RESPIRATION RATE: 14 BRPM | OXYGEN SATURATION: 99 % | BODY MASS INDEX: 32.69 KG/M2 | TEMPERATURE: 98.1 F | WEIGHT: 215 LBS | SYSTOLIC BLOOD PRESSURE: 128 MMHG | DIASTOLIC BLOOD PRESSURE: 84 MMHG | HEART RATE: 81 BPM

## 2023-11-15 DIAGNOSIS — R06.2 WHEEZING: ICD-10-CM

## 2023-11-15 DIAGNOSIS — R68.83 CHILLS: ICD-10-CM

## 2023-11-15 DIAGNOSIS — R05.1 ACUTE COUGH: Primary | ICD-10-CM

## 2023-11-15 DIAGNOSIS — J02.9 SORE THROAT: ICD-10-CM

## 2023-11-15 LAB — DEPRECATED S PYO AG THROAT QL EIA: NEGATIVE

## 2023-11-15 PROCEDURE — 87651 STREP A DNA AMP PROBE: CPT | Performed by: FAMILY MEDICINE

## 2023-11-15 PROCEDURE — 99213 OFFICE O/P EST LOW 20 MIN: CPT | Performed by: FAMILY MEDICINE

## 2023-11-15 RX ORDER — HYDROXYZINE HYDROCHLORIDE 25 MG/1
TABLET, FILM COATED ORAL
COMMUNITY
Start: 2023-11-08 | End: 2023-11-15

## 2023-11-15 RX ORDER — ALBUTEROL SULFATE 90 UG/1
2 AEROSOL, METERED RESPIRATORY (INHALATION) EVERY 6 HOURS PRN
Qty: 18 G | Refills: 0 | Status: SHIPPED | OUTPATIENT
Start: 2023-11-15 | End: 2024-03-04

## 2023-11-15 RX ORDER — PREDNISONE 20 MG/1
TABLET ORAL
COMMUNITY
Start: 2023-11-08 | End: 2024-03-04

## 2023-11-15 RX ORDER — BENZONATATE 200 MG/1
200 CAPSULE ORAL 3 TIMES DAILY PRN
Qty: 21 CAPSULE | Refills: 0 | Status: SHIPPED | OUTPATIENT
Start: 2023-11-15 | End: 2024-03-04

## 2023-11-15 RX ORDER — DOXYCYCLINE HYCLATE 100 MG
100 TABLET ORAL 2 TIMES DAILY
Qty: 14 TABLET | Refills: 0 | Status: SHIPPED | OUTPATIENT
Start: 2023-11-15 | End: 2023-11-22

## 2023-11-15 NOTE — TELEPHONE ENCOUNTER
Last Written Prescription Date:  07/24/2018 (Patient will run out in 3 days)  Last Fill Quantity: 30,  # refills: 0   Last office visit: 5/24/2018 with prescribing provider:  05/24/2018   Future Office Visit:   Next 5 appointments (look out 90 days)     Jul 16, 2018  3:30 PM CDT   Return Visit with Jeanne Monroy LP   [facility] ([facility])    [address] [address] [phone]            Aug 20, 2018  4:00 PM CDT   SHORT with Osmar Rendon PA-C   [facility]    [address], [address]  [facility] [address] [phone]                 no

## 2023-11-16 LAB — GROUP A STREP BY PCR: NOT DETECTED

## 2023-11-16 NOTE — PROGRESS NOTES
URGENT CARE VISIT:    ASSESSMENT AND PLAN:      ICD-10-CM    1. Acute cough  R05.1 albuterol (PROAIR HFA/PROVENTIL HFA/VENTOLIN HFA) 108 (90 Base) MCG/ACT inhaler     benzonatate (TESSALON) 200 MG capsule      2. Chills  R68.83       3. Wheezing  R06.2 albuterol (PROAIR HFA/PROVENTIL HFA/VENTOLIN HFA) 108 (90 Base) MCG/ACT inhaler     benzonatate (TESSALON) 200 MG capsule      4. Sore throat  J02.9 Streptococcus A Rapid Screen w/Reflex to PCR - Clinic Collect          RST is negative today.  In light of chronic prednisone therapy and worsening cough with chills noted we will go ahead and treat her today with doxycycline twice a day for 7 days; side effects of medication, finishing full course, and use of probiotics was discussed.  Prescription for albuterol inhaler and Tessalon Perles given with side effects discussed with patient.  Supportive and OTC measures outlined in AVS and discussed.      Red flag symptoms for urgent evaluation via ED shared.      Follow up with primary care provider with any problems, questions or concerns or if symptoms worsen or fail to improve. Patient verbalized understanding and is agreeable to plan. The patient was discharged ambulatory and in stable condition.      SUBJECTIVE:   Bella Scott is a 37 year old female presenting for chief complaint of cough - non-productive.  Associated symptoms include intermittent wheezing during coughing bouts and sore throat.  She is currently being treated for chronic hives with chronic prednisone therapy.  Onset was 2 week(s) ago.   She denies the following symptoms: fever, runny nose, stuffy nose, shortness of breath, vomiting, and diarrhea  Course of illness is worsening.    Treatment measures tried include Tylenol/Ibuprofen           PMH:   Past Medical History:   Diagnosis Date    Anemia, unspecified 3/24/2003    iron deficiney per pt    Contraception     Unspecified hypothyroidism      Allergies: No known allergies   Medications:   Current  Outpatient Medications   Medication Sig Dispense Refill    albuterol (PROAIR HFA/PROVENTIL HFA/VENTOLIN HFA) 108 (90 Base) MCG/ACT inhaler Inhale 2 puffs into the lungs every 6 hours as needed for shortness of breath, wheezing or cough 18 g 0    benzonatate (TESSALON) 200 MG capsule Take 1 capsule (200 mg) by mouth 3 times daily as needed for cough 21 capsule 0    cetirizine (ZYRTEC) 10 MG tablet Take 1 tablet (10 mg) by mouth daily      Cholecalciferol (VITAMIN D3 GUMMIES ADULT PO)       levonorgestrel (MIRENA) 20 MCG/24HR IUD 1 each by Intrauterine route once      levothyroxine (SYNTHROID/LEVOTHROID) 112 MCG tablet Take 1 tablet (112 mcg) by mouth daily 90 tablet 3    predniSONE (DELTASONE) 20 MG tablet take 1 tablet by mouth twice a day as directed      venlafaxine (EFFEXOR XR) 75 MG 24 hr capsule Take 1 capsule (75 mg) by mouth daily 90 capsule 1     Social History:   Social History     Tobacco Use    Smoking status: Never     Passive exposure: Never    Smokeless tobacco: Never    Tobacco comments:     no 2nd hand smoke at home   Substance Use Topics    Alcohol use: Yes     Alcohol/week: 0.0 standard drinks of alcohol     Comment: once a month       ROS:  Review of systems negative except as stated above.    OBJECTIVE:  /84 (BP Location: Right arm, Patient Position: Chair, Cuff Size: Adult Regular)   Pulse 81   Temp 98.1  F (36.7  C) (Oral)   Resp 14   Wt 97.5 kg (215 lb)   SpO2 99%   BMI 32.69 kg/m      GENERAL APPEARANCE: healthy, alert and no distress  EYES: EOMI,  PERRL, conjunctiva clear  HENT: ear canals and TM's normal.  Nose and mouth without ulcers, erythema or lesions  NECK: supple, nontender, no lymphadenopathy  RESP: lungs clear to auscultation - no rales, rhonchi or wheezes  CV: regular rates and rhythm, normal S1 S2, no murmur noted  SKIN: no suspicious lesions or rashes    Labs:      Results for orders placed or performed in visit on 11/15/23 (from the past 24 hour(s))   Streptococcus  A Rapid Screen w/Reflex to PCR - Clinic Collect    Specimen: Throat; Swab   Result Value Ref Range    Group A Strep antigen Negative Negative

## 2023-11-16 NOTE — PATIENT INSTRUCTIONS
Please incorporate probiotics with yogurt to help prevent against GI side effects.    Understand a fever  Relax, lie down. Go to bed if you want. Just get off your feet and rest. Also, drink plenty of fluids to avoid dehydration.  Take acetaminophen or a nonsteroidal anti-inflammatory agent (NSAID), such as ibuprofen.  A fever is a normal reaction of your body to an illness. The temperature itself often isn t harmful. It actually helps your body fight infections. You don t need to treat a fever unless you feel very uncomfortable.   If the fever doesn t get better within 1 hour after you take acetaminophen, take ibuprofen. If this works, keep taking the ibuprofen every 6 to 8 hours.   If either medicine alone doesn t keep the fever down, you may switch off between the 2 medicines every 3 to 4 hours. For example, take ibuprofen. Wait 3 hours. Then take acetaminophen. Wait 3 hours. Take ibuprofen, and so on.  Treat a troubled nose kindly  Breathe steam or heated humidified air to open blocked nasal passages.  a hot shower or use a vaporizer. Be careful not to get burned by the steam.  Saline nasal sprays and decongestant tablets help open a stuffy nose. Antihistamines (Claritin, Zyrtec, etc.) can also help, but they can cause side effects such as drowsiness and drying of the eyes, nose, and mouth.  Nasal rinses such as Netti pot will help with the sinus congestion and nasal drainage.   Soothe a sore throat and cough  Gargle every 2 hours with 1/4 teaspoon of salt dissolved in 1/2 cup of warm water. Suck on throat lozenges and cough drops to moisten your throat.  Gargling with Chloraseptic spray   Cough medicines are available but it is unclear how effective they actually are.  Manuka Honey tbs for cough suppressant   Take acetaminophen or an NSAID, such as ibuprofen to ease throat pain  Humidifier in room where you are sleeping.   Ease digestive problems  Put fluid back into your body. Take frequent sips of  clear liquids such as water or broth. Do not drink beverages with a lot of sugar in them, such as juices and sodas. These can make diarrhea worse. Older children and adults can drink sports drinks.  Patient will need to drink at least 1.5-2 liters of fluids daily for adults and 1-1.5 liters for children. If vomiting and not tolerating liquids for more than 24 hrs, please go to your nearest emergency department for IV fluids and further treatment.   Maintain hydration by drinking small amounts of clear fluids frequently, then soft diet, and then advance diet as tolerated. May use any prescribed imodium if desired for any diarrhea. Call if symptoms worsen, high fever, severe weakness or fainting, increased abdominal pain, blood in stool or vomit, or failure to improve in 2-3 days.   As your appetite returns, you can resume your normal diet. Ask your doctor whether there are any foods you should avoid.  When to seek medical care  When you first notice symptoms, ask your health care provider if antiviral medications are appropriate. Antibiotics should not be taken for colds or flu. Also, call your doctor if you have any of the following symptoms or if you aren t feeling better after 7 days:  Shortness of breath  Pain or pressure in the chest or abdomen  Worsening symptoms, especially after a period of improvement  Fever that doesn t go down with medication  Sudden dizziness or confusion  Severe or continued vomiting  Signs of dehydration, including extreme thirst, dark urine, infrequent urination, dry mouth  Spotted, red, or very sore throat

## 2024-01-01 NOTE — TELEPHONE ENCOUNTER
Received prior auth APPROVAL for Contrave ER 8-90mg tablet effective 2/7/2018 - 6/7/2018.  Fax sent to abstracting.  Jacque Montez RN  
Based on # of Babies in Utero = <1> (2024 08:38:04)  Extramural Delivery = <No> (2024 17:15:39)  Gestational Age of Birth = <39w3d> (2024 10:17:56)  Number of Prenatal Care Visits = <11> (2024 08:23:55)  EFW = <3200> (2024 09:17:50)  Birthweight = *    * if criteria is not previously documented
Right leg DVT .2016 ? unknown cause f/u hematologist

## 2024-03-04 ENCOUNTER — OFFICE VISIT (OUTPATIENT)
Dept: ENDOCRINOLOGY | Facility: CLINIC | Age: 39
End: 2024-03-04
Payer: COMMERCIAL

## 2024-03-04 VITALS
BODY MASS INDEX: 35.58 KG/M2 | SYSTOLIC BLOOD PRESSURE: 116 MMHG | HEIGHT: 68 IN | RESPIRATION RATE: 16 BRPM | HEART RATE: 68 BPM | WEIGHT: 234.8 LBS | TEMPERATURE: 98.2 F | OXYGEN SATURATION: 99 % | DIASTOLIC BLOOD PRESSURE: 77 MMHG

## 2024-03-04 DIAGNOSIS — F41.9 ANXIETY: ICD-10-CM

## 2024-03-04 DIAGNOSIS — E66.812 CLASS 2 SEVERE OBESITY DUE TO EXCESS CALORIES WITH SERIOUS COMORBIDITY AND BODY MASS INDEX (BMI) OF 35.0 TO 35.9 IN ADULT (H): ICD-10-CM

## 2024-03-04 DIAGNOSIS — R63.5 ABNORMAL WEIGHT GAIN: Primary | ICD-10-CM

## 2024-03-04 DIAGNOSIS — E66.01 CLASS 2 SEVERE OBESITY DUE TO EXCESS CALORIES WITH SERIOUS COMORBIDITY AND BODY MASS INDEX (BMI) OF 35.0 TO 35.9 IN ADULT (H): ICD-10-CM

## 2024-03-04 DIAGNOSIS — E55.9 VITAMIN D DEFICIENCY: ICD-10-CM

## 2024-03-04 DIAGNOSIS — E03.4 HYPOTHYROIDISM DUE TO ACQUIRED ATROPHY OF THYROID: ICD-10-CM

## 2024-03-04 LAB — HBA1C MFR BLD: 5.1 % (ref 0–5.6)

## 2024-03-04 PROCEDURE — 84270 ASSAY OF SEX HORMONE GLOBUL: CPT | Performed by: PHYSICIAN ASSISTANT

## 2024-03-04 PROCEDURE — 80048 BASIC METABOLIC PNL TOTAL CA: CPT | Performed by: PHYSICIAN ASSISTANT

## 2024-03-04 PROCEDURE — 83036 HEMOGLOBIN GLYCOSYLATED A1C: CPT | Performed by: PHYSICIAN ASSISTANT

## 2024-03-04 PROCEDURE — 84439 ASSAY OF FREE THYROXINE: CPT | Performed by: PHYSICIAN ASSISTANT

## 2024-03-04 PROCEDURE — 84443 ASSAY THYROID STIM HORMONE: CPT | Performed by: PHYSICIAN ASSISTANT

## 2024-03-04 PROCEDURE — 82306 VITAMIN D 25 HYDROXY: CPT | Performed by: PHYSICIAN ASSISTANT

## 2024-03-04 PROCEDURE — 82533 TOTAL CORTISOL: CPT | Performed by: PHYSICIAN ASSISTANT

## 2024-03-04 PROCEDURE — 84403 ASSAY OF TOTAL TESTOSTERONE: CPT | Performed by: PHYSICIAN ASSISTANT

## 2024-03-04 PROCEDURE — 82024 ASSAY OF ACTH: CPT | Performed by: PHYSICIAN ASSISTANT

## 2024-03-04 PROCEDURE — 99204 OFFICE O/P NEW MOD 45 MIN: CPT | Performed by: PHYSICIAN ASSISTANT

## 2024-03-04 PROCEDURE — 84480 ASSAY TRIIODOTHYRONINE (T3): CPT | Performed by: PHYSICIAN ASSISTANT

## 2024-03-04 PROCEDURE — 36415 COLL VENOUS BLD VENIPUNCTURE: CPT | Performed by: PHYSICIAN ASSISTANT

## 2024-03-04 NOTE — LETTER
3/4/2024         RE: Bella Scott  [address]  [facility] [id number]        Dear Colleague,    Thank you for referring your patient, Bella Scott, to the [facility]. Please see a copy of my visit note below.    Assessment/Plan :   Hypothyroidism. Bella feels like her hormones are off. She has been struggling with fatigue, weight gain and brain fog. We discussed her previous laboratory results and we reviewed the significance of an elevated TPO antibody. She has Hashimoto's Thyroiditis. However, her thyroid hormone levels are stable. We also reviewed the difference between T4 supplementation and T3 supplementation. We will repeat thyroid levels today and we will adjust her medication, based on the results.  Weight gain. We discussed the various causes of weight gain. We also reviewed how her hormone levels can impact weight gain. Bella has taken phentermine in the past and it seemed to work well. Unfortunately, she experienced some heart palpitations and decided to discontinue the medication. That was about 3 yrs ago. She would like to look into other options for weight loss. We discussed phentermine and how it works with topiramate. We also discussed how once weekly GLP1 medications work. If her hormone levels are stable, we will move forward with weight loss medications.      I have independently reviewed and interpreted labs, imaging as indicated.      Chief complaint:  Bella is a 38 year old female seen in consultation at the request of Dr. Whitaker for hypothyroidism, fatigue, and weight gain.     I have reviewed Care Everywhere including [facility], [facility], [facility], [facility], [facility], [facility] , [facility], [place] lab reports, imaging reports and provider notes as indicated.      HISTORY OF PRESENT ILLNESS  Bella was originally diagnosed with thyroid disease about 15 yrs ago. She had been struggling with fatigue and weight gain, when laboratory testing  found her thyroid level to be off. She was started on levothyroxine and remains on levothyroxine today. She is not sure if she has ever felt 100%. Over the years, she has battled exhaustion, weight gain, and brain fog. She often jokes that she has early onset Alzheimer's because her memory can be so off.     About 2 months ago, she developed hives. These would appear every morning and no one seemed to know what was causing them. She went to her PCP and they found her to have elevated thyroid antibodies. She was not sure if that may have led to the hives. Her sister has Hashimoto's Thyroiditis, but Bella was never told what caused her thyroid disease. She also feels like ongoing symptoms have worsened. She tries to be very careful with her diet but everything seems to cause weight gain. She also feels like her fatigue has gotten to the point where her  and family are concerned.     Endocrine relevant labs are as follows:   Latest Reference Range & Units 10/18/23 15:30   Thyroid Peroxidase Antibody <35 IU/mL 95 (H)   (H): Data is abnormally high   Latest Reference Range & Units 10/18/23 15:30   TSH 0.30 - 4.20 uIU/mL 0.28 (L)   (L): Data is abnormally low   Latest Reference Range & Units 10/18/23 15:30   T4 Free 0.90 - 1.70 ng/dL 1.36       Relevant imaging is as follows: (as read by me as it pertains to endocrine relevant organs)  Narrative & Impression   US THYROID 4/22/2022 4:43 PM     CLINICAL HISTORY: Thyroid nodule.     TECHNIQUE: Thyroid ultrasound.      COMPARISON: 8/4/2017.     FINDINGS:  RIGHT lobe: 5 x 1.3 x 1.5 cm. Heterogeneous echotexture.  Isthmus: 2 mm.  LEFT lobe: 5 x 1.6 x 1.2 cm. Heterogeneous echotexture.     NODULES:     Nodule 1: Solid hyperechoic nodule in the mid right thyroid lobe has  decreased in size, measuring 1.1 x 0.7 x 0.5 cm (previously 1.5 x 1.3  x 0.6 cm).   Composition: Solid or almost completely solid, 2 points   Echogenicity: Hyperechoic or isoechoic, 1 point   Shape:  Wider-than-tall, 0 points   Margin: Smooth, 0 points   Echogenic Foci: None, or large comet-tail artifacts, 0 points   Point Total: 3 points. TI-RADS 3. If 2.5 cm or larger, recommend FNA;  if 1.5 cm or larger, recommend follow up US at 1, 3, and 5 years.      Nodule 2: Not seen on today's exam.                                                                      IMPRESSION:  1.  A solitary hyperechoic right thyroid nodule has decreased in size  compared to 8/4/2017.  2.  The thyroid parenchyma is heterogeneous, however no other discrete  thyroid nodules or masses are identified.     Nodules are characterized per:  ACR Thyroid Imaging, Reporting and Data System (TI-RADS): White Paper  of the ACR TI-RADS Committee  Francois Guerrero. et al. Journal of the American College of  Radiology 2017. Volume 14 (2017), Issue 5, 522-307.       ARIELLA MACKENZIE MD      REVIEW OF SYSTEMS    Endocrine: positive for thyroid disorder, hot flashes, and weight gain  Skin: negative for, hair loss on scalp, acne on face, facial redness  Eyes: negative for, visual blurring, redness, tearing  Ears/Nose/Throat: negative for, persistent sore throat, hoarseness, feeling of fullness  Respiratory: No shortness of breath, dyspnea on exertion, cough, or hemoptysis  Cardiovascular: negative for, chest pain, dyspnea on exertion, lower extremity edema, and exercise intolerance  Gastrointestinal: negative for, nausea, vomiting, constipation, and diarrhea  Genitourinary: negative for, nocturia, dysuria, frequency, and urgency  Musculoskeletal: positive for muscular weakness, negative for, nocturnal cramping, and foot pain  Neurologic: positive for local weakness and fatigue, negative for, numbness or tingling of hands, and numbness or tingling of feet  Psychiatric: positive for fatigue,anxiety and depression  Hematologic/Lymphatic/Immunologic: negative    Past Medical History  Past Medical History:   Diagnosis Date     Anemia, unspecified 3/24/2003  "   iron deficiney per pt     Contraception      Unspecified hypothyroidism        Medications  Current Outpatient Medications   Medication Sig Dispense Refill     cetirizine (ZYRTEC) 10 MG tablet Take 1 tablet (10 mg) by mouth daily       Cholecalciferol (VITAMIN D3 GUMMIES ADULT PO)        levonorgestrel (MIRENA) 20 MCG/24HR IUD 1 each by Intrauterine route once       levothyroxine (SYNTHROID/LEVOTHROID) 112 MCG tablet Take 1 tablet (112 mcg) by mouth daily 90 tablet 3     venlafaxine (EFFEXOR XR) 75 MG 24 hr capsule Take 1 capsule (75 mg) by mouth daily 90 capsule 1       Allergies  Allergies   Allergen Reactions     No Known Allergies          Family History  family history includes Cancer in her maternal grandmother; Diabetes in her maternal grandfather; Family History Negative in her brother and father; Heart Disease in her paternal grandfather; Other - See Comments in her paternal grandmother; Thyroid Disease in her mother and sister.    Social History  Social History     Tobacco Use     Smoking status: Never     Passive exposure: Never     Smokeless tobacco: Never     Tobacco comments:     no 2nd hand smoke at home   Vaping Use     Vaping Use: Never used   Substance Use Topics     Alcohol use: Yes     Alcohol/week: 0.0 standard drinks of alcohol     Comment: once a month     Drug use: No       Physical Exam  /77 (BP Location: Left arm, Patient Position: Chair, Cuff Size: Adult Regular)   Pulse 68   Temp 98.2  F (36.8  C) (Tympanic)   Resp 16   Ht 1.727 m (5' 7.99\")   Wt 106.5 kg (234 lb 12.8 oz)   LMP 02/29/2024   SpO2 99%   Breastfeeding No   BMI 35.71 kg/m    Body mass index is 35.71 kg/m .  GENERAL :  In no apparent distress  SKIN: Normal color, normal temperature, texture.  No hirsutism, alopecia or purple striae.     EYES: PERRL, EOMI, No scleral icterus,  No proptosis, conjunctival redness, stare, retraction  NECK: No visible masses. No palpable adenopathy, or masses. No carotid bruits. "   THYROID:  Normal, nontender, smooth / firm texture,  no nodules, no Bruit   RESP: Lungs clear to auscultation bilaterally  CARDIAC: Regular rate and rhythm, normal S1 S2, without murmurs, rubs or gallops    NEURO: awake, alert, responds appropriately to questions.  Cranial nerves intact.   Moves all extremities; Gait normal.  No tremor of the outstretched hand.    EXTREMITIES: No clubbing, cyanosis or edema.              Again, thank you for allowing me to participate in the care of your patient.        Sincerely,        Nadya Harrison PA-C

## 2024-03-04 NOTE — PROGRESS NOTES
Assessment/Plan :   Hypothyroidism. Bella feels like her hormones are off. She has been struggling with fatigue, weight gain and brain fog. We discussed her previous laboratory results and we reviewed the significance of an elevated TPO antibody. She has Hashimoto's Thyroiditis. However, her thyroid hormone levels are stable. We also reviewed the difference between T4 supplementation and T3 supplementation. We will repeat thyroid levels today and we will adjust her medication, based on the results.  Weight gain. We discussed the various causes of weight gain. We also reviewed how her hormone levels can impact weight gain. Bella has taken phentermine in the past and it seemed to work well. Unfortunately, she experienced some heart palpitations and decided to discontinue the medication. That was about 3 yrs ago. She would like to look into other options for weight loss. We discussed phentermine and how it works with topiramate. We also discussed how once weekly GLP1 medications work. If her hormone levels are stable, we will move forward with weight loss medications.      I have independently reviewed and interpreted labs, imaging as indicated.      Chief complaint:  Bella is a 38 year old female seen in consultation at the request of Dr. Whitaker for hypothyroidism, fatigue, and weight gain.     I have reviewed Care Everywhere including [facility], [facility], [facility] , [facility] lab reports, imaging reports and provider notes as indicated.      HISTORY OF PRESENT ILLNESS  Bella was originally diagnosed with thyroid disease about 15 yrs ago. She had been struggling with fatigue and weight gain, when laboratory testing found her thyroid level to be off. She was started on levothyroxine and remains on levothyroxine today. She is not sure if she has ever felt 100%. Over the years, she has battled exhaustion, weight gain, and brain fog. She often jokes that she has  early onset Alzheimer's because her memory can be so off.     About 2 months ago, she developed hives. These would appear every morning and no one seemed to know what was causing them. She went to her PCP and they found her to have elevated thyroid antibodies. She was not sure if that may have led to the hives. Her sister has Hashimoto's Thyroiditis, but Bella was never told what caused her thyroid disease. She also feels like ongoing symptoms have worsened. She tries to be very careful with her diet but everything seems to cause weight gain. She also feels like her fatigue has gotten to the point where her  and family are concerned.     Endocrine relevant labs are as follows:   Latest Reference Range & Units 10/18/23 15:30   Thyroid Peroxidase Antibody <35 IU/mL 95 (H)   (H): Data is abnormally high   Latest Reference Range & Units 10/18/23 15:30   TSH 0.30 - 4.20 uIU/mL 0.28 (L)   (L): Data is abnormally low   Latest Reference Range & Units 10/18/23 15:30   T4 Free 0.90 - 1.70 ng/dL 1.36       Relevant imaging is as follows: (as read by me as it pertains to endocrine relevant organs)  Narrative & Impression   US THYROID 4/22/2022 4:43 PM     CLINICAL HISTORY: Thyroid nodule.     TECHNIQUE: Thyroid ultrasound.      COMPARISON: 8/4/2017.     FINDINGS:  RIGHT lobe: 5 x 1.3 x 1.5 cm. Heterogeneous echotexture.  Isthmus: 2 mm.  LEFT lobe: 5 x 1.6 x 1.2 cm. Heterogeneous echotexture.     NODULES:     Nodule 1: Solid hyperechoic nodule in the mid right thyroid lobe has  decreased in size, measuring 1.1 x 0.7 x 0.5 cm (previously 1.5 x 1.3  x 0.6 cm).   Composition: Solid or almost completely solid, 2 points   Echogenicity: Hyperechoic or isoechoic, 1 point   Shape: Wider-than-tall, 0 points   Margin: Smooth, 0 points   Echogenic Foci: None, or large comet-tail artifacts, 0 points   Point Total: 3 points. TI-RADS 3. If 2.5 cm or larger, recommend FNA;  if 1.5 cm or larger, recommend follow up US at 1, 3, and 5  years.      Nodule 2: Not seen on today's exam.                                                                      IMPRESSION:  1.  A solitary hyperechoic right thyroid nodule has decreased in size  compared to 8/4/2017.  2.  The thyroid parenchyma is heterogeneous, however no other discrete  thyroid nodules or masses are identified.     Nodules are characterized per:  ACR Thyroid Imaging, Reporting and Data System (TI-RADS): White Paper  of the ACR TI-RADS Committee  Francois Guerrero et al. Journal of the American College of  Radiology 2017. Volume 14 (2017), Issue 5, 932-502.       ARIELLA MACKENZIE MD      REVIEW OF SYSTEMS    Endocrine: positive for thyroid disorder, hot flashes, and weight gain  Skin: negative for, hair loss on scalp, acne on face, facial redness  Eyes: negative for, visual blurring, redness, tearing  Ears/Nose/Throat: negative for, persistent sore throat, hoarseness, feeling of fullness  Respiratory: No shortness of breath, dyspnea on exertion, cough, or hemoptysis  Cardiovascular: negative for, chest pain, dyspnea on exertion, lower extremity edema, and exercise intolerance  Gastrointestinal: negative for, nausea, vomiting, constipation, and diarrhea  Genitourinary: negative for, nocturia, dysuria, frequency, and urgency  Musculoskeletal: positive for muscular weakness, negative for, nocturnal cramping, and foot pain  Neurologic: positive for local weakness and fatigue, negative for, numbness or tingling of hands, and numbness or tingling of feet  Psychiatric: positive for fatigue,anxiety and depression  Hematologic/Lymphatic/Immunologic: negative    Past Medical History  Past Medical History:   Diagnosis Date    Anemia, unspecified 3/24/2003    iron deficiney per pt    Contraception     Unspecified hypothyroidism        Medications  Current Outpatient Medications   Medication Sig Dispense Refill    cetirizine (ZYRTEC) 10 MG tablet Take 1 tablet (10 mg) by mouth daily       "Cholecalciferol (VITAMIN D3 GUMMIES ADULT PO)       levonorgestrel (MIRENA) 20 MCG/24HR IUD 1 each by Intrauterine route once      levothyroxine (SYNTHROID/LEVOTHROID) 112 MCG tablet Take 1 tablet (112 mcg) by mouth daily 90 tablet 3    venlafaxine (EFFEXOR XR) 75 MG 24 hr capsule Take 1 capsule (75 mg) by mouth daily 90 capsule 1       Allergies  Allergies   Allergen Reactions    No Known Allergies          Family History  family history includes Cancer in her maternal grandmother; Diabetes in her maternal grandfather; Family History Negative in her brother and father; Heart Disease in her paternal grandfather; Other - See Comments in her paternal grandmother; Thyroid Disease in her mother and sister.    Social History  Social History     Tobacco Use    Smoking status: Never     Passive exposure: Never    Smokeless tobacco: Never    Tobacco comments:     no 2nd hand smoke at home   Vaping Use    Vaping Use: Never used   Substance Use Topics    Alcohol use: Yes     Alcohol/week: 0.0 standard drinks of alcohol     Comment: once a month    Drug use: No       Physical Exam  /77 (BP Location: Left arm, Patient Position: Chair, Cuff Size: Adult Regular)   Pulse 68   Temp 98.2  F (36.8  C) (Tympanic)   Resp 16   Ht 1.727 m (5' 7.99\")   Wt 106.5 kg (234 lb 12.8 oz)   LMP 02/29/2024   SpO2 99%   Breastfeeding No   BMI 35.71 kg/m    Body mass index is 35.71 kg/m .  GENERAL :  In no apparent distress  SKIN: Normal color, normal temperature, texture.  No hirsutism, alopecia or purple striae.     EYES: PERRL, EOMI, No scleral icterus,  No proptosis, conjunctival redness, stare, retraction  NECK: No visible masses. No palpable adenopathy, or masses. No carotid bruits.   THYROID:  Normal, nontender, smooth / firm texture,  no nodules, no Bruit   RESP: Lungs clear to auscultation bilaterally  CARDIAC: Regular rate and rhythm, normal S1 S2, without murmurs, rubs or gallops    NEURO: awake, alert, responds " appropriately to questions.  Cranial nerves intact.   Moves all extremities; Gait normal.  No tremor of the outstretched hand.    EXTREMITIES: No clubbing, cyanosis or edema.

## 2024-03-04 NOTE — PATIENT INSTRUCTIONS
[facility]  Dr Domingo, Endocrinology Department    [address] [facility] [facility]  [place] [address]  Appointment [phone]  Fax: [phone]  [place]: Monday - Thursday

## 2024-03-05 LAB
ACTH PLAS-MCNC: 14 PG/ML
ANION GAP SERPL CALCULATED.3IONS-SCNC: 11 MMOL/L (ref 7–15)
BUN SERPL-MCNC: 16.4 MG/DL (ref 6–20)
CALCIUM SERPL-MCNC: 9.4 MG/DL (ref 8.6–10)
CHLORIDE SERPL-SCNC: 104 MMOL/L (ref 98–107)
CORTIS SERPL-MCNC: 5.1 UG/DL
CREAT SERPL-MCNC: 0.75 MG/DL (ref 0.51–0.95)
DEPRECATED HCO3 PLAS-SCNC: 26 MMOL/L (ref 22–29)
EGFRCR SERPLBLD CKD-EPI 2021: >90 ML/MIN/1.73M2
GLUCOSE SERPL-MCNC: 83 MG/DL (ref 70–99)
POTASSIUM SERPL-SCNC: 4.2 MMOL/L (ref 3.4–5.3)
SHBG SERPL-SCNC: 48 NMOL/L (ref 30–135)
SODIUM SERPL-SCNC: 141 MMOL/L (ref 135–145)
T3 SERPL-MCNC: 95 NG/DL (ref 85–202)
T4 FREE SERPL-MCNC: 1.34 NG/DL (ref 0.9–1.7)
TSH SERPL DL<=0.005 MIU/L-ACNC: 0.41 UIU/ML (ref 0.3–4.2)
VIT D+METAB SERPL-MCNC: 23 NG/ML (ref 20–50)

## 2024-03-06 LAB
TESTOST FREE SERPL-MCNC: 0.13 NG/DL
TESTOST SERPL-MCNC: 9 NG/DL (ref 8–60)

## 2024-03-11 ENCOUNTER — MYC MEDICAL ADVICE (OUTPATIENT)
Dept: ENDOCRINOLOGY | Facility: CLINIC | Age: 39
End: 2024-03-11
Payer: COMMERCIAL

## 2024-03-11 DIAGNOSIS — E03.4 HYPOTHYROIDISM DUE TO ACQUIRED ATROPHY OF THYROID: Primary | ICD-10-CM

## 2024-03-12 DIAGNOSIS — E66.812 CLASS 2 SEVERE OBESITY DUE TO EXCESS CALORIES WITH SERIOUS COMORBIDITY AND BODY MASS INDEX (BMI) OF 35.0 TO 35.9 IN ADULT (H): Primary | ICD-10-CM

## 2024-03-12 DIAGNOSIS — E66.01 CLASS 2 SEVERE OBESITY DUE TO EXCESS CALORIES WITH SERIOUS COMORBIDITY AND BODY MASS INDEX (BMI) OF 35.0 TO 35.9 IN ADULT (H): Primary | ICD-10-CM

## 2024-03-15 RX ORDER — LIOTHYRONINE SODIUM 5 UG/1
5 TABLET ORAL DAILY
Qty: 30 TABLET | Refills: 2 | Status: SHIPPED | OUTPATIENT
Start: 2024-03-15 | End: 2024-06-03

## 2024-03-27 ENCOUNTER — MYC MEDICAL ADVICE (OUTPATIENT)
Dept: ENDOCRINOLOGY | Facility: CLINIC | Age: 39
End: 2024-03-27
Payer: COMMERCIAL

## 2024-03-27 DIAGNOSIS — E66.01 CLASS 2 SEVERE OBESITY DUE TO EXCESS CALORIES WITH SERIOUS COMORBIDITY AND BODY MASS INDEX (BMI) OF 35.0 TO 35.9 IN ADULT (H): Primary | ICD-10-CM

## 2024-03-27 DIAGNOSIS — E66.812 CLASS 2 SEVERE OBESITY DUE TO EXCESS CALORIES WITH SERIOUS COMORBIDITY AND BODY MASS INDEX (BMI) OF 35.0 TO 35.9 IN ADULT (H): Primary | ICD-10-CM

## 2024-03-27 RX ORDER — PHENTERMINE HYDROCHLORIDE 37.5 MG/1
37.5 TABLET ORAL
Qty: 30 TABLET | Refills: 3 | Status: SHIPPED | OUTPATIENT
Start: 2024-03-27 | End: 2024-07-29

## 2024-03-27 NOTE — TELEPHONE ENCOUNTER
Last OV 3/4/24  Next OV 6/2024  From note;  Weight gain. We discussed the various causes of weight gain. We also reviewed how her hormone levels can impact weight gain. Bella has taken phentermine in the past and it seemed to work well. Unfortunately, she experienced some heart palpitations and decided to discontinue the medication. That was about 3 yrs ago. She would like to look into other options for weight loss. We discussed phentermine and how it works with topiramate. We also discussed how once weekly GLP1 medications work. If her hormone levels are stable, we will move forward with weight loss medications.

## 2024-04-06 DIAGNOSIS — E03.4 HYPOTHYROIDISM DUE TO ACQUIRED ATROPHY OF THYROID: ICD-10-CM

## 2024-04-11 RX ORDER — LIOTHYRONINE SODIUM 5 UG/1
5 TABLET ORAL DAILY
Qty: 90 TABLET | Refills: 1 | OUTPATIENT
Start: 2024-04-11

## 2024-04-11 NOTE — TELEPHONE ENCOUNTER
Requested Prescriptions   Pending Prescriptions Disp Refills    liothyronine (CYTOMEL) 5 MCG tablet [Pharmacy Med Name: LIOTHYRONINE SOD 5 MCG TAB] 90 tablet 1     Sig: TAKE 1 TABLET BY MOUTH DAILY.       Thyroid Protocol Failed - 4/11/2024  6:39 AM        Failed - Medication indicated for associated diagnosis     Medication is associated with one or more of the following diagnoses:  Hypothyroidism  Thyroid stimulating hormone suppression therapy  Thyroid cancer          Passed - Patient is 12 years or older        Passed - Recent (12 mo) or future (30 days) visit within the authorizing provider's specialty     The patient must have completed an in-person or virtual visit within the past 12 months or has a future visit scheduled within the next 90 days with the authorizing provider s specialty.  Urgent care and e-visits do not quality as an office visit for this protocol.          Passed - Medication is active on med list        Passed - Normal TSH on file in past 12 months     Recent Labs   Lab Test 03/04/24  0952   TSH 0.41              Passed - No active pregnancy on record     If patient is pregnant or has had a positive pregnancy test, please check TSH.          Passed - No positive pregnancy test in past 12 months     If patient is pregnant or has had a positive pregnancy test, please check TSH.                Collaborative Care (CoCM)  Progress Note    Type of Interaction: In Office    Start Time: 12:56 PM    End Time: 2:00 PM        Appointment: Scheduled    Reason for Visit:   Chief Complaint   Patient presents with    Follow-up        Interval History / Patient Symptoms:     Patient Health Questionnaire-9 Score: 13 (7/28/2023  3:00 PM)  DMITRI-7 Total Score: 8 (7/28/2023  3:00 PM)        Interventions Provided: Psychoeducation and Acceptance & Commitment Therapy      Progress Made: slight    Response to Intervention: Explored patient's recent functioning, struggles with depression/anxiety symptoms, and identified ways to manage emotions.    Patient was tearful at times, having a difficult time understanding why she is responding to situations that she typically would not be upset.  After discussion with her PCP, patient increased her sertraline to 75mg and is hoping to see a difference in her ability to manage emotions.         Plan:   Appointment in 2 weeks; if needing to come in sooner due to symptoms, call the office.      Follow Up / Next Appointment: Next appointment: 08/14/23

## 2024-04-29 ENCOUNTER — E-VISIT (OUTPATIENT)
Dept: FAMILY MEDICINE | Facility: CLINIC | Age: 39
End: 2024-04-29
Payer: COMMERCIAL

## 2024-04-29 DIAGNOSIS — F33.0 MILD EPISODE OF RECURRENT MAJOR DEPRESSIVE DISORDER (H): ICD-10-CM

## 2024-04-29 DIAGNOSIS — F41.9 ANXIETY: ICD-10-CM

## 2024-04-29 DIAGNOSIS — E03.4 HYPOTHYROIDISM DUE TO ACQUIRED ATROPHY OF THYROID: Primary | ICD-10-CM

## 2024-04-29 PROCEDURE — 99207 PR NON-BILLABLE SERV PER CHARTING: CPT | Performed by: PHYSICIAN ASSISTANT

## 2024-04-29 ASSESSMENT — ANXIETY QUESTIONNAIRES
3. WORRYING TOO MUCH ABOUT DIFFERENT THINGS: NOT AT ALL
2. NOT BEING ABLE TO STOP OR CONTROL WORRYING: NOT AT ALL
4. TROUBLE RELAXING: NOT AT ALL
GAD7 TOTAL SCORE: 2
8. IF YOU CHECKED OFF ANY PROBLEMS, HOW DIFFICULT HAVE THESE MADE IT FOR YOU TO DO YOUR WORK, TAKE CARE OF THINGS AT HOME, OR GET ALONG WITH OTHER PEOPLE?: SOMEWHAT DIFFICULT
5. BEING SO RESTLESS THAT IT IS HARD TO SIT STILL: NOT AT ALL
6. BECOMING EASILY ANNOYED OR IRRITABLE: SEVERAL DAYS
GAD7 TOTAL SCORE: 2
7. FEELING AFRAID AS IF SOMETHING AWFUL MIGHT HAPPEN: SEVERAL DAYS
1. FEELING NERVOUS, ANXIOUS, OR ON EDGE: NOT AT ALL
7. FEELING AFRAID AS IF SOMETHING AWFUL MIGHT HAPPEN: SEVERAL DAYS
GAD7 TOTAL SCORE: 2

## 2024-04-29 ASSESSMENT — PATIENT HEALTH QUESTIONNAIRE - PHQ9
SUM OF ALL RESPONSES TO PHQ QUESTIONS 1-9: 7
10. IF YOU CHECKED OFF ANY PROBLEMS, HOW DIFFICULT HAVE THESE PROBLEMS MADE IT FOR YOU TO DO YOUR WORK, TAKE CARE OF THINGS AT HOME, OR GET ALONG WITH OTHER PEOPLE: SOMEWHAT DIFFICULT
SUM OF ALL RESPONSES TO PHQ QUESTIONS 1-9: 7

## 2024-04-30 RX ORDER — VENLAFAXINE HYDROCHLORIDE 37.5 MG/1
37.5 CAPSULE, EXTENDED RELEASE ORAL DAILY
Qty: 30 CAPSULE | Refills: 1 | Status: SHIPPED | OUTPATIENT
Start: 2024-04-30 | End: 2024-06-05

## 2024-04-30 ASSESSMENT — ANXIETY QUESTIONNAIRES: GAD7 TOTAL SCORE: 2

## 2024-04-30 ASSESSMENT — PATIENT HEALTH QUESTIONNAIRE - PHQ9: SUM OF ALL RESPONSES TO PHQ QUESTIONS 1-9: 7

## 2024-04-30 NOTE — TELEPHONE ENCOUNTER
Provider E-Visit time total (minutes): 5        6/22/2023     1:52 PM 9/25/2023     9:44 AM 4/29/2024     3:51 PM   PHQ   PHQ-9 Total Score 8 5 7   Q9: Thoughts of better off dead/self-harm past 2 weeks Not at all Not at all Not at all         6/22/2023     1:44 PM 9/25/2023     9:45 AM 4/29/2024     3:51 PM   DESIREE-7 SCORE   Total Score 5 (mild anxiety) 1 (minimal anxiety) 2 (minimal anxiety)   Total Score 5 1 2

## 2024-05-20 ENCOUNTER — OFFICE VISIT (OUTPATIENT)
Dept: FAMILY MEDICINE | Facility: CLINIC | Age: 39
End: 2024-05-20
Payer: COMMERCIAL

## 2024-05-20 VITALS
HEIGHT: 68 IN | WEIGHT: 230.8 LBS | RESPIRATION RATE: 16 BRPM | OXYGEN SATURATION: 97 % | SYSTOLIC BLOOD PRESSURE: 118 MMHG | TEMPERATURE: 98.2 F | BODY MASS INDEX: 34.98 KG/M2 | DIASTOLIC BLOOD PRESSURE: 80 MMHG | HEART RATE: 80 BPM

## 2024-05-20 DIAGNOSIS — F33.0 MILD EPISODE OF RECURRENT MAJOR DEPRESSIVE DISORDER (H): ICD-10-CM

## 2024-05-20 DIAGNOSIS — F41.9 ANXIETY: Primary | ICD-10-CM

## 2024-05-20 DIAGNOSIS — E66.811 CLASS 1 OBESITY WITHOUT SERIOUS COMORBIDITY WITH BODY MASS INDEX (BMI) OF 34.0 TO 34.9 IN ADULT, UNSPECIFIED OBESITY TYPE: ICD-10-CM

## 2024-05-20 DIAGNOSIS — E03.4 HYPOTHYROIDISM DUE TO ACQUIRED ATROPHY OF THYROID: ICD-10-CM

## 2024-05-20 LAB
T3 SERPL-MCNC: 126 NG/DL (ref 85–202)
T4 FREE SERPL-MCNC: 1.34 NG/DL (ref 0.9–1.7)
TSH SERPL DL<=0.005 MIU/L-ACNC: 0.12 UIU/ML (ref 0.3–4.2)

## 2024-05-20 PROCEDURE — 84439 ASSAY OF FREE THYROXINE: CPT | Performed by: PHYSICIAN ASSISTANT

## 2024-05-20 PROCEDURE — 84443 ASSAY THYROID STIM HORMONE: CPT | Performed by: PHYSICIAN ASSISTANT

## 2024-05-20 PROCEDURE — 84480 ASSAY TRIIODOTHYRONINE (T3): CPT | Performed by: PHYSICIAN ASSISTANT

## 2024-05-20 PROCEDURE — G2211 COMPLEX E/M VISIT ADD ON: HCPCS | Performed by: PHYSICIAN ASSISTANT

## 2024-05-20 PROCEDURE — 36415 COLL VENOUS BLD VENIPUNCTURE: CPT | Performed by: PHYSICIAN ASSISTANT

## 2024-05-20 PROCEDURE — 99214 OFFICE O/P EST MOD 30 MIN: CPT | Performed by: PHYSICIAN ASSISTANT

## 2024-05-20 PROCEDURE — 96127 BRIEF EMOTIONAL/BEHAV ASSMT: CPT | Performed by: PHYSICIAN ASSISTANT

## 2024-05-20 RX ORDER — TOPIRAMATE 25 MG/1
25 TABLET, FILM COATED ORAL 2 TIMES DAILY
Qty: 60 TABLET | Refills: 0 | Status: SHIPPED | OUTPATIENT
Start: 2024-05-20 | End: 2024-06-12

## 2024-05-20 ASSESSMENT — ANXIETY QUESTIONNAIRES
IF YOU CHECKED OFF ANY PROBLEMS ON THIS QUESTIONNAIRE, HOW DIFFICULT HAVE THESE PROBLEMS MADE IT FOR YOU TO DO YOUR WORK, TAKE CARE OF THINGS AT HOME, OR GET ALONG WITH OTHER PEOPLE: SOMEWHAT DIFFICULT
2. NOT BEING ABLE TO STOP OR CONTROL WORRYING: NOT AT ALL
7. FEELING AFRAID AS IF SOMETHING AWFUL MIGHT HAPPEN: SEVERAL DAYS
1. FEELING NERVOUS, ANXIOUS, OR ON EDGE: SEVERAL DAYS
7. FEELING AFRAID AS IF SOMETHING AWFUL MIGHT HAPPEN: SEVERAL DAYS
GAD7 TOTAL SCORE: 3
6. BECOMING EASILY ANNOYED OR IRRITABLE: SEVERAL DAYS
8. IF YOU CHECKED OFF ANY PROBLEMS, HOW DIFFICULT HAVE THESE MADE IT FOR YOU TO DO YOUR WORK, TAKE CARE OF THINGS AT HOME, OR GET ALONG WITH OTHER PEOPLE?: SOMEWHAT DIFFICULT
3. WORRYING TOO MUCH ABOUT DIFFERENT THINGS: NOT AT ALL
4. TROUBLE RELAXING: NOT AT ALL
5. BEING SO RESTLESS THAT IT IS HARD TO SIT STILL: NOT AT ALL
GAD7 TOTAL SCORE: 3

## 2024-05-20 ASSESSMENT — PAIN SCALES - GENERAL: PAINLEVEL: NO PAIN (0)

## 2024-05-20 ASSESSMENT — PATIENT HEALTH QUESTIONNAIRE - PHQ9: SUM OF ALL RESPONSES TO PHQ QUESTIONS 1-9: 7

## 2024-05-20 NOTE — PROGRESS NOTES
"  Assessment & Plan     Anxiety  Mild episode of recurrent major depressive disorder (H24)  Will continue to wean off effexor.  Mood stable.  Discussed using something like wellbutrin or buspar if needed in the future (both are on her [employer] report).    Class 1 obesity without serious comorbidity with body mass index (BMI) of 34.0 to 34.9 in adult, unspecified obesity type  Adding topamax to phentermine.  Follow up with weight clinic in July.  - topiramate (TOPAMAX) 25 MG tablet; Take 1 tablet (25 mg) by mouth 2 times daily    Hypothyroidism due to acquired atrophy of thyroid  Checking labs- patient see's Endo.  - TSH; Future  - T3, total; Future  - T4, free; Future  - TSH  - T3, total  - T4, free          BMI  Estimated body mass index is 35.09 kg/m  as calculated from the following:    Height as of this encounter: 1.727 m (5' 8\").    Weight as of this encounter: 104.7 kg (230 lb 12.8 oz).   Weight management plan: Discussed healthy diet and exercise guidelines    The longitudinal plan of care for the diagnosis(es)/condition(s) as documented were addressed during this visit. Due to the added complexity in care, I will continue to support Bella in the subsequent management and with ongoing continuity of care.      Subjective   Bella is a 38 year old, presenting for the following health issues:  Recheck Medication (Depression and anxiety /And thryoid) and Thyroid Problem        5/20/2024     9:58 AM   Additional Questions   Roomed by Emelyn LEACH MA   Accompanied by self         5/20/2024     9:58 AM   Patient Reported Additional Medications   Patient reports taking the following new medications none     History of Present Illness       Mental Health Follow-up:  Patient presents to follow-up on Depression & Anxiety.Patient's depression since last visit has been:  Better  The patient is not having other symptoms associated with depression.  Patient's anxiety since last visit has been:  Better  The patient is not having " other symptoms associated with anxiety.  Any significant life events: No  Patient is not feeling anxious or having panic attacks.  Patient has no concerns about alcohol or drug use.    Hypothyroidism:     Since last visit, patient describes the following symptoms::  Anxiety, Constipation, Depression, Dry skin, Fatigue, Weight gain and Weight loss    Weight gain::  Less than 5 lbs.    She eats 2-3 servings of fruits and vegetables daily.She consumes 1 sweetened beverage(s) daily.She exercises with enough effort to increase her heart rate 10 to 19 minutes per day.  She exercises with enough effort to increase her heart rate 3 or less days per week.   She is taking medications regularly.      Depression and Anxiety   How are you doing with your depression since your last visit? The same  How are you doing with your anxiety since your last visit?  Better   Are you having other symptoms that might be associated with depression or anxiety? No  Have you had a significant life event? No   Do you have any concerns with your use of alcohol or other drugs? No    Social History     Tobacco Use    Smoking status: Never     Passive exposure: Never    Smokeless tobacco: Never    Tobacco comments:     no 2nd hand smoke at home   Vaping Use    Vaping status: Never Used   Substance Use Topics    Alcohol use: Yes     Alcohol/week: 0.0 standard drinks of alcohol     Comment: once a month    Drug use: No         6/22/2023     1:52 PM 9/25/2023     9:44 AM 4/29/2024     3:51 PM   PHQ   PHQ-9 Total Score 8 5 7   Q9: Thoughts of better off dead/self-harm past 2 weeks Not at all Not at all Not at all         9/25/2023     9:45 AM 4/29/2024     3:51 PM 5/20/2024     9:53 AM   DESIREE-7 SCORE   Total Score 1 (minimal anxiety) 2 (minimal anxiety) 3 (minimal anxiety)   Total Score 1 2 3         4/29/2024     3:51 PM   Last PHQ-9   1.  Little interest or pleasure in doing things 2   2.  Feeling down, depressed, or hopeless 1   3.  Trouble falling or  "staying asleep, or sleeping too much 0   4.  Feeling tired or having little energy 3   5.  Poor appetite or overeating 0   6.  Feeling bad about yourself 1   7.  Trouble concentrating 0   8.  Moving slowly or restless 0   Q9: Thoughts of better off dead/self-harm past 2 weeks 0   PHQ-9 Total Score 7         5/20/2024     9:53 AM   DESIREE-7    1. Feeling nervous, anxious, or on edge 1   2. Not being able to stop or control worrying 0   3. Worrying too much about different things 0   4. Trouble relaxing 0   5. Being so restless that it is hard to sit still 0   6. Becoming easily annoyed or irritable 1   7. Feeling afraid, as if something awful might happen 1   DESIREE-7 Total Score 3   If you checked any problems, how difficult have they made it for you to do your work, take care of things at home, or get along with other people? Somewhat difficult     Weaning off effexor.  Was up to 150mg and now down to 37.5mg daily.  Mood stable so far.  No having side effects to weaning.    Phentermine- started back on this after being off for a while.  now tablets this time rather than capsules.  Used to get heart racing when previously on it.  Not having as much energy boost as she used to, appetite is suppressed however and she thinks she has lost some weight.    She has a weight loss appointment at the end of July to discuss more options for her struggle with her weight.        Review of Systems  Constitutional, HEENT, cardiovascular, pulmonary, gi and gu systems are negative, except as otherwise noted.      Objective    /80 (BP Location: Right arm, Patient Position: Sitting, Cuff Size: Adult Large)   Pulse 80   Temp 98.2  F (36.8  C) (Oral)   Resp 16   Ht 1.727 m (5' 8\")   Wt 104.7 kg (230 lb 12.8 oz)   LMP 05/18/2024   SpO2 97%   BMI 35.09 kg/m    Body mass index is 35.09 kg/m .  Physical Exam   GENERAL: alert and no distress  NECK: no adenopathy, no asymmetry, masses, or scars  CV: regular rate and rhythm, normal S1 " S2, no S3 or S4, no murmur, click or rub, no peripheral edema  MS: no gross musculoskeletal defects noted, no edema  PSYCH: mentation appears normal, affect normal/bright            Signed Electronically by: Osmar Rendon PA-C

## 2024-05-28 ENCOUNTER — MYC MEDICAL ADVICE (OUTPATIENT)
Dept: ENDOCRINOLOGY | Facility: CLINIC | Age: 39
End: 2024-05-28
Payer: COMMERCIAL

## 2024-05-28 DIAGNOSIS — E03.4 HYPOTHYROIDISM DUE TO ACQUIRED ATROPHY OF THYROID: ICD-10-CM

## 2024-05-28 RX ORDER — LEVOTHYROXINE SODIUM 100 UG/1
100 TABLET ORAL DAILY
Qty: 90 TABLET | Refills: 0 | Status: SHIPPED | OUTPATIENT
Start: 2024-05-28 | End: 2024-08-12

## 2024-05-28 NOTE — TELEPHONE ENCOUNTER
Labs 5/20/24  TSH 0.12  T4 ad T3 total WNL      3/4/24:  We will repeat thyroid levels today and we will adjust her medication, based on the results.     new prescription for liothyronine 5 mcg. Take it in the morning along with the levothyroxine

## 2024-06-01 DIAGNOSIS — F33.0 MILD EPISODE OF RECURRENT MAJOR DEPRESSIVE DISORDER (H): ICD-10-CM

## 2024-06-01 DIAGNOSIS — E03.4 HYPOTHYROIDISM DUE TO ACQUIRED ATROPHY OF THYROID: ICD-10-CM

## 2024-06-01 DIAGNOSIS — F41.9 ANXIETY: ICD-10-CM

## 2024-06-03 RX ORDER — LIOTHYRONINE SODIUM 5 UG/1
5 TABLET ORAL DAILY
Qty: 30 TABLET | Refills: 2 | Status: SHIPPED | OUTPATIENT
Start: 2024-06-03 | End: 2024-06-24

## 2024-06-03 NOTE — TELEPHONE ENCOUNTER
Requested Prescriptions   Pending Prescriptions Disp Refills    liothyronine (CYTOMEL) 5 MCG tablet [Pharmacy Med Name: LIOTHYRONINE SOD 5 MCG TAB] 30 tablet 2     Sig: TAKE 1 TABLET BY MOUTH DAILY.       Thyroid Protocol Failed - 6/1/2024  6:37 AM        Failed - Medication indicated for associated diagnosis     Medication is associated with one or more of the following diagnoses:  Hypothyroidism  Thyroid stimulating hormone suppression therapy  Thyroid cancer          Failed - Normal TSH on file in past 12 months     Recent Labs   Lab Test 05/20/24  1035   TSH 0.12*              Passed - Patient is 12 years or older        Passed - Recent (12 mo) or future (30 days) visit within the authorizing provider's specialty     The patient must have completed an in-person or virtual visit within the past 12 months or has a future visit scheduled within the next 90 days with the authorizing provider s specialty.  Urgent care and e-visits do not quality as an office visit for this protocol.          Passed - Medication is active on med list        Passed - No active pregnancy on record     If patient is pregnant or has had a positive pregnancy test, please check TSH.          Passed - No positive pregnancy test in past 12 months     If patient is pregnant or has had a positive pregnancy test, please check TSH.

## 2024-06-04 ENCOUNTER — MYC MEDICAL ADVICE (OUTPATIENT)
Dept: FAMILY MEDICINE | Facility: CLINIC | Age: 39
End: 2024-06-04

## 2024-06-04 NOTE — TELEPHONE ENCOUNTER
[facility] and sent [facility] message.    Tea Kaur RN on 6/4/2024 at 12:02 PM

## 2024-06-04 NOTE — TELEPHONE ENCOUNTER
Is she weaning off this?  Or off it by now?  Please call to confirm if refill is needed.    [email or web address]

## 2024-06-05 RX ORDER — VENLAFAXINE HYDROCHLORIDE 37.5 MG/1
37.5 CAPSULE, EXTENDED RELEASE ORAL DAILY
Qty: 30 CAPSULE | Refills: 1 | Status: SHIPPED | OUTPATIENT
Start: 2024-06-05 | End: 2024-07-24

## 2024-06-12 DIAGNOSIS — E66.811 CLASS 1 OBESITY WITHOUT SERIOUS COMORBIDITY WITH BODY MASS INDEX (BMI) OF 34.0 TO 34.9 IN ADULT, UNSPECIFIED OBESITY TYPE: ICD-10-CM

## 2024-06-13 RX ORDER — TOPIRAMATE 25 MG/1
25 TABLET, FILM COATED ORAL 2 TIMES DAILY
Qty: 60 TABLET | Refills: 2 | Status: SHIPPED | OUTPATIENT
Start: 2024-06-13

## 2024-06-16 ENCOUNTER — HEALTH MAINTENANCE LETTER (OUTPATIENT)
Age: 39
End: 2024-06-16

## 2024-06-24 ENCOUNTER — OFFICE VISIT (OUTPATIENT)
Dept: ENDOCRINOLOGY | Facility: CLINIC | Age: 39
End: 2024-06-24
Payer: COMMERCIAL

## 2024-06-24 VITALS
HEART RATE: 88 BPM | DIASTOLIC BLOOD PRESSURE: 81 MMHG | OXYGEN SATURATION: 98 % | RESPIRATION RATE: 16 BRPM | BODY MASS INDEX: 34.3 KG/M2 | SYSTOLIC BLOOD PRESSURE: 122 MMHG | WEIGHT: 226.3 LBS | HEIGHT: 68 IN | TEMPERATURE: 99 F

## 2024-06-24 DIAGNOSIS — E66.01 CLASS 2 SEVERE OBESITY DUE TO EXCESS CALORIES WITH SERIOUS COMORBIDITY AND BODY MASS INDEX (BMI) OF 35.0 TO 35.9 IN ADULT (H): Primary | ICD-10-CM

## 2024-06-24 DIAGNOSIS — E03.4 HYPOTHYROIDISM DUE TO ACQUIRED ATROPHY OF THYROID: ICD-10-CM

## 2024-06-24 DIAGNOSIS — E66.812 CLASS 2 SEVERE OBESITY DUE TO EXCESS CALORIES WITH SERIOUS COMORBIDITY AND BODY MASS INDEX (BMI) OF 35.0 TO 35.9 IN ADULT (H): Primary | ICD-10-CM

## 2024-06-24 PROCEDURE — 99214 OFFICE O/P EST MOD 30 MIN: CPT | Performed by: PHYSICIAN ASSISTANT

## 2024-06-24 RX ORDER — LIOTHYRONINE SODIUM 5 UG/1
10 TABLET ORAL DAILY
Qty: 60 TABLET | Refills: 2 | Status: SHIPPED | OUTPATIENT
Start: 2024-06-24 | End: 2024-08-06

## 2024-06-24 NOTE — PATIENT INSTRUCTIONS
[facility]  Dr Domingo, Endocrinology Department    [address] [id number]  [place] [address]  Appointment [phone]  Fax: [phone]  [place]: Monday - Thursday

## 2024-06-24 NOTE — LETTER
6/24/2024      Bella Scott  [address]  [facility] [address]      Dear Colleague,    Thank you for referring your patient, Bella Scott, to the [facility]. Please see a copy of my visit note below.    Assessment/Plan :   Hypothyroidism. Bella feels like things are stable but she continues to struggle with fatigue. She states that she can fall asleep at any time. She did notice a slight improvement with the addition of liothyronine but it was short lived. We reviewed her recent laboratory results and I would like to increase the liothyronine to 10 mcg daily. I will send in a new prescription today and we will follow-up with repeat testing in 4-6 wks.   Weight gain. Bella is doing well on phentermine. She has not had any problems with heart palpitations and she has noticed an impact on her appetite. We reviewed her chart and she has lost almost 10 lbs. We will continue with phentermine, for now. We also reviewed ho topiramate can help with phentermine and we reviewed the possible adverse effects. She will give it another try. She has a follow-up with the weight loss clinic in July.      I have independently reviewed and interpreted labs, imaging as indicated.      Chief complaint:  Bella is a 38 year old female who returns for follow-up of hypothyroidism.    I have reviewed Care Everywhere including [facility], [facility], [facility], [facility], [facility] , [facility], [place] lab reports, imaging reports and provider notes as indicated.      HISTORY OF PRESENT ILLNESS  Bella continues to struggle with fatigue. She reports that she noticed a slight improvement after starting the liothyronine but the improvement was short lived. She has continued to take 5 mcg daily but she is not sure that it is doing much. We also recently decreased the levothyroxine from 112 mcg down to 100 mcg. She has not had any problems since changing the dose.     Bella has also continued  to work on weight loss. She has been taking phentermine 37.5 mg daily. She feels like the tablets are helping to limit her appetite. She has also not had any problems with nausea. Her primary care provider started her on topiramate, as well, but she has not been able to tolerate the medication. She took one tablet and it immediately caused a headache, so she has not taken another tablet.    Bella was originally diagnosed with thyroid disease about 15 yrs ago. She had been struggling with fatigue and weight gain, when laboratory testing found her thyroid level to be off. She was started on levothyroxine and remains on levothyroxine today. She is not sure if she has ever felt 100%. Over the years, she has battled exhaustion, weight gain, and brain fog. She understands the importance of weight loss but she has not been able to lose weight through diet and exercise alone.    Endocrine relevant labs are as follows:   Latest Reference Range & Units 05/20/24 10:35   TSH 0.30 - 4.20 uIU/mL 0.12 (L)   (L): Data is abnormally low   Latest Reference Range & Units 05/20/24 10:35   T4 Free 0.90 - 1.70 ng/dL 1.34      Latest Reference Range & Units 03/04/24 09:52   TSH 0.30 - 4.20 uIU/mL 0.41      Latest Reference Range & Units 03/04/24 09:52   T4 Free 0.90 - 1.70 ng/dL 1.34     REVIEW OF SYSTEMS    Endocrine: positive for thyroid disorder and obesity  Skin: negative  Eyes: negative for, visual blurring  Ears/Nose/Throat: negative  Respiratory: No shortness of breath, dyspnea on exertion, cough, or hemoptysis  Cardiovascular: negative for, irregular heart beat, chest pain, lower extremity edema, and exercise intolerance  Gastrointestinal: positive for nausea, negative for, vomiting, constipation, and diarrhea  Genitourinary: negative for, nocturia, dysuria, frequency, and urgency  Musculoskeletal: negative for, nocturnal cramping, and foot pain  Neurologic: positive for headaches, negative for, local weakness, and numbness or  tingling of feet  Psychiatric: negative  Hematologic/Lymphatic/Immunologic: negative    Past Medical History  Past Medical History:   Diagnosis Date     Anemia, unspecified 3/24/2003    iron deficiney per pt     Contraception      Unspecified hypothyroidism        Medications  Current Outpatient Medications   Medication Sig Dispense Refill     cetirizine (ZYRTEC) 10 MG tablet Take 1 tablet (10 mg) by mouth daily       Cholecalciferol (VITAMIN D3 GUMMIES ADULT PO)        levonorgestrel (MIRENA) 20 MCG/24HR IUD 1 each by Intrauterine route once       levothyroxine (SYNTHROID/LEVOTHROID) 100 MCG tablet Take 1 tablet (100 mcg) by mouth daily 90 tablet 0     liothyronine (CYTOMEL) 5 MCG tablet TAKE 1 TABLET BY MOUTH DAILY. 30 tablet 2     phentermine (ADIPEX-P) 37.5 MG tablet Take 1 tablet (37.5 mg) by mouth every morning (before breakfast) 30 tablet 3     topiramate (TOPAMAX) 25 MG tablet Take 1 tablet (25 mg) by mouth 2 times daily 60 tablet 2     venlafaxine (EFFEXOR XR) 37.5 MG 24 hr capsule TAKE 1 CAPSULE BY MOUTH EVERY DAY 30 capsule 1       Allergies  Allergies   Allergen Reactions     No Known Allergies          Family History  family history includes Cancer in her maternal grandmother; Diabetes in her maternal grandfather; Family History Negative in her brother and father; Heart Disease in her paternal grandfather; Other - See Comments in her paternal grandmother; Thyroid Disease in her mother and sister.    Social History  Social History     Tobacco Use     Smoking status: Never     Passive exposure: Never     Smokeless tobacco: Never     Tobacco comments:     no 2nd hand smoke at home   Vaping Use     Vaping status: Never Used   Substance Use Topics     Alcohol use: Yes     Alcohol/week: 0.0 standard drinks of alcohol     Comment: once a month     Drug use: No       Physical Exam  /81 (BP Location: Left arm, Patient Position: Chair, Cuff Size: Adult Large)   Pulse 88   Temp 99  F (37.2  C) (Tympanic)   " Resp 16   Ht 1.727 m (5' 7.99\")   Wt 102.6 kg (226 lb 4.8 oz)   LMP 06/10/2024 (Approximate)   SpO2 98%   Breastfeeding No   BMI 34.42 kg/m    There is no height or weight on file to calculate BMI.  GENERAL :  In no apparent distress  SKIN: Normal color, normal temperature, texture.  No hirsutism, alopecia or purple striae.     EYES: PERRL, EOMI, No scleral icterus,  No proptosis, conjunctival redness, stare, retraction  NECK: No visible masses. No palpable adenopathy, or masses. No carotid bruits.   THYROID:  Normal, nontender, smooth / firm texture,  no nodules, no Bruit   RESP: Lungs clear to auscultation bilaterally  CARDIAC: Regular rate and rhythm, normal S1 S2, without murmurs, rubs or gallops       NEURO: awake, alert, responds appropriately to questions.  Cranial nerves intact.   Moves all extremities; Gait normal.  No tremor of the outstretched hand.    EXTREMITIES: No clubbing, cyanosis or edema.              Again, thank you for allowing me to participate in the care of your patient.        Sincerely,        Nadya Harrison PA-C  "

## 2024-06-24 NOTE — PROGRESS NOTES
Assessment/Plan :   Hypothyroidism. Bella feels like things are stable but she continues to struggle with fatigue. She states that she can fall asleep at any time. She did notice a slight improvement with the addition of liothyronine but it was short lived. We reviewed her recent laboratory results and I would like to increase the liothyronine to 10 mcg daily. I will send in a new prescription today and we will follow-up with repeat testing in 4-6 wks.   Weight gain. Bella is doing well on phentermine. She has not had any problems with heart palpitations and she has noticed an impact on her appetite. We reviewed her chart and she has lost almost 10 lbs. We will continue with phentermine, for now. We also reviewed ho topiramate can help with phentermine and we reviewed the possible adverse effects. She will give it another try. She has a follow-up with the weight loss clinic in July.      I have independently reviewed and interpreted labs, imaging as indicated.      Chief complaint:  Bella is a 38 year old female who returns for follow-up of hypothyroidism.    I have reviewed Care Everywhere including [facility], [facility], [facility], [facility], [facility] , [facility], [place] lab reports, imaging reports and provider notes as indicated.      HISTORY OF PRESENT ILLNESS  Bella continues to struggle with fatigue. She reports that she noticed a slight improvement after starting the liothyronine but the improvement was short lived. She has continued to take 5 mcg daily but she is not sure that it is doing much. We also recently decreased the levothyroxine from 112 mcg down to 100 mcg. She has not had any problems since changing the dose.     Bella has also continued to work on weight loss. She has been taking phentermine 37.5 mg daily. She feels like the tablets are helping to limit her appetite. She has also not had any problems with nausea. Her primary care provider started her on topiramate, as  well, but she has not been able to tolerate the medication. She took one tablet and it immediately caused a headache, so she has not taken another tablet.    Bella was originally diagnosed with thyroid disease about 15 yrs ago. She had been struggling with fatigue and weight gain, when laboratory testing found her thyroid level to be off. She was started on levothyroxine and remains on levothyroxine today. She is not sure if she has ever felt 100%. Over the years, she has battled exhaustion, weight gain, and brain fog. She understands the importance of weight loss but she has not been able to lose weight through diet and exercise alone.    Endocrine relevant labs are as follows:   Latest Reference Range & Units 05/20/24 10:35   TSH 0.30 - 4.20 uIU/mL 0.12 (L)   (L): Data is abnormally low   Latest Reference Range & Units 05/20/24 10:35   T4 Free 0.90 - 1.70 ng/dL 1.34      Latest Reference Range & Units 03/04/24 09:52   TSH 0.30 - 4.20 uIU/mL 0.41      Latest Reference Range & Units 03/04/24 09:52   T4 Free 0.90 - 1.70 ng/dL 1.34     REVIEW OF SYSTEMS    Endocrine: positive for thyroid disorder and obesity  Skin: negative  Eyes: negative for, visual blurring  Ears/Nose/Throat: negative  Respiratory: No shortness of breath, dyspnea on exertion, cough, or hemoptysis  Cardiovascular: negative for, irregular heart beat, chest pain, lower extremity edema, and exercise intolerance  Gastrointestinal: positive for nausea, negative for, vomiting, constipation, and diarrhea  Genitourinary: negative for, nocturia, dysuria, frequency, and urgency  Musculoskeletal: negative for, nocturnal cramping, and foot pain  Neurologic: positive for headaches, negative for, local weakness, and numbness or tingling of feet  Psychiatric: negative  Hematologic/Lymphatic/Immunologic: negative    Past Medical History  Past Medical History:   Diagnosis Date    Anemia, unspecified 3/24/2003    iron deficiney per pt    Contraception     Unspecified  "hypothyroidism        Medications  Current Outpatient Medications   Medication Sig Dispense Refill    cetirizine (ZYRTEC) 10 MG tablet Take 1 tablet (10 mg) by mouth daily      Cholecalciferol (VITAMIN D3 GUMMIES ADULT PO)       levonorgestrel (MIRENA) 20 MCG/24HR IUD 1 each by Intrauterine route once      levothyroxine (SYNTHROID/LEVOTHROID) 100 MCG tablet Take 1 tablet (100 mcg) by mouth daily 90 tablet 0    liothyronine (CYTOMEL) 5 MCG tablet TAKE 1 TABLET BY MOUTH DAILY. 30 tablet 2    phentermine (ADIPEX-P) 37.5 MG tablet Take 1 tablet (37.5 mg) by mouth every morning (before breakfast) 30 tablet 3    topiramate (TOPAMAX) 25 MG tablet Take 1 tablet (25 mg) by mouth 2 times daily 60 tablet 2    venlafaxine (EFFEXOR XR) 37.5 MG 24 hr capsule TAKE 1 CAPSULE BY MOUTH EVERY DAY 30 capsule 1       Allergies  Allergies   Allergen Reactions    No Known Allergies          Family History  family history includes Cancer in her maternal grandmother; Diabetes in her maternal grandfather; Family History Negative in her brother and father; Heart Disease in her paternal grandfather; Other - See Comments in her paternal grandmother; Thyroid Disease in her mother and sister.    Social History  Social History     Tobacco Use    Smoking status: Never     Passive exposure: Never    Smokeless tobacco: Never    Tobacco comments:     no 2nd hand smoke at home   Vaping Use    Vaping status: Never Used   Substance Use Topics    Alcohol use: Yes     Alcohol/week: 0.0 standard drinks of alcohol     Comment: once a month    Drug use: No       Physical Exam  /81 (BP Location: Left arm, Patient Position: Chair, Cuff Size: Adult Large)   Pulse 88   Temp 99  F (37.2  C) (Tympanic)   Resp 16   Ht 1.727 m (5' 7.99\")   Wt 102.6 kg (226 lb 4.8 oz)   LMP 06/10/2024 (Approximate)   SpO2 98%   Breastfeeding No   BMI 34.42 kg/m    There is no height or weight on file to calculate BMI.  GENERAL :  In no apparent distress  SKIN: Normal " color, normal temperature, texture.  No hirsutism, alopecia or purple striae.     EYES: PERRL, EOMI, No scleral icterus,  No proptosis, conjunctival redness, stare, retraction  NECK: No visible masses. No palpable adenopathy, or masses. No carotid bruits.   THYROID:  Normal, nontender, smooth / firm texture,  no nodules, no Bruit   RESP: Lungs clear to auscultation bilaterally  CARDIAC: Regular rate and rhythm, normal S1 S2, without murmurs, rubs or gallops       NEURO: awake, alert, responds appropriately to questions.  Cranial nerves intact.   Moves all extremities; Gait normal.  No tremor of the outstretched hand.    EXTREMITIES: No clubbing, cyanosis or edema.

## 2024-07-24 ENCOUNTER — OFFICE VISIT (OUTPATIENT)
Dept: SURGERY | Facility: CLINIC | Age: 39
End: 2024-07-24
Payer: COMMERCIAL

## 2024-07-24 ENCOUNTER — ALLIED HEALTH/NURSE VISIT (OUTPATIENT)
Dept: SURGERY | Facility: CLINIC | Age: 39
End: 2024-07-24
Payer: COMMERCIAL

## 2024-07-24 VITALS — BODY MASS INDEX: 34.56 KG/M2 | WEIGHT: 228 LBS | HEIGHT: 68 IN

## 2024-07-24 VITALS
SYSTOLIC BLOOD PRESSURE: 126 MMHG | WEIGHT: 228 LBS | HEART RATE: 88 BPM | OXYGEN SATURATION: 98 % | HEIGHT: 68 IN | BODY MASS INDEX: 34.56 KG/M2 | DIASTOLIC BLOOD PRESSURE: 86 MMHG

## 2024-07-24 DIAGNOSIS — E66.811 CLASS 1 OBESITY DUE TO EXCESS CALORIES WITH SERIOUS COMORBIDITY AND BODY MASS INDEX (BMI) OF 34.0 TO 34.9 IN ADULT: Primary | ICD-10-CM

## 2024-07-24 DIAGNOSIS — F33.0 MILD EPISODE OF RECURRENT MAJOR DEPRESSIVE DISORDER (H): ICD-10-CM

## 2024-07-24 DIAGNOSIS — F41.9 ANXIETY: ICD-10-CM

## 2024-07-24 DIAGNOSIS — K21.9 GASTROESOPHAGEAL REFLUX DISEASE, UNSPECIFIED WHETHER ESOPHAGITIS PRESENT: ICD-10-CM

## 2024-07-24 DIAGNOSIS — E66.09 CLASS 1 OBESITY DUE TO EXCESS CALORIES WITH SERIOUS COMORBIDITY AND BODY MASS INDEX (BMI) OF 34.0 TO 34.9 IN ADULT: Primary | ICD-10-CM

## 2024-07-24 DIAGNOSIS — E78.00 HYPERCHOLESTEREMIA: ICD-10-CM

## 2024-07-24 PROBLEM — E66.812 CLASS 2 SEVERE OBESITY DUE TO EXCESS CALORIES WITH SERIOUS COMORBIDITY IN ADULT (H): Status: RESOLVED | Noted: 2024-03-04 | Resolved: 2024-07-24

## 2024-07-24 PROBLEM — E66.01 CLASS 2 SEVERE OBESITY DUE TO EXCESS CALORIES WITH SERIOUS COMORBIDITY IN ADULT (H): Status: RESOLVED | Noted: 2024-03-04 | Resolved: 2024-07-24

## 2024-07-24 PROCEDURE — 99204 OFFICE O/P NEW MOD 45 MIN: CPT | Performed by: PHYSICIAN ASSISTANT

## 2024-07-24 PROCEDURE — 97802 MEDICAL NUTRITION INDIV IN: CPT

## 2024-07-24 ASSESSMENT — SLEEP AND FATIGUE QUESTIONNAIRES
HOW LIKELY ARE YOU TO NOD OFF OR FALL ASLEEP WHEN YOU ARE A PASSENGER IN A CAR FOR AN HOUR WITHOUT A BREAK: WOULD NEVER DOZE
HOW LIKELY ARE YOU TO NOD OFF OR FALL ASLEEP WHILE LYING DOWN TO REST IN THE AFTERNOON WHEN CIRCUMSTANCES PERMIT: HIGH CHANCE OF DOZING
HOW LIKELY ARE YOU TO NOD OFF OR FALL ASLEEP WHILE WATCHING TV: WOULD NEVER DOZE
HOW LIKELY ARE YOU TO NOD OFF OR FALL ASLEEP IN A CAR, WHILE STOPPED FOR A FEW MINUTES IN TRAFFIC: WOULD NEVER DOZE
HOW LIKELY ARE YOU TO NOD OFF OR FALL ASLEEP WHILE SITTING QUIETLY AFTER LUNCH WITHOUT ALCOHOL: WOULD NEVER DOZE
HOW LIKELY ARE YOU TO NOD OFF OR FALL ASLEEP WHILE SITTING AND TALKING TO SOMEONE: WOULD NEVER DOZE
HOW LIKELY ARE YOU TO NOD OFF OR FALL ASLEEP WHILE SITTING INACTIVE IN A PUBLIC PLACE: WOULD NEVER DOZE
HOW LIKELY ARE YOU TO NOD OFF OR FALL ASLEEP WHILE SITTING AND READING: SLIGHT CHANCE OF DOZING

## 2024-07-24 NOTE — PATIENT INSTRUCTIONS
"Thank you for allowing me the privilege of caring for you.   We hope we provided you with the excellent service you deserve.     To ensure the quality of our services you may receive a patient satisfaction survey from an independent monitoring company.  The greatest compliment you can give is \"Likely to Recommend\"      Below is our plan we discussed.-  CARMITA Parham    - continue phentermine  - consider re-starting topiramate 12.5 mg by mouth at night. From there you can increase topiramate 25 mg at night or 25 mg BID.   - obtain liver fibroscan for consideration of GLP coverage    Goals:   - 3 meals a day following [employer]  - Intentional physical activity 150 min a week  - 60+ oz a water a day    Please chedule a follow up in 3 months.  If you need to reach me sooner you can do so by calling [phone].    Have a great day!       Please call [phone] and schedule a follow up with Prasad Mckinley PA-C in 3 months.  If you need to reach me sooner you can do so by calling [phone].    Have a great day!     Eat Better ? Move More ? Live Well    Eat 3 nutrient-rich meals each day    Don t skip meals--it will cause you to overeat later in the day!    Eating fiber (vegetables/fruits/whole grains) and protein with meals helps you stay full longer    Choose foods with less than 10 grams of sugar and 5 grams of fat per serving to prevent excess calories and weight re-gain  Eat around the same times each day to develop a routine eating schedule   Avoid snacking unless physically hungry.   Planned snacks: 1-2 times per day and no more than 150 calories    Eat protein first   Protein helps with healing, maintaining adequate muscle mass, reducing hunger and optimizing nutritional status   Aim for 60-80 grams of protein per day   Fill up on Fiber   Fiber comes from plants--fruits, veggies, whole grains, nuts/seeds and beans   Fiber is low in calories, high in phytonutrients and helps you stay full longer   Aim for 25-35 " grams per day by eating fiber with meals and snacks  Eat [id number]   Take 20-30 minutes to eat each meal by taking small bites, chewing foods to applesauce consistency or 20-30 times before you swallow   Eating foods too fast can delay satiety/fullness signals and increase overeating   Slow down your eating by using toddler utensils, putting your fork/spoon down between bites and not watching TV or emailing during meals!   Keep a Journal         Writing down what you eat, how you feel and when you are active helps you identify new changes to work on from week to week         Look for ways to cut 100 calories from your current diet 2-3 times per day  Drink 64 ounces of 0-Calorie drinks between meals   Water   Zero calorie Propel  or Vitamin Water     [employer]  Zero Calories   Crystal Light , Sugar-Free Juan-Aid , and other sugar-free lemonade or flavored talbert   Keep Caffeine to less than 300mg per day ie: 3-6oz cups coffee     Work up to 45-60 minutes of physical activity most days of the week   Helps with losing weight and prevent regaining those extra pounds!    Do a combo of cardio (walking/water exercises) and strength training (lifting weights/Vinyasa yoga)    Avoid Mindless Eating   Be present when you eat--take note of the smell, taste and quality of your food   Make a list of alternative activities you could do to prevent eating out of boredom/stress  Go for a walk, call a friend, chew gum, paint your nails, re-organize the garage, etc

## 2024-07-24 NOTE — Clinical Note
Please schedule for fibroscan to assess for fatty liver per [employer] employee GLP coverage requirements. thanks

## 2024-07-24 NOTE — PROGRESS NOTES
"New Medical Weight Management Consult        PATIENT:  Bella Scott  MRN:         [id number]  :         1985  ROMAINE:         2024      Dear Osmar Rendon PA-C,    I had the pleasure of seeing your patient, Bella Scott. Full intake/assessment was done to determine barriers to weight loss success and develop a treatment plan. Bella Scott is a 38 year old female interested in treatment of medical problems associated with excess weight. She has a height of 5' 8\", a weight of 228 lbs 0 oz, and the calculated Body mass index is 34.67 kg/m .    [employer] on [employer]. Notes being a \"self declared food addict.\" Has gained weight following 2 pregnancies. Is currently taking phentermine for the 3rd time. Has had decreased food noise and successful weight loss in the past. However, it has not been as helpful over the past 3 months. Was also recently prescribed 25 mg topiramate BID but stopped taking it after one dose due to headache.       ASSESSMENT & PLAN:    Problem List Items Addressed This Visit       Hypercholesteremia    Class 1 obesity due to excess calories with serious comorbidity and body mass index (BMI) of 34.0 to 34.9 in adult - Primary    Relevant Medications    omeprazole (PRILOSEC) 20 MG DR capsule    Other Relevant Orders    Fibrosis Scan     Other Visit Diagnoses       Gastroesophageal reflux disease, unspecified whether esophagitis present        Relevant Medications    omeprazole (PRILOSEC) 20 MG DR capsule             PROGRAM OVERVIEW  Reviewed options at [facility] including provider visits, dietician, 24 week healthy lifestyle program, health coaching, food supplements, Get Moving program, and psychological support.  All questions about weight loss program were answered.     MEDICATIONS:  We discussed healthy habits to assist with weight loss. We reviewed medications associated with weight gain. We discussed the role of pharmacological agents " "in the treatment of obesity and the \"off-label\" use of medications in this practice. We reviewed medication that may assist with weight loss. Indications, contraindications, risks/benefits, and potential side effects were discussed.   Continue Phentermine and Topiramate given by primary.  Discussed that medications must always be used together with lifestyle changes such as improvements in diet choices, portion control and establishing and maintaining a regular exercise program.     AOM Considerations:  Phentermine: Currently taking 37.5 mg in AM which she has been on x 3 months with 6 lb weight loss. Has also been on this one other time in the past. Has had heart palpitations in the past on pill vs capsule version.   Topiramate: On medication list as 25 mg BID, but has not started due to trying it once and getting a headache the next day. Caution due to venlafaxine  GLP-1: Candidate      Naltrexone: Candidate   Wellbutrin: On venlafaxine    Metformin: Candidate            PATIENT INSTRUCTIONS:  Today, patient was instructed to retry topiramate taking 1/2 tablet at bedtime to see if that improves side effects and helps with weight loss. If still has side effects should stop taking and let original prescriber know  A liver ultrasound will be ordered to look for fatty liver disease to see if she would be a candidate for GLP-1 coverage per [employer] Employee requirements.     If patient is a candidate for a GLP per [employer] insurance requirements will discuss more at next visti.          Follow up: Return to clinic in 3 months with Josefina Moya or Trice Parsons PA-C due to ROCHELLE      45 minutes spent on the date of the encounter doing chart review, review of test results, patient visit and documentation       She has the following co-morbidities:        7/24/2024     2:24 AM   --   I have the following health issues associated with obesity High Cholesterol    GERD (Reflux)    Hypothyroidism   I have the following symptoms " "associated with obesity Knee Pain    Depression    Back Pain    Fatigue    Groin Rash           7/24/2024     2:24 AM   Patient Goals   If yes, please indicate which surgery? Tubal ligation           7/24/2024     2:24 AM   Referring Provider   Please name the provider who referred you to Medical Weight Management  If you do not know, please answer \"I Don't Know\" Nadya Carrillo MD Endocrine           7/24/2024     2:24 AM   Weight History   How concerned are you about your weight? Very Concerned   I became overweight After Pregnancy   The following factors have contributed to my weight gain Mental Health Issues    Eating Wrong Types of Food    Eating Too Much    Lack of Exercise    Stress   I have tried the following methods to lose weight Watching Portions or Calories    Exercise    Atkins-type Diet (Low Carb/High Protein)    Medications   My lowest weight since age 17 was 155   My highest weight since age 17 was 235   The most weight I have ever lost was (lbs) 50   I have the following family history of obesity/being overweight I am the only one in my immediate family who is overweight    My mother is overweight   How has your weight changed over the last year? Gained   How many pounds? 20           7/24/2024     2:24 AM   Diet Recall Review with Patient   If you do eat breakfast, what types of food do you eat? Cereal and milk, eggs, cinnamon raisin english muffins, brueggers bagel, coffee with cream, donuts   If you do eat lunch, what types of food do you typically eat? A sandwich, rotisserie chicken, mac n chs or pasta rigoberto if my kids have it, or eat a late breakfast at 11 and just a snack around 2, trky stix and string chs, lean cuisine at work   If you do eat supper, what types of food do you typically eat? Chicken and caesar salad, chicken burgers and veggies, pizza, taco salads, grilled sandwiches   If you do snack, what types of food do you typically eat? String chs, yogurt with fruit and nuts, baked goods, " sweets, hardboiled eggs, lunch meat, protein shakes, Applesauce, fruit   How many glasses of juice do you drink in a typical day? 0   How many of glasses of milk do you drink in a typical day? 1   If you do drink milk, what type? 1%   How many 8oz glasses of sugar containing drinks such as Juan-Aid/sweet tea do you drink in a day? 0   How many cans/bottles of sugar pop/soda/tea/sports drinks do you drink in a day? 0   How many cans/bottles of diet pop/soda/tea or sports drink do you drink in a day? 1   How often do you have a drink of alcohol? Monthly or Less   If you do drink, how many drinks might you have in a day? 1 or 2           7/24/2024     2:24 AM   Eating Habits   Generally, my meals include foods like these bread, pasta, rice, potatoes, corn, crackers, sweet dessert, pop, or juice Almost Everyday   Generally, my meals include foods like these fried meats, brats, burgers, french fries, pizza, cheese, chips, or ice cream Almost Everyday   Eat fast food (like McDonalds, Burger Fuad, Taco Bell) Less Than Weekly   Eat at a buffet or sit-down restaurant Less Than Weekly   Eat most of my meals in front of the TV or computer A Few Times a Week   Often skip meals, eat at random times, have no regular eating times A Few Times a Week   Rarely sit down for a meal but snack or graze throughout Less Than Weekly   Eat extra snacks between meals A Few Times a Week   Eat most of my food at the end of the day Less Than Weekly   Eat in the middle of the night or wake up at night to eat Never   Eat extra snacks to prevent or correct low blood sugar Never   Eat to prevent acid reflux or stomach pain Less Than Weekly   Worry about not having enough food to eat Never   I eat when I am depressed A Few Times a Week   I eat when I am stressed A Few Times a Week   I eat when I am bored A Few Times a Week   I eat when I am anxious A Few Times a Week   I eat when I am happy or as a reward A Few Times a Week   I feel hungry all the  time even if I just have eaten Almost Everyday   Feeling full is important to me Everyday   I finish all the food on my plate even if I am already full A Few Times a Week   I can't resist eating delicious food or walk past the good food/smell Almost Everyday   I eat/snack without noticing that I am eating Less Than Weekly   I eat when I am preparing the meal Less Than Weekly   I eat more than usual when I see others eating Once a Week   I have trouble not eating sweets, ice cream, cookies, or chips if they are around the house Almost Everyday   I think about food all day Almost Everyday   What foods, if any, do you crave? Sweets/Candy/Chocolate   Please list any other foods you crave? Fruit, bread, cereal           7/24/2024     2:24 AM   Amount of Food   I feel out of control when eating Weekly   I eat a large amount of food, like a loaf of bread, a box of cookies, a pint/quart of ice cream, all at once Never   I eat a large amount of food even when I am not hungry Never   I eat rapidly Monthly   I eat alone because I feel embarrassed and do not want others to see how much I have eaten Monthly   I eat until I am uncomfortably full Monthly   I feel bad, disgusted, or guilty after I overeat Weekly   W: 0930  B: 5105-1236 eggs and english muffin and bowl of cereal (maple rasin bran)   L: 1400 sandwich or hot dog  D: meat and vegetable   Snacks: minimal - yogurt fruit nuts; beef stick + string cheese; herbalife shake  [place]: Bowl of ice cream, couple oreos        7/24/2024     2:24 AM   Activity/Exercise History   How much of a typical 12 hour day do you spend sitting? Half the Day   How much of a typical 12 hour day do you spend lying down? Less Than Half the Day   How much of a typical day do you spend walking/standing? Half the Day   How many hours (not including work) do you spend on the TV/Video Games/Computer/Tablet/Phone? 2-3 Hours   How many times a week are you active for the purpose of exercise? 2-3 Times a  Week   What keeps you from being more active? Too tired   How many total minutes do you spend doing some activity for the purpose of exercising when you exercise? 15-30 Minutes   Has not been exercising as much over the past few months due to lack of motivation.     PAST MEDICAL HISTORY:  Past Medical History:   Diagnosis Date    Anemia, unspecified 3/24/2003    iron deficiney per pt    Contraception     Unspecified hypothyroidism            7/24/2024     2:24 AM   Work/Social History Reviewed With Patient   My employment status is Part-Time   My job is NICU RN   How much of your job is spent on the computer or phone? Less Than 50%   How many hours do you spend commuting to work daily? 2 hrs/wk   What is your marital status? /In a Relationship   If in a relationship, is your significant other overweight? No   If you have children, are they overweight? No   Who do you live with? My  and our 2 kids   Who does the food shopping? Me       Social History     Tobacco Use    Smoking status: Never     Passive exposure: Never    Smokeless tobacco: Never    Tobacco comments:     no 2nd hand smoke at home   Vaping Use    Vaping status: Never Used   Substance Use Topics    Alcohol use: Yes     Alcohol/week: 0.0 standard drinks of alcohol     Comment: once a month    Drug use: No            7/24/2024     2:24 AM   Mental Health History Reviewed With Patient   Have you ever been physically or sexually abused? No   How often in the past 2 weeks have you felt little interest or pleasure in doing things? More Than Half the Days   Over the past 2 weeks how often have you felt down, depressed, or hopeless? For Several Days           7/24/2024     2:24 AM   Sleep History Reviewed With Patient   How many hours do you sleep at night? 7       MEDICATIONS:   Current Outpatient Medications   Medication Sig Dispense Refill    cetirizine (ZYRTEC) 10 MG tablet Take 1 tablet (10 mg) by mouth daily      Cholecalciferol (VITAMIN D3  GUMMIES ADULT PO)       levonorgestrel (MIRENA) 20 MCG/24HR IUD 1 each by Intrauterine route once      levothyroxine (SYNTHROID/LEVOTHROID) 100 MCG tablet Take 1 tablet (100 mcg) by mouth daily 90 tablet 0    liothyronine (CYTOMEL) 5 MCG tablet Take 2 tablets (10 mcg) by mouth daily 60 tablet 2    omeprazole (PRILOSEC) 20 MG DR capsule Take 1 capsule (20 mg) by mouth daily for 60 days 60 capsule 0    phentermine (ADIPEX-P) 37.5 MG tablet Take 1 tablet (37.5 mg) by mouth every morning (before breakfast) 30 tablet 3    topiramate (TOPAMAX) 25 MG tablet Take 1 tablet (25 mg) by mouth 2 times daily 60 tablet 2    venlafaxine (EFFEXOR XR) 37.5 MG 24 hr capsule TAKE 1 CAPSULE BY MOUTH EVERY DAY 30 capsule 1       ALLERGIES:   Allergies   Allergen Reactions    No Known Allergies        ROS:  HEENT  H/O glaucoma:  no  Cardiovascular  CAD:   no  Palpitations:   Yes, on tablet form of phentermine - resolved with capsule form  HTN:    no  Gastrointestinal  GERD:   Yes, takes Pepcid 3 times a week with moderate symptom resolution   Constipation:   no  Liver Dz:   no  H/O Pancreatitis:  no  H/O Gallbladder Dz: no  Psychiatric  Moods Stable:  yes  Anxiety:   yes  Depression:  yes  Bipolar:  no  H/O ETOH/Drug Use: no  H/O eating disorder: no  Endocrine  PMH/FMH of MTC or MEN2:  no  Neurologic:  H/O seizures:   no  Headaches:  yes  Memory Impairment:  no    H/O kidney stones:  no  Kidney disease:  no  Current birth control:  Yes        LABS/RECORDS REVIEWED:  Hemoglobin A1C   Date Value Ref Range Status   03/04/2024 5.1 0.0 - 5.6 % Final     Comment:     Normal <5.7%   Prediabetes 5.7-6.4%    Diabetes 6.5% or higher     Note: Adopted from ADA consensus guidelines.   04/22/2016 5.6 4.3 - 6.0 % Final     Vitamin D Deficiency screening   Date Value Ref Range Status   11/27/2020 29 20 - 75 ug/L Final     Comment:     Season, race, dietary intake, and treatment affect the concentration of   25-hydroxy-Vitamin D. Values may decrease  during winter months and increase   during summer months. Values 20-29 ug/L may indicate Vitamin D insufficiency   and values <20 ug/L may indicate Vitamin D deficiency.  Vitamin D determination is routinely performed by an immunoassay specific for   25 hydroxyvitamin D3.  If an individual is on vitamin D2 (ergocalciferol)   supplementation, please specify 25 OH vitamin D2 and D3 level determination by   LCMSMS test [id number].       Vitamin D, Total (25-Hydroxy)   Date Value Ref Range Status   03/04/2024 23 20 - 50 ng/mL Final     Comment:     optimum levels   05/11/2023 23 20 - 75 ug/L Final     TSH   Date Value Ref Range Status   05/20/2024 0.12 (L) 0.30 - 4.20 uIU/mL Final   07/06/2022 0.48 0.40 - 4.00 mU/L Final   03/24/2021 1.18 0.40 - 4.00 mU/L Final     Sodium   Date Value Ref Range Status   03/04/2024 141 135 - 145 mmol/L Final     Comment:     Reference intervals for this test were updated on 09/26/2023 to more accurately reflect our healthy population. There may be differences in the flagging of prior results with similar values performed with this method. Interpretation of those prior results can be made in the context of the updated reference intervals.    11/07/2017 140 133 - 144 mmol/L Final     Potassium   Date Value Ref Range Status   03/04/2024 4.2 3.4 - 5.3 mmol/L Final   04/18/2022 4.0 3.4 - 5.3 mmol/L Final   11/07/2017 3.9 3.4 - 5.3 mmol/L Final     Chloride   Date Value Ref Range Status   03/04/2024 104 98 - 107 mmol/L Final   04/18/2022 106 94 - 109 mmol/L Final   11/07/2017 108 94 - 109 mmol/L Final     Carbon Dioxide   Date Value Ref Range Status   11/07/2017 24 20 - 32 mmol/L Final     Carbon Dioxide (CO2)   Date Value Ref Range Status   03/04/2024 26 22 - 29 mmol/L Final   04/18/2022 26 20 - 32 mmol/L Final     Anion Gap   Date Value Ref Range Status   03/04/2024 11 7 - 15 mmol/L Final   04/18/2022 6 3 - 14 mmol/L Final   11/07/2017 8 3 - 14 mmol/L Final     Glucose   Date Value Ref Range  Status   03/04/2024 83 70 - 99 mg/dL Final   04/18/2022 86 70 - 99 mg/dL Final   11/07/2017 86 70 - 99 mg/dL Final     Urea Nitrogen   Date Value Ref Range Status   03/04/2024 16.4 6.0 - 20.0 mg/dL Final   04/18/2022 12 7 - 30 mg/dL Final   11/07/2017 18 7 - 30 mg/dL Final     Creatinine   Date Value Ref Range Status   03/04/2024 0.75 0.51 - 0.95 mg/dL Final   11/07/2017 0.65 0.52 - 1.04 mg/dL Final     GFR Estimate   Date Value Ref Range Status   03/04/2024 >90 >60 mL/min/1.73m2 Final   11/07/2017 >90 >60 mL/min/1.7m2 Final     Comment:     Non  GFR Calc     Calcium   Date Value Ref Range Status   03/04/2024 9.4 8.6 - 10.0 mg/dL Final   11/07/2017 9.0 8.5 - 10.1 mg/dL Final     Bilirubin Total   Date Value Ref Range Status   05/11/2023 0.4 <=1.2 mg/dL Final   11/07/2017 0.6 0.2 - 1.3 mg/dL Final     Alkaline Phosphatase   Date Value Ref Range Status   05/11/2023 66 35 - 104 U/L Final   11/07/2017 120 40 - 150 U/L Final     ALT   Date Value Ref Range Status   05/11/2023 20 10 - 35 U/L Final   11/07/2017 21 0 - 50 U/L Final     AST   Date Value Ref Range Status   05/11/2023 24 10 - 35 U/L Final   11/07/2017 14 0 - 45 U/L Final     Cholesterol   Date Value Ref Range Status   05/11/2023 224 (H) <200 mg/dL Final   03/24/2021 194 <200 mg/dL Final     HDL Cholesterol   Date Value Ref Range Status   03/24/2021 44 (L) >49 mg/dL Final     Direct Measure HDL   Date Value Ref Range Status   05/11/2023 40 (L) >=50 mg/dL Final     LDL Cholesterol Calculated   Date Value Ref Range Status   05/11/2023 147 (H) <=100 mg/dL Final   03/24/2021 136 (H) <100 mg/dL Final     Comment:     Above desirable:  100-129 mg/dl  Borderline High:  130-159 mg/dL  High:             160-189 mg/dL  Very high:       >189 mg/dl       Triglycerides   Date Value Ref Range Status   05/11/2023 183 (H) <150 mg/dL Final   03/24/2021 69 <150 mg/dL Final     WBC   Date Value Ref Range Status   10/07/2019 6.2 4.0 - 11.0 10e9/L Final     WBC Count  "  Date Value Ref Range Status   09/25/2023 5.8 4.0 - 11.0 10e3/uL Final     Hemoglobin   Date Value Ref Range Status   09/25/2023 13.1 11.7 - 15.7 g/dL Final   10/07/2019 12.5 11.7 - 15.7 g/dL Final     Hematocrit   Date Value Ref Range Status   09/25/2023 37.0 35.0 - 47.0 % Final   10/07/2019 37.2 35.0 - 47.0 % Final     MCV   Date Value Ref Range Status   09/25/2023 84 78 - 100 fL Final   10/07/2019 87 78 - 100 fl Final     Platelet Count   Date Value Ref Range Status   09/25/2023 279 150 - 450 10e3/uL Final   10/07/2019 311 150 - 450 10e9/L Final         BP Readings from Last 6 Encounters:   07/24/24 126/86   06/24/24 122/81   05/20/24 118/80   03/04/24 116/77   11/15/23 128/84   10/18/23 114/77       Pulse Readings from Last 6 Encounters:   07/24/24 88   06/24/24 88   05/20/24 80   03/04/24 68   11/15/23 81   10/18/23 92       PHYSICAL EXAM:  /86   Pulse 88   Ht 5' 8\" (1.727 m)   Wt 228 lb (103.4 kg)   LMP 06/10/2024 (Approximate)   SpO2 98%   BMI 34.67 kg/m    GENERAL: Healthy, alert and no distress  EYES: Eyes grossly normal to inspection.  No discharge or erythema, or obvious scleral/conjunctival abnormalities.  RESP: No audible wheeze, cough, or visible cyanosis.  No visible retractions or increased work of breathing.    SKIN: Visible skin clear. No significant rash, abnormal pigmentation or lesions.  NEURO: Cranial nerves grossly intact.  Mentation and speech appropriate for age.  PSYCH: Mentation appears normal, affect normal/bright, judgement and insight intact, normal speech and appearance well-groomed.    COUNSELING:   Reviewed obesity as a chronic disease and comprehensive management stratagies.      We discussed Bariatric Basics including:  -eating 3 meals daily  -eating protein first  -eating slowly, chewing food well  -avoiding/limiting calorie containing beverages  -limiting carbohydrates and changing to whole grains  -limiting restaurant or cafeteria eating to twice a week or " less    We discussed the importance of restorative sleep and stress management in maintaining a healthy weight.  We discussed insulin resistance and glycemic index as it relates to appetite and weight control.   We discussed the importance of physical activity including cardiovascular and strength training in maintaining a healthier weight and explored viable options.  Patient education of above written in AVS.      Sincerely,    CARMITA Medina PA-S

## 2024-07-24 NOTE — PATIENT INSTRUCTIONS
"Hi Bella!        It was great meeting with you today! Here are some links to the handouts I referenced:        Protein Sources:  [email or web address]     Fiber Sources:  [email or web address]     My Plate:  https://www.choosemyplate.gov/        A helpful search term to type into [employer], [employer], etc is \"myplate examples.\" [myplate examples - Google Search]     Key points from today:  Eat 3 meals/day at consistent intervals  Shoot for 70-90g protein (20-30g/meal)  Aim for 25-35g fiber (5-10g/meal)  Eat slowly: 20-30 minutes per meal     Here is a summary of the goals that we discussed:     1. At dinner:  - aim to fill half of your plate with the entree, and the other with vegetables/fruit     2. Have an afternoon snack  - protein + fiber (see examples in handouts)      3. Breakfast:  - Add a side of protein (ie eggs, yogurt) + some fruit         Let's plan on following up in 1-2 months. This can be scheduled via our call center at . Of course, reach out sooner if you have any questions or concerns. Have a great week and welcome to the program!        Ailyn Higgins RD, LD?  Clinical Dietitian   "

## 2024-07-24 NOTE — PROGRESS NOTES
MEDICAL WEIGHT LOSS INITIAL EVALUATION  Patient accompanied by: self  DIAGNOSIS:  Obesity Class I    NUTRITION HISTORY:  Diet and exercise history per pre-visit questionnaire as follows:    Based on your typical week, how often do you do the following?    Generally, my meals include foods like these: bread, pasta, rice, potatoes, corn, crackers, sweet dessert, pop, or juice. Almost Everyday   Generally, my meals include foods like these: fried meats, brats, burgers, french fries, pizza, cheese, chips, or ice cream. Almost Everyday   Eat fast food (like McDonalds, [employer], Taco Bell). Less Than Weekly   Eat at a buffet or sit-down restaurant. Less Than Weekly   Eat most of my meals in front of the TV or computer. A Few Times a Week   Often skip meals, eat at random times, have no regular eating times. A Few Times a Week   Rarely sit down for a meal but snack or graze throughout. Less Than Weekly   Eat extra snacks between meals. A Few Times a Week   Eat most of my food at the end of the day. Less Than Weekly   Eat in the middle of the night or wake up at night to eat. Never   Eat extra snacks to prevent or correct low blood sugar. Never   Eat to prevent acid reflux or stomach pain. Less Than Weekly   Worry about not having enough food to eat. Never   Have you been to the food shelf at least a few times this year? No   Please answer the following questions based on your eating patterns during a typical week.    I eat when I am depressed. A Few Times a Week   I eat when I am stressed. A Few Times a Week   I eat when I am bored. A Few Times a Week   I eat when I am anxious. A Few Times a Week   I eat when I am happy or as a reward. A Few Times a Week   I feel hungry all the time even if I just have eaten. Almost Everyday   Feeling full is important to me. Everyday   I finish all the food on my plate even if I am already full. A Few Times a Week   I can't resist eating delicious food or walk past the good food/smell.  Almost Everyday   I eat/snack without noticing that I am eating. Less Than Weekly   I eat when I am preparing the meal. Less Than Weekly   I eat more than usual when I see others eating. Once a Week   I have trouble not eating sweets, ice cream, cookies, or chips if they are around the house. Almost Everyday   I think about food all day. Almost Everyday   What foods, if any, do you crave? Sweets/Candy/Chocolate   Please list any other foods you crave? Fruit, bread, cereal   Please answer the following questions regarding the amount of food you have eaten over the past 6 months.    I feel out of control when eating. Weekly   I eat a large amount of food, like a loaf of bread, a box of cookies, a pint/quart of ice cream, all at once. Never   I eat a large amount of food even when I am not hungry. Never   I eat rapidly. Monthly   I eat alone because I feel embarrassed and do not want others to see how much I have eaten. Monthly   I eat until I am uncomfortably full. Monthly   I feel bad, disgusted, or guilty after I overeat. Weekly   I make myself vomit what I have eaten or use laxatives to get rid of food. Never   Please answer the following questions regarding if you usually eat a meal or not.  Please list all foods where appropriate. There are no wrong or right foods.      Do you typically eat breakfast? Yes   If you do eat breakfast, what types of food do you eat? Cereal and milk, eggs, cinnamon raisin english muffins, brueggers bagel, coffee with cream, donuts   Do you typically eat lunch? Yes   If you do eat lunch, what types of food do you typically eat? A sandwich, rotisserie chicken, mac n chs or pasta rigoberto if my kids have it, or eat a late breakfast at 11 and just a snack around 2, trky stix and string chs, lean cuisine at work   Do you typically eat supper? Yes   If you do eat supper, what types of food do you typically eat? Chicken and caesar salad, chicken burgers and veggies, pizza, taco salads, grilled  "sandwiches   Do you typically eat snacks? Yes   If you do snack, what types of food do you typically eat? String chs, yogurt with fruit and nuts, baked goods, sweets, hardboiled eggs, lunch meat, protein shakes, Applesauce, fruit   Do you like vegetables? Yes   Do you drink water? Yes   How many glasses of juice do you drink in a typical day? 0   How many of glasses of milk do you drink in a typical day? 1   If you do drink milk, what type? 1%   How many 8oz glasses of sugar containing drinks such as Juan-Aid/sweet tea do you drink in a day? 0   How many cans/bottles of sugar pop/soda/tea/sports drinks do you drink in a day? 0   How many cans/bottles of diet pop/soda/tea or sports drink do you drink in a day? 1   How often do you have a drink of alcohol? Monthly or Less   If you do drink, how many drinks might you have in a day? 1 or 2   Please answer these questions based on a typical 12 hour day including time at work and home.    How much of a typical 12 hour day do you spend sitting? Half the Day   How much of a typical 12 hour day do you spend lying down? Less Than Half the Day   How much of a typical day do you spend walking/standing? Half the Day   How many hours (not including work) do you spend on the TV/Video Games/Computer/Tablet/Phone? 2-3 Hours   How many times a week are you active for the purpose of exercise? 2-3 Times a Week   What keeps you from being more active? Too tired   How many total minutes do you spend doing some activity for the purpose of exercising when you exercise? 15-30 Minutes       ADDITIONAL INFORMATION: Pt feels she struggles with feeling full/satisfied with her meals; describes self as a food addict. Craves sugar and texture. Appropriate questions about balancing meal needs amidst family dinners. 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 Pt may opt into 24w program.       ANTHROPOMETRICS:  Height: 5' 8\"   Weight: 228 lbs 0 oz   BMI:  34.67 kg/m2  NUTRITION DIAGNOSIS: "   Obese class I related to overeating and poor lifestyle habits as evidence by patient's subjective statements and  BMI of 34.67 kg/m2   NUTRITION INTERVENTIONS  Nutrition Prescription:  Recommend modified energy- nutrient intake  Implementation:  Nutrition Education (Content):  Discussed portion sizes and plate method  Provided: Tips for Weight Loss and Weight Management, Plate Guidelines, Protein Sources for Weight Loss, Fiber Content in Food    Nutrition Education (Application):   Patient to practice goals as stated below  Patient verbalizes understanding of diet by stating she will increase protein and fiber at breakfast.  Anticipate good compliance    Goals:  Add protein and fruit to breakfast  Aim for 1/2 plate entree + 1/2 plate vegetables/fruit at dinner  Have an afternoon snack      FOLLOW UP AND MONITORING:    Other  - follow up in 4-8 weeks.     TIME SPENT WITH PATIENT:   40 minutes     Ailyn Higgins RD, LD  Clinical Dietitian

## 2024-07-26 ENCOUNTER — TELEPHONE (OUTPATIENT)
Dept: ENDOCRINOLOGY | Facility: CLINIC | Age: 39
End: 2024-07-26
Payer: COMMERCIAL

## 2024-07-26 DIAGNOSIS — E03.4 HYPOTHYROIDISM DUE TO ACQUIRED ATROPHY OF THYROID: ICD-10-CM

## 2024-07-27 DIAGNOSIS — E66.01 CLASS 2 SEVERE OBESITY DUE TO EXCESS CALORIES WITH SERIOUS COMORBIDITY AND BODY MASS INDEX (BMI) OF 35.0 TO 35.9 IN ADULT (H): ICD-10-CM

## 2024-07-27 DIAGNOSIS — E66.812 CLASS 2 SEVERE OBESITY DUE TO EXCESS CALORIES WITH SERIOUS COMORBIDITY AND BODY MASS INDEX (BMI) OF 35.0 TO 35.9 IN ADULT (H): ICD-10-CM

## 2024-07-29 ENCOUNTER — MYC REFILL (OUTPATIENT)
Dept: ENDOCRINOLOGY | Facility: CLINIC | Age: 39
End: 2024-07-29
Payer: COMMERCIAL

## 2024-07-29 DIAGNOSIS — E66.812 CLASS 2 SEVERE OBESITY DUE TO EXCESS CALORIES WITH SERIOUS COMORBIDITY AND BODY MASS INDEX (BMI) OF 35.0 TO 35.9 IN ADULT (H): ICD-10-CM

## 2024-07-29 DIAGNOSIS — E66.01 CLASS 2 SEVERE OBESITY DUE TO EXCESS CALORIES WITH SERIOUS COMORBIDITY AND BODY MASS INDEX (BMI) OF 35.0 TO 35.9 IN ADULT (H): ICD-10-CM

## 2024-07-29 RX ORDER — VENLAFAXINE HYDROCHLORIDE 37.5 MG/1
37.5 CAPSULE, EXTENDED RELEASE ORAL DAILY
Qty: 30 CAPSULE | Refills: 1 | Status: SHIPPED | OUTPATIENT
Start: 2024-07-29 | End: 2024-09-17

## 2024-07-29 RX ORDER — PHENTERMINE HYDROCHLORIDE 37.5 MG/1
37.5 TABLET ORAL
Qty: 30 TABLET | Refills: 3 | Status: SHIPPED | OUTPATIENT
Start: 2024-07-29 | End: 2024-08-23

## 2024-07-29 NOTE — TELEPHONE ENCOUNTER
Requested Prescriptions   Pending Prescriptions Disp Refills    phentermine (ADIPEX-P) 37.5 MG tablet 30 tablet 3     Sig: Take 1 tablet (37.5 mg) by mouth every morning (before breakfast)       There is no refill protocol information for this order

## 2024-07-30 ENCOUNTER — TELEPHONE (OUTPATIENT)
Dept: GASTROENTEROLOGY | Facility: CLINIC | Age: 39
End: 2024-07-30
Payer: COMMERCIAL

## 2024-07-30 RX ORDER — LEVOTHYROXINE SODIUM 100 UG/1
100 TABLET ORAL DAILY
Qty: 30 TABLET | Refills: 2 | OUTPATIENT
Start: 2024-07-30

## 2024-07-30 RX ORDER — PHENTERMINE HYDROCHLORIDE 37.5 MG/1
37.5 TABLET ORAL
Qty: 30 TABLET | Refills: 3 | Status: SHIPPED | OUTPATIENT
Start: 2024-07-30

## 2024-07-30 NOTE — TELEPHONE ENCOUNTER
Pt needs labs now    6/24/24:  We reviewed her recent laboratory results and I would like to increase the liothyronine to 10 mcg daily. I will send in a new prescription today and we will follow-up with repeat testing in 4-6 wks.     We also recently decreased the levothyroxine from 112 mcg down to 100 mcg. She has not had any problems since changing the dose.

## 2024-08-06 DIAGNOSIS — E03.4 HYPOTHYROIDISM DUE TO ACQUIRED ATROPHY OF THYROID: ICD-10-CM

## 2024-08-06 RX ORDER — LIOTHYRONINE SODIUM 5 UG/1
10 TABLET ORAL DAILY
Qty: 180 TABLET | Refills: 1 | Status: SHIPPED | OUTPATIENT
Start: 2024-08-06

## 2024-08-08 ENCOUNTER — LAB (OUTPATIENT)
Dept: LAB | Facility: CLINIC | Age: 39
End: 2024-08-08
Payer: COMMERCIAL

## 2024-08-08 DIAGNOSIS — E03.4 HYPOTHYROIDISM DUE TO ACQUIRED ATROPHY OF THYROID: ICD-10-CM

## 2024-08-08 LAB
ANION GAP SERPL CALCULATED.3IONS-SCNC: 12 MMOL/L (ref 7–15)
BUN SERPL-MCNC: 10.1 MG/DL (ref 6–20)
CALCIUM SERPL-MCNC: 9.4 MG/DL (ref 8.8–10.4)
CHLORIDE SERPL-SCNC: 107 MMOL/L (ref 98–107)
CREAT SERPL-MCNC: 0.76 MG/DL (ref 0.51–0.95)
EGFRCR SERPLBLD CKD-EPI 2021: >90 ML/MIN/1.73M2
GLUCOSE SERPL-MCNC: 89 MG/DL (ref 70–99)
HCO3 SERPL-SCNC: 23 MMOL/L (ref 22–29)
POTASSIUM SERPL-SCNC: 4.2 MMOL/L (ref 3.4–5.3)
SODIUM SERPL-SCNC: 142 MMOL/L (ref 135–145)
T3 SERPL-MCNC: 117 NG/DL (ref 85–202)
T4 FREE SERPL-MCNC: 1.14 NG/DL (ref 0.9–1.7)
TSH SERPL DL<=0.005 MIU/L-ACNC: 0.31 UIU/ML (ref 0.3–4.2)

## 2024-08-08 PROCEDURE — 84480 ASSAY TRIIODOTHYRONINE (T3): CPT

## 2024-08-08 PROCEDURE — 80048 BASIC METABOLIC PNL TOTAL CA: CPT

## 2024-08-08 PROCEDURE — 84439 ASSAY OF FREE THYROXINE: CPT

## 2024-08-08 PROCEDURE — 84443 ASSAY THYROID STIM HORMONE: CPT

## 2024-08-08 PROCEDURE — 36415 COLL VENOUS BLD VENIPUNCTURE: CPT

## 2024-08-09 ENCOUNTER — TRANSFERRED RECORDS (OUTPATIENT)
Dept: HEALTH INFORMATION MANAGEMENT | Facility: CLINIC | Age: 39
End: 2024-08-09

## 2024-08-09 ENCOUNTER — ALLIED HEALTH/NURSE VISIT (OUTPATIENT)
Dept: ENDOCRINOLOGY | Facility: CLINIC | Age: 39
End: 2024-08-09
Payer: COMMERCIAL

## 2024-08-09 DIAGNOSIS — E66.09 CLASS 1 OBESITY DUE TO EXCESS CALORIES WITH SERIOUS COMORBIDITY AND BODY MASS INDEX (BMI) OF 34.0 TO 34.9 IN ADULT: ICD-10-CM

## 2024-08-09 DIAGNOSIS — E66.811 CLASS 1 OBESITY DUE TO EXCESS CALORIES WITH SERIOUS COMORBIDITY AND BODY MASS INDEX (BMI) OF 34.0 TO 34.9 IN ADULT: ICD-10-CM

## 2024-08-09 PROCEDURE — 99207 PR NO CHARGE LOS: CPT | Performed by: NURSE PRACTITIONER

## 2024-08-09 PROCEDURE — 91200 LIVER ELASTOGRAPHY: CPT | Mod: 26 | Performed by: NURSE PRACTITIONER

## 2024-08-09 NOTE — Clinical Note
Fibroscan ready for read Same Histology In Subsequent Stages Text: The pattern and morphology of the tumor is as described in the first stage.

## 2024-08-09 NOTE — PROGRESS NOTES
EXAMINATION:    Liver FibroScan    DATE OF EXAM:  8/9/24    INDICATION:    Liver fibrosis assessment      A series of at least 10 Vibration Controlled Transient Elastography (VCTETM) measurements was performed by  placing the probe M (M, XL) over the center of the liver parenchyma and mechanically inducing a 50 Hertz  shear wave.    Each resulting VCTETM measurement was analyzed to determine shear wave propagation speed and  calculate the equivalent liver stiffness.    All measurements were reviewed by the  and physician fortechnical accuracy. Data variability across the acquired measurements was quantified with IQR/Median Percentage.    FINDINGS:    The median liver stiffness was 4.6 kPa with IQR/Median percentage of 20 %.    The measure CAP ultrasound attenuation rate value was 258 dB/m.

## 2024-08-12 RX ORDER — LEVOTHYROXINE SODIUM 100 UG/1
100 TABLET ORAL DAILY
Qty: 90 TABLET | Refills: 0 | Status: SHIPPED | OUTPATIENT
Start: 2024-08-12

## 2024-08-15 DIAGNOSIS — E66.09 CLASS 1 OBESITY DUE TO EXCESS CALORIES WITH SERIOUS COMORBIDITY AND BODY MASS INDEX (BMI) OF 34.0 TO 34.9 IN ADULT: ICD-10-CM

## 2024-08-15 DIAGNOSIS — K76.0 FATTY LIVER: Primary | ICD-10-CM

## 2024-08-15 DIAGNOSIS — E66.811 CLASS 1 OBESITY DUE TO EXCESS CALORIES WITH SERIOUS COMORBIDITY AND BODY MASS INDEX (BMI) OF 34.0 TO 34.9 IN ADULT: ICD-10-CM

## 2024-08-21 DIAGNOSIS — K21.9 GASTROESOPHAGEAL REFLUX DISEASE, UNSPECIFIED WHETHER ESOPHAGITIS PRESENT: ICD-10-CM

## 2024-08-21 DIAGNOSIS — E66.09 CLASS 1 OBESITY DUE TO EXCESS CALORIES WITH SERIOUS COMORBIDITY AND BODY MASS INDEX (BMI) OF 34.0 TO 34.9 IN ADULT: ICD-10-CM

## 2024-08-21 DIAGNOSIS — E66.811 CLASS 1 OBESITY DUE TO EXCESS CALORIES WITH SERIOUS COMORBIDITY AND BODY MASS INDEX (BMI) OF 34.0 TO 34.9 IN ADULT: ICD-10-CM

## 2024-08-23 ENCOUNTER — TELEPHONE (OUTPATIENT)
Dept: ENDOCRINOLOGY | Facility: CLINIC | Age: 39
End: 2024-08-23
Payer: COMMERCIAL

## 2024-08-23 ENCOUNTER — VIRTUAL VISIT (OUTPATIENT)
Dept: CARDIOLOGY | Facility: CLINIC | Age: 39
End: 2024-08-23
Attending: PHYSICIAN ASSISTANT
Payer: COMMERCIAL

## 2024-08-23 VITALS — HEIGHT: 68 IN | BODY MASS INDEX: 34.4 KG/M2 | WEIGHT: 227 LBS

## 2024-08-23 DIAGNOSIS — F32.4 MAJOR DEPRESSIVE DISORDER IN PARTIAL REMISSION, UNSPECIFIED WHETHER RECURRENT (H): ICD-10-CM

## 2024-08-23 DIAGNOSIS — K21.9 GASTROESOPHAGEAL REFLUX DISEASE, UNSPECIFIED WHETHER ESOPHAGITIS PRESENT: ICD-10-CM

## 2024-08-23 DIAGNOSIS — E66.811 CLASS 1 OBESITY DUE TO EXCESS CALORIES WITH SERIOUS COMORBIDITY AND BODY MASS INDEX (BMI) OF 34.0 TO 34.9 IN ADULT: Primary | ICD-10-CM

## 2024-08-23 DIAGNOSIS — E66.09 CLASS 1 OBESITY DUE TO EXCESS CALORIES WITH SERIOUS COMORBIDITY AND BODY MASS INDEX (BMI) OF 34.0 TO 34.9 IN ADULT: Primary | ICD-10-CM

## 2024-08-23 DIAGNOSIS — E03.8 OTHER SPECIFIED HYPOTHYROIDISM: ICD-10-CM

## 2024-08-23 ASSESSMENT — PAIN SCALES - GENERAL: PAINLEVEL: NO PAIN (0)

## 2024-08-23 NOTE — LETTER
8/23/2024      RE: Bella Scott  [address]  [place] [address]       Dear Colleague,    Thank you for the opportunity to participate in the care of your patient, Bella Scott, at the [facility]. Please see a copy of my visit note below.    Medication Therapy Management (MTM) Encounter    ASSESSMENT:                            Medication Adherence/Access: No issues identified    Weight management:   Patient would benefit from additional pharmacotherapy for weight management. Given class III obesity, recommend GLP1/GIP therapy as data to support most significant weight loss. Patient also likely to benefit from reduction in food noise and increased satiety. Negative GI symptomatology at baseline. Negative history of pancreatitis, medullary thyroid cancer and multiple endocrine neoplasia type 2. Reviewed mechanism, adverse effects, monitoring, safety, administration with use of Zepbound.      For patients that are under [employer] insurance coverage, it is mandated by insurance that each qualifying patient meet with hospital based Weight Management Medication Therapy Management pharmacist to continue therapy coverage. The following patient meets the below coverage criteria and can therefore continue GLP-1/GIP agonist therapy for Weight Management:    Adult  BMI >40 with or without comorbidities   OR   BMI >30 + NAFLD*   at time of initiating GLP-1/GIP agonist therapy    Fibrosis scan 8/11/2024 Approved for 29 weeks  Met Updated Initial Criteria   At least 5% weight loss of baseline body weight  Approved for 12 months      Thyroid:   Stable with current regimen.     GERD    Stable with current regimen.      Mental Health   Depression and chronic fatigue  Stable with current regimen and tapering of venlafaxine.       PLAN:                            Pharmacist to start Prior Authorization on Zepbound.  "Once approved, to start Zepbound 2.5 mg subcutaneous once weekly for 4 weeks, then if tolerating increase to 5 mg weekly thereafter.  RX will be sent to [facility] for both 2.5 mg and 5 mg doses. The 2.5 mg dose will be filled. The 5 mg dose will be put on hold. Call the pharmacy when ready to fill the 5 mg box.     To help with tolerability and effectiveness of Zepbound:  Eat small meals/snacks throughout the day (about every 2 hours)  Focus on getting protein in first with each meal and snack.   A good starting goal is 60 g protein daily (track this, especially if at weight loss plateau). Once you consistently are getting 60g daily, try getting 90 g protein daily.  Drink plenty of water - goal 64 oz throughout the day  You may try Metamucil, Benefiber, or Citrucel to help feel more full (less nausea) and have softer, more consistent bowel movements.  To optimize weight management - work on incorporating resistance training/weight lifting to build muscle and improve overall metabolism of adipose tissue.      [facility] employees with [employer] insurance ([facility]) are restricted to using the [facility] for Saxenda, Wegovy, and Zepbound    [facility]  [place], [place] - [address]  Phone: [phone]    Next steps:  After processing the prescription and saving to your profile, the pharmacy will give you an automated call to inform you that they have your prescription on file. This typically occurs within 1-2 days after the prescription is sent but may take 3-5 business days during high volume times.  You will need to call the pharmacy back to set up the first delivery of every new prescription or new medication dose.   Once you are on a stable dose, you can inquire with the pharmacy about signing up for \"Text to Order through [employer]\", \"Auto Fill\", and/or using the \"My [employer] [facility]\" luis.     Follow-up: October " 11th at 11:30am with Lyubov      SUBJECTIVE/OBJECTIVE:                          Bella Scott is a 38 year old female called for an initial visit. She was referred to me from Prasad Mckinley.      Reason for visit: [facility] Insurance requirements.    Allergies/ADRs: Reviewed in chart  Past Medical History: Reviewed in chart  Tobacco: She reports that she has never smoked. She has never been exposed to tobacco smoke. She has never used smokeless tobacco.  Alcohol: Less than 1 beverage / month      Medication Adherence/Access: no issues reported    Medical History:  MEN2/Medullary Thyroid Cancer: no  Pancreatitis/gall bladder: no    Baseline GI symptomatology: none     Pregnancy: no concern    Weight Management  Phentermine 37.5 mg once daily - capsules in the past (15 mg), felt they were more effective - 3x using this medication; heart palpitations, fluttering, increased sweating; tablets seem to be better - limited side effects, feeling more energy (hx of chronic fatigue), has some decreased appetite - insatiable in the past, feeling more full in the past     Topiramate 25 mg once daily - just started 1 month ago, started with 1/2 tablet, s/e of headaches in the beginning. Slowly increased to current dose. Cravings haven't been decreased as much. Taking at night. Prefers to stay on for now    Nutrition/Eating Habits: struggles with cravings, food noise - thinks about food all day long, thinks about next meal -  working with RD, working on focusing on protein and fiber  Breakfast: cereal, coffee  Lunch: sandwhich   Dinner: meat & vegetables   Snacks: fruit cups, applesauce   Water: Carries water everywhere, 80 oz  Exercise/Activity: walks outside, elliptical at home, home workouts ([employer])     Medications Tried/Failed:  See above:       Initial Consult Weight: 227 lbs (8/23/2024)       Wt Readings from Last 4 Encounters:   08/23/24 103 kg (227 lb)   07/24/24 103.4 kg (228 lb)   07/24/24 103.4 kg (228 lb)  "  06/24/24 102.6 kg (226 lb 4.8 oz)     Estimated body mass index is 34.52 kg/m  as calculated from the following:    Height as of this encounter: 1.727 m (5' 8\").    Weight as of this encounter: 103 kg (227 lb).        Thyroid:   Levothyroxine 100 mcg once daily   Liothyronine 5 mcg, rx'd as 2 tablets once daily (currently taking 1 tablet daily, nervous about increased anxiety)    GERD   Omeprazole 20 mg once daily   Patient feels that current regimen is effective.       Mental Health  Depression and chronic fatigue  Venlafaxine 37.5 mg once daily - feels more stable mentally, working on tapering off.   Patient reports no current medication side effects.        ----------------    I spent 59 minutes with this patient today. All changes were made via collaborative practice agreement with Osmar Rendon PA-C and Joycelyn Wayne PA-C as one of the credentialed prescriber of the [facility] MTM Weight Mgmt Program.   A copy of the visit note was provided to the patient's provider(s).    A summary of these recommendations was sent via [employer].    Lyubov Medrano, PharmD, BCACP  Medication Therapy Management (MTM) Pharmacist   [facility]    Telemedicine Visit Details  Type of service:  Telephone visit  Start Time: 1:01 PM  End Time: 2:00 PM     Medication Therapy Recommendations  Class 1 obesity due to excess calories with serious comorbidity and body mass index (BMI) of 34.0 to 34.9 in adult    Current Medication: tirzepatide-Weight Management (ZEPBOUND) 2.5 MG/0.5ML prefilled pen   Rationale: Untreated condition - Needs additional medication therapy - Indication   Recommendation: Start Medication - Zepbound 2.5 MG/0.5ML Soaj   Status: Accepted per CPA                Please do not hesitate to contact me if you have any questions/concerns.     Sincerely,     Lyubov Medrano [facility]  "

## 2024-08-23 NOTE — Clinical Note
Joycelyn Ortiz. Per [facility] insurance requirements - used our CPA today, started Zepbound.   Denise

## 2024-08-23 NOTE — TELEPHONE ENCOUNTER
Prior Authorization Retail Medication Request    Medication/Dose: [facility] insurance requirements Zepbound 2.5mg - 15 mg  (all doses) as indicated by supply, safety, and efficacy.    Diagnosis and ICD code (if different than what is on RX):    New/renewal/insurance change PA/secondary ins. PA:    Rationale: Bella Scott is a 38 year old female with a diagnosis of Obesity (BMI at least 30 kg/m2) and hepatic steatosis. Patient met with [facility] Medication Therapy Management Pharmacist 08/23/2024 per [facility] insurance requirements. Patient denies personal or family history of MEN Type2, MTC, Pancreatitis.     Patient would benefit from additional pharmacotherapy for weight management. Given class III obesity, recommend GLP1/GIP therapy as data to support most significant weight loss. Patient also likely to benefit from reduction in food noise and increased satiety. Negative GI symptomatology at baseline. Negative history of pancreatitis, medullary thyroid cancer and multiple endocrine neoplasia type 2. Reviewed mechanism, adverse effects, monitoring, safety, administration with use of Zepbound.      For patients that are under [employer] Employee/[employer] insurance coverage, it is mandated by insurance that each qualifying patient meet with hospital based Weight Management Medication Therapy Management pharmacist to continue therapy coverage. The following patient meets the below coverage criteria and can therefore continue GLP-1/GIP agonist therapy for Weight Management:    Adult  BMI >40 with or without comorbidities   OR   BMI >30 + NAFLD*   at time of initiating GLP-1/GIP agonist therapy    Fibrosis scan 8/11/2024 Approved for 29 weeks  Met Updated Initial Criteria   At least 5% weight loss of baseline body weight  Approved for 12 months        Estimated body mass index is 34.52 kg/m  as calculated from the following:    Height as of an earlier encounter on 8/23/24: 1.727 m (5'  "8\").    Weight as of an earlier encounter on 8/23/24: 103 kg (227 lb).     Insurance   Primary:   Insurance ID:      Secondary (if applicable):  Insurance ID:      Pharmacy Information (if different than what is on RX)  [facility]    Phone: [phone]  - Press 2 to leave a refill request on a secured voicemail  - Press 3 to place an automated refill request  - Press 4 to speak directly to a member of the pharmacy staff    Hours: Monday through Friday 8am to 7pm and Saturday 8am to 4pm      "

## 2024-08-23 NOTE — Clinical Note
Jean Carlos Parham,   Thanks for the referral! Bella is excited to start on Zepbound. I plan to follow up with her in October and she is aware of the other [facility] appointments coming up as well.   Thanks! Lyubov Medrano, PharmD, BCACP Medication Therapy Management (MTM) Pharmacist  [facility]

## 2024-08-23 NOTE — PATIENT INSTRUCTIONS
Recommendations from today's MT visit:                                                    Pharmacist to start Prior Authorization on Zepbound. Once approved, to start Zepbound 2.5 mg subcutaneous once weekly for 4 weeks, then if tolerating increase to 5 mg weekly thereafter.  RX will be sent to [facility] for both 2.5 mg and 5 mg doses. The 2.5 mg dose will be filled. The 5 mg dose will be put on hold. Call the pharmacy when ready to fill the 5 mg box.     To help with tolerability and effectiveness of Zepbound:  Eat small meals/snacks throughout the day (about every 2 hours)  Focus on getting protein in first with each meal and snack.   A good starting goal is 60 g protein daily (track this, especially if at weight loss plateau). Once you consistently are getting 60g daily, try getting 90 g protein daily.  Drink plenty of water - goal 64 oz throughout the day  You may try Metamucil, Benefiber, or Citrucel to help feel more full (less nausea) and have softer, more consistent bowel movements.  To optimize weight management - work on incorporating resistance training/weight lifting to build muscle and improve overall metabolism of adipose tissue.      [employer] employees with [employer] insurance ([facility]) are restricted to using the [facility] for Saxenda, Wegovy, and Zepbound    [facility]  [place], [place] - [address]  Phone: [phone]    Next steps:  After processing the prescription and saving to your profile, the pharmacy will give you an automated call to inform you that they have your prescription on file. This typically occurs within 1-2 days after the prescription is sent but may take 3-5 business days during high volume times.  You will need to call the pharmacy back to set up the first delivery of every new prescription or new medication dose.   Once you are on a stable dose, you can inquire with  "the pharmacy about signing up for \"Text to Order through MScripts\", \"Auto Fill\", and/or using the \"[employer]\" luis.     Follow-up: October 11th at 11:30am with Lyubov    It was great speaking with you today.  I value your experience and would be very thankful for your time in providing feedback in our clinic survey. In the next few days, you may receive an email or text message from [employer] with a link to a survey related to your  clinical pharmacist.\"     To schedule another MTM appointment, please call the clinic directly or you may call the MTM scheduling line at [phone] or toll-free at [phone].     My Clinical Pharmacist's contact information:                                                      Please feel free to contact me with any questions or concerns you have.      Lyubov Medrano, PharmD, [facility]  Medication Therapy Management (MTM) Pharmacist   [facility]       ---  Zepbound Dosing:   Start Zepbound: It is a subcutaneous injection that you inject once weekly and titrate the dose slowly over time. Make sure inject the same time each day of the week, for example Monday evenings.  Each pen is single use.  In each prescription, you will get 4 pens in each box.  You will need a new prescription for each strength of the medication.  Week 1-4: Inject 2.5 mg once weekly  Week 5-8: If tolerating, can increase to 5 mg once weekly if necessary  Week 9-12: If tolerating, can increase to 7.5 mg once weekly if necessary  Week 13-16: If tolerating, can increase to 10 mg once weekly if necessary  Week 17-20: If tolerating, can increase to 12.5 mg once weekly if necessary  Week 21 and on: if tolerating, can increase to 15 mg once weekly if necessary    The goal is to get to a dose that is well tolerated and effective for you. You do not have to go up on the dose each month or get to maximum dose if getting an effective response with minimal side effects. "     *If you are having some nausea or other side effects to where you are hesitant to move up to the next dose, stay at the same dose you are on for an additional week to see if side effect(s) improves/resolves. Make sure to take this time to hydrate and ensure you are drinking at least 64 oz water per day. If you are wanting to stay at the same dose and do not have additional refills on that prescription please reach out to the clinic.    Zepbound Administration Video: Video that was created by the .  https://www.zepboundRives and Company/how-to-use    Zepbound Copay Card:  https://www.zepboundKadmon.com/coverage-savings    Zepbound Storage and Stability:   Make sure that when you get the prescription that you store the prescription in the refrigerator until it is time to use the Zepbound pen.  Once it is time to use the Zepbound pen, you can keep the pen at room temperature and it is good for up to 21 days at room temperature.     Zepbound Common Side Effects:   Nausea, diarrhea, constipation, headache, tiredness (fatigue), dizziness, stomach upset/pain. Less commonly, Zepbound can cause low blood sugar (symptoms: shaky, dizzy, sweaty, agitation). Please reach out to the care team should you feel like this is occurring. It is important to ensure that you are eating consistent meals and not skipping meals. Ensure you are getting at least 64 oz water daily.

## 2024-08-23 NOTE — PROGRESS NOTES
Medication Therapy Management (MTM) Encounter    ASSESSMENT:                            Medication Adherence/Access: No issues identified    Weight management:   Patient would benefit from additional pharmacotherapy for weight management. Given class III obesity, recommend GLP1/GIP therapy as data to support most significant weight loss. Patient also likely to benefit from reduction in food noise and increased satiety. Negative GI symptomatology at baseline. Negative history of pancreatitis, medullary thyroid cancer and multiple endocrine neoplasia type 2. Reviewed mechanism, adverse effects, monitoring, safety, administration with use of Zepbound.      For patients that are under [employer] insurance coverage, it is mandated by insurance that each qualifying patient meet with hospital based Weight Management Medication Therapy Management pharmacist to continue therapy coverage. The following patient meets the below coverage criteria and can therefore continue GLP-1/GIP agonist therapy for Weight Management:    Adult  BMI >40 with or without comorbidities   OR   BMI >30 + NAFLD*   at time of initiating GLP-1/GIP agonist therapy    Fibrosis scan 8/11/2024 Approved for 29 weeks  Met Updated Initial Criteria   At least 5% weight loss of baseline body weight  Approved for 12 months      Thyroid:   Stable with current regimen.     GERD    Stable with current regimen.      Mental Health   Depression and chronic fatigue  Stable with current regimen and tapering of venlafaxine.       PLAN:                            Pharmacist to start Prior Authorization on Zepbound. Once approved, to start Zepbound 2.5 mg subcutaneous once weekly for 4 weeks, then if tolerating increase to 5 mg weekly thereafter.  RX will be sent to [facility] for both 2.5 mg and 5 mg doses. The 2.5 mg dose will be filled. The 5 mg dose will be put on hold. Call the pharmacy when ready to fill the 5 mg box.  "    To help with tolerability and effectiveness of Zepbound:  Eat small meals/snacks throughout the day (about every 2 hours)  Focus on getting protein in first with each meal and snack.   A good starting goal is 60 g protein daily (track this, especially if at weight loss plateau). Once you consistently are getting 60g daily, try getting 90 g protein daily.  Drink plenty of water - goal 64 oz throughout the day  You may try Metamucil, Benefiber, or Citrucel to help feel more full (less nausea) and have softer, more consistent bowel movements.  To optimize weight management - work on incorporating resistance training/weight lifting to build muscle and improve overall metabolism of adipose tissue.      [employer] employees with [employer] insurance ([facility]) are restricted to using the [employer] [facility] for Saxenda, Wegovy, and Zepbound    [facility]  [facility] [address]  Phone: [phone]    Next steps:  After processing the prescription and saving to your profile, the pharmacy will give you an automated call to inform you that they have your prescription on file. This typically occurs within 1-2 days after the prescription is sent but may take 3-5 business days during high volume times.  You will need to call the pharmacy back to set up the first delivery of every new prescription or new medication dose.   Once you are on a stable dose, you can inquire with the pharmacy about signing up for \"Text to Order through Tutorripts\", \"Auto Fill\", and/or using the \"My [employer] Rx\" luis.     Follow-up: October 11th at 11:30am with Lyubov      SUBJECTIVE/OBJECTIVE:                          Bella Scott is a 38 year old female called for an initial visit. She was referred to me from Prasad Mckinley.      Reason for visit: [facility] Insurance requirements.    Allergies/ADRs: Reviewed in chart  Past Medical History: Reviewed in chart  Tobacco: She " "reports that she has never smoked. She has never been exposed to tobacco smoke. She has never used smokeless tobacco.  Alcohol: Less than 1 beverage / month      Medication Adherence/Access: no issues reported    Medical History:  MEN2/Medullary Thyroid Cancer: no  Pancreatitis/gall bladder: no    Baseline GI symptomatology: none     Pregnancy: no concern    Weight Management   Phentermine 37.5 mg once daily - capsules in the past (15 mg), felt they were more effective - 3x using this medication; heart palpitations, fluttering, increased sweating; tablets seem to be better - limited side effects, feeling more energy (hx of chronic fatigue), has some decreased appetite - insatiable in the past, feeling more full in the past     Topiramate 25 mg once daily - just started 1 month ago, started with 1/2 tablet, s/e of headaches in the beginning. Slowly increased to current dose. Cravings haven't been decreased as much. Taking at night. Prefers to stay on for now    Nutrition/Eating Habits: struggles with cravings, food noise - thinks about food all day long, thinks about next meal -  working with RD, working on focusing on protein and fiber  Breakfast: cereal, coffee  Lunch: sandwhich   Dinner: meat & vegetables   Snacks: fruit cups, applesauce   Water: Carries water everywhere, 80 oz  Exercise/Activity: walks outside, elliptical at home, home workouts ([employer])     Medications Tried/Failed:  See above:       Initial Consult Weight: 227 lbs (8/23/2024)       Wt Readings from Last 4 Encounters:   08/23/24 103 kg (227 lb)   07/24/24 103.4 kg (228 lb)   07/24/24 103.4 kg (228 lb)   06/24/24 102.6 kg (226 lb 4.8 oz)     Estimated body mass index is 34.52 kg/m  as calculated from the following:    Height as of this encounter: 1.727 m (5' 8\").    Weight as of this encounter: 103 kg (227 lb).        Thyroid:   Levothyroxine 100 mcg once daily   Liothyronine 5 mcg, rx'd as 2 tablets once daily (currently taking 1 tablet daily, " nervous about increased anxiety)    GERD    Omeprazole 20 mg once daily   Patient feels that current regimen is effective.       Mental Health   Depression and chronic fatigue  Venlafaxine 37.5 mg once daily - feels more stable mentally, working on tapering off.   Patient reports no current medication side effects.        ----------------    I spent 59 minutes with this patient today. All changes were made via collaborative practice agreement with Osmar Rendon PA-C and Joycelyn Wayne PA-C as one of the credentialed prescriber of the [employer] [facility] MTM Weight Mgmt Program.   A copy of the visit note was provided to the patient's provider(s).    A summary of these recommendations was sent via wali.    Lyubov Medrano, PharmD, [facility]CP  Medication Therapy Management (MTM) Pharmacist   [facility]    Telemedicine Visit Details  Type of service:  Telephone visit  Start Time: 1:01 PM  End Time: 2:00 PM     Medication Therapy Recommendations  Class 1 obesity due to excess calories with serious comorbidity and body mass index (BMI) of 34.0 to 34.9 in adult    Current Medication: tirzepatide-Weight Management (ZEPBOUND) 2.5 MG/0.5ML prefilled pen   Rationale: Untreated condition - Needs additional medication therapy - Indication   Recommendation: Start Medication - Zepbound 2.5 MG/0.5ML Soaj   Status: Accepted per CPA

## 2024-08-23 NOTE — NURSING NOTE
Current patient location: [address] [facility] [address]    Is the patient currently in the state of [place]? YES    Visit mode:TELEPHONE    If the visit is dropped, the patient can be reconnected by: TELEPHONE VISIT: Phone number:   Telephone Information:   Mobile [phone]       Will anyone else be joining the visit? NO  (If patient encounters technical issues they should call [phone])    How would you like to obtain your AVS? MyChart    Are changes needed to the allergy or medication list? Yes Pt states please remove duplicate Phentermine and please update Liothyronine to 1 tablet daily (5 mcg).    Are refills needed on medications prescribed by this physician? NO    Rooming Documentation:  Not applicable      Reason for visit: Consult    Moreno VIDES

## 2024-08-26 DIAGNOSIS — F33.0 MILD EPISODE OF RECURRENT MAJOR DEPRESSIVE DISORDER (H): ICD-10-CM

## 2024-08-26 DIAGNOSIS — F41.9 ANXIETY: ICD-10-CM

## 2024-08-26 NOTE — TELEPHONE ENCOUNTER
PA Initiation    Medication: ZEPBOUND 2.5 MG/0.5ML SC SOAJ  Insurance Company: [employer] - Phone [phone] Fax [phone]  Pharmacy Filling the Rx: [id number] - [place], [place] - [address]  Filling Pharmacy Phone: [phone]  Filling Pharmacy Fax: [phone]  Start Date: 8/26/2024

## 2024-08-27 RX ORDER — VENLAFAXINE HYDROCHLORIDE 37.5 MG/1
37.5 CAPSULE, EXTENDED RELEASE ORAL DAILY
Qty: 30 CAPSULE | Refills: 1 | OUTPATIENT
Start: 2024-08-27

## 2024-08-27 NOTE — TELEPHONE ENCOUNTER
Prior Authorization Approval    Medication: ZEPBOUND 2.5 MG/0.5ML SC SOAJ  Authorization Effective Date: 8/26/2024  Authorization Expiration Date: 2/22/2025  Approved Dose/Quantity: 1 month  Reference #: [id number]   Insurance Company: [employer] - Phone [phone] Fax [phone]  Expected CoPay: $    CoPay Card Available:      Financial Assistance Needed:    Which Pharmacy is filling the prescription: [id number] - [place], [place] - [address]  [address]   Pharmacy Notified: order sent to [facility]  Patient Notified: ii4bt message sent to patient

## 2024-08-27 NOTE — TELEPHONE ENCOUNTER
PA Approved for [facility]. Order sent to pharmacy and [employer] message sent to patient. Closing encounter

## 2024-09-03 ENCOUNTER — TELEPHONE (OUTPATIENT)
Dept: FAMILY MEDICINE | Facility: CLINIC | Age: 39
End: 2024-09-03
Payer: COMMERCIAL

## 2024-09-03 DIAGNOSIS — Z12.31 VISIT FOR SCREENING MAMMOGRAM: Primary | ICD-10-CM

## 2024-09-03 NOTE — TELEPHONE ENCOUNTER
Patient states that she is overdue for a mammogram. She called breast center scheduling to make an appointment but was advised to call PCP first. Patient is asking for an order.    Last mammogram was 08/03/22. Patient was recommended to have annual mammogram based on US breast on 03/03/23.

## 2024-09-04 NOTE — TELEPHONE ENCOUNTER
Called pt and relayed Osmar' message below. No symptoms. Pt will call Breast Center scheduling again to make appointment.     PETERSON Carrasco, RN     [facility]    09/04/2024 at 3:22 PM

## 2024-09-11 ENCOUNTER — HOSPITAL ENCOUNTER (OUTPATIENT)
Dept: MAMMOGRAPHY | Facility: CLINIC | Age: 39
Discharge: HOME | End: 2024-09-11
Attending: PHYSICIAN ASSISTANT | Admitting: PHYSICIAN ASSISTANT
Payer: COMMERCIAL

## 2024-09-11 DIAGNOSIS — Z12.31 VISIT FOR SCREENING MAMMOGRAM: ICD-10-CM

## 2024-09-11 PROCEDURE — 77063 BREAST TOMOSYNTHESIS BI: CPT

## 2024-09-17 DIAGNOSIS — F41.9 ANXIETY: ICD-10-CM

## 2024-09-17 DIAGNOSIS — F33.0 MILD EPISODE OF RECURRENT MAJOR DEPRESSIVE DISORDER (H): ICD-10-CM

## 2024-09-18 RX ORDER — VENLAFAXINE HYDROCHLORIDE 37.5 MG/1
37.5 CAPSULE, EXTENDED RELEASE ORAL DAILY
Qty: 90 CAPSULE | Refills: 1 | Status: SHIPPED | OUTPATIENT
Start: 2024-09-18

## 2024-09-30 ENCOUNTER — MYC REFILL (OUTPATIENT)
Dept: ENDOCRINOLOGY | Facility: CLINIC | Age: 39
End: 2024-09-30
Payer: COMMERCIAL

## 2024-09-30 DIAGNOSIS — E66.09 CLASS 1 OBESITY DUE TO EXCESS CALORIES WITH SERIOUS COMORBIDITY AND BODY MASS INDEX (BMI) OF 34.0 TO 34.9 IN ADULT: ICD-10-CM

## 2024-09-30 DIAGNOSIS — E66.811 CLASS 1 OBESITY DUE TO EXCESS CALORIES WITH SERIOUS COMORBIDITY AND BODY MASS INDEX (BMI) OF 34.0 TO 34.9 IN ADULT: ICD-10-CM

## 2024-10-10 ENCOUNTER — VIRTUAL VISIT (OUTPATIENT)
Dept: SURGERY | Facility: CLINIC | Age: 39
End: 2024-10-10
Payer: COMMERCIAL

## 2024-10-10 DIAGNOSIS — E66.09 CLASS 1 OBESITY DUE TO EXCESS CALORIES WITH SERIOUS COMORBIDITY AND BODY MASS INDEX (BMI) OF 34.0 TO 34.9 IN ADULT: Primary | ICD-10-CM

## 2024-10-10 DIAGNOSIS — E66.811 CLASS 1 OBESITY DUE TO EXCESS CALORIES WITH SERIOUS COMORBIDITY AND BODY MASS INDEX (BMI) OF 34.0 TO 34.9 IN ADULT: Primary | ICD-10-CM

## 2024-10-10 PROCEDURE — 97803 MED NUTRITION INDIV SUBSEQ: CPT | Mod: 95

## 2024-10-10 NOTE — PATIENT INSTRUCTIONS
Jean Carlos Blanco!      It was great chatting with you again today! Here's a summary of the goals we discussed:    1. Have small, frequent meals to better meet nutrient needs  - See sample meal plan below    2. Start a multivitamin w/minerals  - Examples: Women's One a Day or Thompson's Complete    3. Exercise 2-3x/week      Plan on following up in 1-2 months. This can be scheduled via our call center at . Reach out sooner with any questions or concerns. Have a great day!      Ailyn Higgins, MINERVA, LD?Clinical Dietitian       Small, Frequent Meals    Aim for 2-3 food groups every 3-4 hours    Example:    Mornin eggs + a slice of whole grain toast    Mid-morning: yogurt + berries    Lunch: tuna packet + crackers + baby carrots    Mid-afternoon: low-fat cheese stick + deli meat (roll-up) + sliced bell peppers    Dinner: meat + vegetable + starch    Evenin/2c cottage cheese or 1 cup of low-fat milk

## 2024-10-10 NOTE — PROGRESS NOTES
"Bella is a 38 year old who is being evaluated via a billable video visit.      The patient has been notified of following:     \"This video visit will be conducted via a call between you and your physician/provider. We have found that certain health care needs can be provided without the need for an in-person physical exam.  This service lets us provide the care you need with a video conversation.  If a prescription is necessary we can send it directly to your pharmacy.  If lab work is needed we can place an order for that and you can then stop by our lab to have the test done at a later time.    Video visits are billed at different rates depending on your insurance coverage.  Please reach out to your insurance provider with any questions.    If during the course of the call the physician/provider feels a video visit is not appropriate, you will not be charged for this service.\"    Patient has given verbal consent for Video visit? Yes    How would you like to obtain your AVS? MyChart    If the video visit is dropped, the invitation should be resent by: Text to cell phone: [phone]    Will anyone else be joining your video visit? No    I    Video-Visit Details    Type of service:  Phone    Originating Location (pt. Location): Home    Distant Location (provider location):   Off-Site - Provider Home Office    Platform used: phone    Video Start Time:  11:34 am    Video End Time: 11:55am    MEDICAL WEIGHT LOSS FOLLOW UP    Reason for visit: medical weight loss     DIAGNOSIS:  Class I Obesity    ANTHROPOMETRICS:  Initial Weight: 228 pounds  Current Weight:  217 lbs 0 oz   Weight Change: -11 lbs  BMI: 32.99 kg/m2    Weight Loss Medications:   Zepbound   Phentermine     NUTRITION HISTORY:  Breakfast: [wakes at 6:15am, eats at 7:30am] bowl of Raisin Bran (1% milk)  eggs (2) +/- ham or sausage  protein shake (PB2, almond milk, 1/2 scoop Shakeology powder + 1 coop protein powder, Benefiber)   Lunch: [1pm] protein shake " (see above)  turkey sticks + string cheese  hard boiled eggs + turkey stick  satndwich on low-carb bread (turkey, cheese, adkins mustard)   Dinner: [6pm] shredded beef sandwich  pork chop + vegetables or baked potato  [employer] pizza  leftovers (chicken stir cid)   Snacks: [morning] none  [afternoon] rare protein shake (Atkins)  [evenings] rare scoop of ice cream or protein shake   Beverage choices: water (80oz), coffee (8oz; half & half + creamer), protein shake, Coke Zero (12oz, 1-2x/week), Gi (occasional)  Eating behaviors: occasional boredom eating; more aware of it since starting AOM. Might grab an oreo or protein bar occasionally.     Dining Out:  How often do you dine out: 1-2x/week  What types of food do you order: pizza  Chipotle  2 chicken fingers from [employer]  [employer] sub     Exercise:  Inconsistent     Additional Information: Pt successful in reducing snacking and increasing protein. She is struggling with fatigue, likely secondary to minimal CHO intake. Discussed small, frequent meals to better meet nutrient needs, as well as MVT to supplement micronutrient needs until able to eat more robust meals.      EVALUATION/PROGRESS TOWARDS GOALS:  Previous Goals:   Add protein and fruit to breakfast - improving  Aim for 1/2 plate entree + 1/2 plate vegetables/fruit at dinner - not met  Have an afternoon snack - partially met    Previous Nutrition Diagnosis:  Obese class I related to excess energy intake and self-monitoring deficit as evidenced by BMI of 34.67 kg/m2.  No change, modified below     Current Nutrition Diagnosis:   Obese class I related to excess energy intake and self-monitoring deficit as evidenced by BMI of 32.99 kg/m2.    INTERVENTION:    Nutrition Prescription:  Recommend modified nutrient intake by decreasing energy intake.    Implementation:    Nutrition Education (Content):  Discussed previous goals and determined new goals  Encourage physical activity  Supported patient in  attempted weight loss and behavior changes  Congratulated patient on successful weight loss   Patient verbalizes understanding of diet by stating she will eat small, frequent meals.   Anticipate good compliance    Goals:  Have small, frequent meals  Start a daily  multivitamin w/minerals  Exercise 2-3x/week    Follow Up/Monitoring:  Other  -  patient to follow up in 4-8 weeks    Time Spent With Patient:  21 Minutes       Ailyn Higgins RD, LD  Clinical Dietitian

## 2024-10-11 ENCOUNTER — VIRTUAL VISIT (OUTPATIENT)
Dept: PHARMACY | Facility: CLINIC | Age: 39
End: 2024-10-11
Attending: PHYSICIAN ASSISTANT
Payer: COMMERCIAL

## 2024-10-11 VITALS — WEIGHT: 217 LBS | BODY MASS INDEX: 32.89 KG/M2 | HEIGHT: 68 IN

## 2024-10-11 DIAGNOSIS — E66.811 CLASS 1 OBESITY DUE TO EXCESS CALORIES WITH SERIOUS COMORBIDITY AND BODY MASS INDEX (BMI) OF 34.0 TO 34.9 IN ADULT: Primary | ICD-10-CM

## 2024-10-11 DIAGNOSIS — E66.09 CLASS 1 OBESITY DUE TO EXCESS CALORIES WITH SERIOUS COMORBIDITY AND BODY MASS INDEX (BMI) OF 34.0 TO 34.9 IN ADULT: Primary | ICD-10-CM

## 2024-10-11 DIAGNOSIS — K21.9 GASTROESOPHAGEAL REFLUX DISEASE, UNSPECIFIED WHETHER ESOPHAGITIS PRESENT: ICD-10-CM

## 2024-10-11 DIAGNOSIS — E03.9 HYPOTHYROIDISM: ICD-10-CM

## 2024-10-11 NOTE — PROGRESS NOTES
Medication Therapy Management (MTM) Encounter    ASSESSMENT:                            Medication Adherence/Access: No issues identified.    Weight management:   Current regimen effective and supportive healthy weight loss. Fatigue side effect could be associated with decreased nutrition intake with symptoms being most prominent during first days after injection and less consistent eating pattern over weekend. Reasonable to continue at current dose.     Hypothyroidism   Stable. Last TSH WNL. Metallic taste can be associated with topiramate although discontinuation of medication typically resolves side effect. Omeprazole can also induce taste changes, unclear why symptoms would be occurring now given no drug interactions present. Trial discontinuation of omeprazole could be worth seeing impact on taste and consider alternate therapy if needed. Change in multivitamin could also be a cause.     PLAN:                            Continue Zepbound 5 mg once weekly.   Monitor fatigue correlation with changes in patterns of food intake over the weekend. Aim to have consistent meals after taking dose and see how that changes your fatigue.   Consider trial discontinuation of omeprazole. Try famotidine for acid reflux symptoms instead.       Follow-up: 12/12/2024 with Trice Parsons PA-C; PharmD as needed     SUBJECTIVE/OBJECTIVE:                          Bella Scott is a 38 year old female seen for a follow-up visit.       Reason for visit: Zepbound follow-up.    Allergies/ADRs: Reviewed in chart  Past Medical History: Reviewed in chart  Tobacco: She reports that she has never smoked. She has never been exposed to tobacco smoke. She has never used smokeless tobacco.  Alcohol: Less than 1 beverage / month      Medication Adherence/Access: no issues reported    Weight Management   Zepbound 2.5 mg once weekly x 5 weeks   - reduced food noise, appetite. Did not want to increase to 5 mg dose with possibility of reducing  "appetite further.   - Feeling very fatigued, napping frequently. Working on exercising but sometimes doesn't feel she has the energy  - fatigue 2-4 days per week, day after injection most consistently - feels most tired in the morning after getting kids to school some days and at 4pm when  gets home from work. Unsure of correlation with food intake, daily activities, etc. Willing to track more intentionally to learn  - Protein shake for lunch if don't have one for breakfast, turkey stick, lunch meat with cheese otherwise   - injecting on Saturday night, tired mon/tues   - reocgnizing impact of stimulation in day and correlation to tiredness - days with work don't feel as fatigued     Phentermine 37.5 mg once daily - capsules in the past (15 mg), felt they were more effective - 3x using this medication; heart palpitations, fluttering, increased sweating; tablets seem to be better - limited side effects, feeling more energy (hx of chronic fatigue), has some decreased appetite - insatiable in the past       - weened off after first week of taking Zepbound    Nutrition/Eating Habits:  Goals from [id number]/[id number] registered dietitian visit:  Have small, frequent meals  Start a daily  multivitamin w/minerals  Exercise 2-3x/week    Going to start tracking foods overall - has been helpful in the past to learn patterns.     Exercise/Activity: walks outside, elliptical at home, home workouts ([employer])     Medications Tried/Failed:  See above:       Initial Consult Weight: 227 lbs (8/23/2024)         Wt Readings from Last 4 Encounters:   10/11/24 217 lb (98.4 kg)   08/23/24 227 lb (103 kg)   07/24/24 228 lb (103.4 kg)   07/24/24 228 lb (103.4 kg)     Estimated body mass index is 32.99 kg/m  as calculated from the following:    Height as of an earlier encounter on 10/11/24: 5' 8\" (1.727 m).    Weight as of an earlier encounter on 10/11/24: 217 lb (98.4 kg).    Hypothyroidism   Levothyroxine 100 mcg once daily   - weened off, " didn't feel it impact energy levels  Patient is having the following symptoms: hypothyroidism -  fatigue.   Patient reported TSH at OB appt 10/10/2024 was 0.9. Endocrinology visit on 10/28/2024.     Changed timing of levothyroxine dosing to evening.   Noticing metallic taste in mouth throughout the day now after switch.     Previous:   - AM: Phentermine, Omeprazole, Levothyroxine, Liothyronine   - PM: Venlafaxine, topiramate  Now:  - AM: Phentermine, Omeprazole  - PM: Levothyroxine, Venlafaxine          Today's Vitals: There were no vitals taken for this visit.  ----------------    I spent 40 minutes with this patient today. All changes were made via collaborative practice agreement with Prasad cMkinley. A copy of the visit note was provided to the patient's provider(s).    A summary of these recommendations was sent via [employer].    Lyubov Medrano, PharmD, BCACP  Medication Therapy Management (MTM) Pharmacist   [facility]      Telemedicine Visit Details  The patient's medications can be safely assessed via a telemedicine encounter.  Type of service:  Telephone visit  Originating Location (pt. Location): Home    Distant Location (provider location):  Off-site  Start Time:  11:15 AM  End Time:  11:55 AM     Medication Therapy Recommendations  Gastroesophageal reflux disease, unspecified whether esophagitis present    Rationale: Undesirable effect - Adverse medication event - Safety   Recommendation: Provide Education   Status: Patient Agreed - Adherence/Education

## 2024-10-18 NOTE — PATIENT INSTRUCTIONS
"Recommendations from today's MTM visit:                                                    Continue Zepbound 5 mg once weekly.   Monitor fatigue correlation with changes in patterns of food intake over the weekend. Aim to have consistent meals after taking dose and see how that changes your fatigue.   Consider trial discontinuation of omeprazole. Try famotidine for acid reflux symptoms instead.       Follow-up: 12/12/2024 with Trice Parsons PA-C; PharmD as needed     It was great speaking with you today.  I value your experience and would be very thankful for your time in providing feedback in our clinic survey. In the next few days, you may receive an email or text message from [facility] [employer] with a link to a survey related to your  clinical pharmacist.\"     To schedule another MTM appointment, please call the clinic directly or you may call the MTM scheduling line at [phone] or toll-free at [phone].     My Clinical Pharmacist's contact information:                                                      Please feel free to contact me with any questions or concerns you have.      Lyubov Medrano, PharmD, BCACP  Medication Therapy Management (MTM) Pharmacist   [facility]     "

## 2024-10-28 ENCOUNTER — OFFICE VISIT (OUTPATIENT)
Dept: ENDOCRINOLOGY | Facility: CLINIC | Age: 39
End: 2024-10-28
Payer: COMMERCIAL

## 2024-10-28 VITALS
RESPIRATION RATE: 16 BRPM | DIASTOLIC BLOOD PRESSURE: 80 MMHG | TEMPERATURE: 98.6 F | BODY MASS INDEX: 32.67 KG/M2 | SYSTOLIC BLOOD PRESSURE: 117 MMHG | OXYGEN SATURATION: 98 % | HEART RATE: 78 BPM | HEIGHT: 68 IN | WEIGHT: 215.6 LBS

## 2024-10-28 DIAGNOSIS — E66.812 CLASS 2 SEVERE OBESITY DUE TO EXCESS CALORIES WITH SERIOUS COMORBIDITY AND BODY MASS INDEX (BMI) OF 35.0 TO 35.9 IN ADULT (H): ICD-10-CM

## 2024-10-28 DIAGNOSIS — E66.01 CLASS 2 SEVERE OBESITY DUE TO EXCESS CALORIES WITH SERIOUS COMORBIDITY AND BODY MASS INDEX (BMI) OF 35.0 TO 35.9 IN ADULT (H): ICD-10-CM

## 2024-10-28 DIAGNOSIS — E04.1 THYROID NODULE: ICD-10-CM

## 2024-10-28 DIAGNOSIS — E03.4 HYPOTHYROIDISM DUE TO ACQUIRED ATROPHY OF THYROID: Primary | ICD-10-CM

## 2024-10-28 PROCEDURE — 99214 OFFICE O/P EST MOD 30 MIN: CPT | Performed by: PHYSICIAN ASSISTANT

## 2024-10-28 RX ORDER — PHENTERMINE HYDROCHLORIDE 37.5 MG/1
37.5 TABLET ORAL
Qty: 30 TABLET | Refills: 3 | Status: SHIPPED | OUTPATIENT
Start: 2024-10-28

## 2024-10-28 ASSESSMENT — PATIENT HEALTH QUESTIONNAIRE - PHQ9: SUM OF ALL RESPONSES TO PHQ QUESTIONS 1-9: 12

## 2024-10-28 NOTE — LETTER
10/28/2024      Bella Scott  [address]  [place] [facility] [id number]      Dear Colleague,    Thank you for referring your patient, Bella Scott, to the [facility]. Please see a copy of my visit note below.    Assessment/Plan :   Hypothyroidism. Bella remains stable on 100 mcg of levothyroxine daily. She has not had any problems with worsening anxiousness or irritability but she continues to struggle with fatigue. She is not sure if this is related to her thyroid. We reviewed her previous laboratory results and her thyroid level seems to be stable. We will continue with 100 mcg daily. We will also discuss getting a repeat thyroid US at the next visit.   Weight gain. Bella is working with the weight loss clinic. She was recently started on Zepbound. It seems to be working well to minimize food thoughts. She was recently told that her insurance will stop covering the medication, next year. She was wondering about other options and I think she may be a good candidate for Vyvanse. We will discuss this further at her next appt.      I have independently reviewed and interpreted labs, imaging as indicated.      Chief complaint:  Bella is a 38 year old female who returns for follow-up of hypothyroidism.     I have reviewed Care Everywhere including [facility], [facility], [facility], [facility] , [facility], [place] lab reports, imaging reports and provider notes as indicated.      HISTORY OF PRESENT ILLNESS  Bella is doing okay. She was recently started on Zepbound, which is helping with appetite suppression but it also seems to be contributing to fatigue. She states that there are days when she doesn't even want to get out of bed. She has continued to take phentermine because she is worried that if she was to stop it, she would not be able to function. She has also continued to take levothyroxine 100 mcg daily. She recently had laboratory testing completed and  her TSH was 0.9 uIU/mL. She does not feel like this is contributing to her fatigue. She also has not had any problems with worsening anxiousness or irritability.     Bella was originally diagnosed with thyroid disease about 15 yrs ago. She had been struggling with fatigue and weight gain, when laboratory testing found her thyroid level to be off. She was started on levothyroxine and remains on levothyroxine today. She is not sure if she has ever felt 100%. Over the years, she has battled exhaustion, weight gain, and brain fog. She understands the importance of weight loss but she has not been able to lose weight through diet and exercise alone. She has used phentermine, with some success, in the past. She was also recently started on Zepbound.    Endocrine relevant labs are as follows:   Latest Reference Range & Units 08/08/24 11:27   TSH 0.30 - 4.20 uIU/mL 0.31      Latest Reference Range & Units 08/08/24 11:27   T4 Free 0.90 - 1.70 ng/dL 1.14     REVIEW OF SYSTEMS    Endocrine: positive for thyroid disorder and weight gain  Skin: negative  Eyes: negative for, visual blurring, redness, tearing  Ears/Nose/Throat: negative for, hoarseness, feeling of fullness  Respiratory: No shortness of breath, dyspnea on exertion, cough, or hemoptysis  Cardiovascular: negative for, chest pain, dyspnea on exertion, lower extremity edema, and exercise intolerance  Gastrointestinal: positive for decreased appetite since starting Zepbound, negative for, nausea, vomiting, constipation, and diarrhea  Genitourinary: negative for, nocturia, dysuria, frequency, and urgency  Musculoskeletal: negative for, muscular weakness, nocturnal cramping, and foot pain  Neurologic: negative for and tremor  Psychiatric: positive for depression stable, and fatigue  Hematologic/Lymphatic/Immunologic: negative    Past Medical History  Past Medical History:   Diagnosis Date     Anemia, unspecified 3/24/2003    iron deficiney per pt     Contraception       "Unspecified hypothyroidism        Medications  Current Outpatient Medications   Medication Sig Dispense Refill     cetirizine (ZYRTEC) 10 MG tablet Take 1 tablet (10 mg) by mouth daily       levonorgestrel (MIRENA) 20 MCG/24HR IUD 1 each by Intrauterine route once       levothyroxine (SYNTHROID/LEVOTHROID) 100 MCG tablet Take 1 tablet (100 mcg) by mouth daily 90 tablet 0     Multiple Vitamin (MULTIVITAMIN ADULT PO) Take 1 tablet by mouth daily.       omeprazole (PRILOSEC) 20 MG DR capsule TAKE 1 CAPSULE (20 MG) BY MOUTH DAILY FOR 60 DAYS 90 capsule 0     phentermine (ADIPEX-P) 37.5 MG tablet TAKE 1 TABLET (37.5 MG) BY MOUTH EVERY MORNING (BEFORE BREAKFAST) 30 tablet 3     tirzepatide-Weight Management (ZEPBOUND) 5 MG/0.5ML prefilled pen Inject 0.5 mLs (5 mg) subcutaneously every 7 days. 2 mL 2     venlafaxine (EFFEXOR XR) 37.5 MG 24 hr capsule Take 1 capsule (37.5 mg) by mouth daily. 90 capsule 1       Allergies  Allergies   Allergen Reactions     No Known Allergies          Family History  family history includes Cancer in her maternal grandmother; Diabetes in her maternal grandfather; Family History Negative in her brother and father; Heart Disease in her paternal grandfather; Other - See Comments in her paternal grandmother; Thyroid Disease in her mother and sister.    Social History  Social History     Tobacco Use     Smoking status: Never     Passive exposure: Never     Smokeless tobacco: Never     Tobacco comments:     no 2nd hand smoke at home   Vaping Use     Vaping status: Never Used   Substance Use Topics     Alcohol use: Yes     Alcohol/week: 0.0 standard drinks of alcohol     Comment: once a month     Drug use: No       Physical Exam  /80 (BP Location: Left arm, Patient Position: Chair, Cuff Size: Adult Large)   Pulse 78   Temp 98.6  F (37  C) (Tympanic)   Resp 16   Ht 1.727 m (5' 7.99\")   Wt 97.8 kg (215 lb 9.6 oz)   LMP 09/21/2024   SpO2 98%   Breastfeeding No   BMI 32.79 kg/m    Body " mass index is 32.79 kg/m .  GENERAL :  In no apparent distress  SKIN: Normal color, normal temperature, texture.  No hirsutism, alopecia or purple striae.     EYES: PERRL, EOMI, No scleral icterus,  No proptosis, conjunctival redness, stare, retraction  NECK: No visible masses. No palpable adenopathy, or masses. No carotid bruits.   THYROID:  Normal, nontender, smooth / firm texture,  no nodules, no Bruit   RESP: Lungs clear to auscultation bilaterally  CARDIAC: Regular rate and rhythm, normal S1 S2, without murmurs, rubs or gallops    NEURO: awake, alert, responds appropriately to questions.  Cranial nerves intact.   Moves all extremities; Gait normal.  No tremor of the outstretched hand.  EXTREMITIES: No clubbing, cyanosis or edema.              Again, thank you for allowing me to participate in the care of your patient.        Sincerely,        Nadya Harrison PA-C

## 2024-10-28 NOTE — PROGRESS NOTES
Assessment/Plan :   Hypothyroidism. Bella remains stable on 100 mcg of levothyroxine daily. She has not had any problems with worsening anxiousness or irritability but she continues to struggle with fatigue. She is not sure if this is related to her thyroid. We reviewed her previous laboratory results and her thyroid level seems to be stable. We will continue with 100 mcg daily. We will also discuss getting a repeat thyroid US at the next visit.   Weight gain. Bella is working with the weight loss clinic. She was recently started on Zepbound. It seems to be working well to minimize food thoughts. She was recently told that her insurance will stop covering the medication, next year. She was wondering about other options and I think she may be a good candidate for Vyvanse. We will discuss this further at her next appt.      I have independently reviewed and interpreted labs, imaging as indicated.      Chief complaint:  Bella is a 38 year old female who returns for follow-up of hypothyroidism.     I have reviewed Care Everywhere including [facility], [facility], [facility], [facility], [facility] , [facility], [place] lab reports, imaging reports and provider notes as indicated.      HISTORY OF PRESENT ILLNESS  Bella is doing okay. She was recently started on Zepbound, which is helping with appetite suppression but it also seems to be contributing to fatigue. She states that there are days when she doesn't even want to get out of bed. She has continued to take phentermine because she is worried that if she was to stop it, she would not be able to function. She has also continued to take levothyroxine 100 mcg daily. She recently had laboratory testing completed and her TSH was 0.9 uIU/mL. She does not feel like this is contributing to her fatigue. She also has not had any problems with worsening anxiousness or irritability.     Bella was originally diagnosed with thyroid disease about 15 yrs ago. She  had been struggling with fatigue and weight gain, when laboratory testing found her thyroid level to be off. She was started on levothyroxine and remains on levothyroxine today. She is not sure if she has ever felt 100%. Over the years, she has battled exhaustion, weight gain, and brain fog. She understands the importance of weight loss but she has not been able to lose weight through diet and exercise alone. She has used phentermine, with some success, in the past. She was also recently started on Zepbound.    Endocrine relevant labs are as follows:   Latest Reference Range & Units 08/08/24 11:27   TSH 0.30 - 4.20 uIU/mL 0.31      Latest Reference Range & Units 08/08/24 11:27   T4 Free 0.90 - 1.70 ng/dL 1.14     REVIEW OF SYSTEMS    Endocrine: positive for thyroid disorder and weight gain  Skin: negative  Eyes: negative for, visual blurring, redness, tearing  Ears/Nose/Throat: negative for, hoarseness, feeling of fullness  Respiratory: No shortness of breath, dyspnea on exertion, cough, or hemoptysis  Cardiovascular: negative for, chest pain, dyspnea on exertion, lower extremity edema, and exercise intolerance  Gastrointestinal: positive for decreased appetite since starting Zepbound, negative for, nausea, vomiting, constipation, and diarrhea  Genitourinary: negative for, nocturia, dysuria, frequency, and urgency  Musculoskeletal: negative for, muscular weakness, nocturnal cramping, and foot pain  Neurologic: negative for and tremor  Psychiatric: positive for depression stable, and fatigue  Hematologic/Lymphatic/Immunologic: negative    Past Medical History  Past Medical History:   Diagnosis Date    Anemia, unspecified 3/24/2003    iron deficiney per pt    Contraception     Unspecified hypothyroidism        Medications  Current Outpatient Medications   Medication Sig Dispense Refill    cetirizine (ZYRTEC) 10 MG tablet Take 1 tablet (10 mg) by mouth daily      levonorgestrel (MIRENA) 20 MCG/24HR IUD 1 each by  "Intrauterine route once      levothyroxine (SYNTHROID/LEVOTHROID) 100 MCG tablet Take 1 tablet (100 mcg) by mouth daily 90 tablet 0    Multiple Vitamin (MULTIVITAMIN ADULT PO) Take 1 tablet by mouth daily.      omeprazole (PRILOSEC) 20 MG DR capsule TAKE 1 CAPSULE (20 MG) BY MOUTH DAILY FOR 60 DAYS 90 capsule 0    phentermine (ADIPEX-P) 37.5 MG tablet TAKE 1 TABLET (37.5 MG) BY MOUTH EVERY MORNING (BEFORE BREAKFAST) 30 tablet 3    tirzepatide-Weight Management (ZEPBOUND) 5 MG/0.5ML prefilled pen Inject 0.5 mLs (5 mg) subcutaneously every 7 days. 2 mL 2    venlafaxine (EFFEXOR XR) 37.5 MG 24 hr capsule Take 1 capsule (37.5 mg) by mouth daily. 90 capsule 1       Allergies  Allergies   Allergen Reactions    No Known Allergies          Family History  family history includes Cancer in her maternal grandmother; Diabetes in her maternal grandfather; Family History Negative in her brother and father; Heart Disease in her paternal grandfather; Other - See Comments in her paternal grandmother; Thyroid Disease in her mother and sister.    Social History  Social History     Tobacco Use    Smoking status: Never     Passive exposure: Never    Smokeless tobacco: Never    Tobacco comments:     no 2nd hand smoke at home   Vaping Use    Vaping status: Never Used   Substance Use Topics    Alcohol use: Yes     Alcohol/week: 0.0 standard drinks of alcohol     Comment: once a month    Drug use: No       Physical Exam  /80 (BP Location: Left arm, Patient Position: Chair, Cuff Size: Adult Large)   Pulse 78   Temp 98.6  F (37  C) (Tympanic)   Resp 16   Ht 1.727 m (5' 7.99\")   Wt 97.8 kg (215 lb 9.6 oz)   LMP 09/21/2024   SpO2 98%   Breastfeeding No   BMI 32.79 kg/m    Body mass index is 32.79 kg/m .  GENERAL :  In no apparent distress  SKIN: Normal color, normal temperature, texture.  No hirsutism, alopecia or purple striae.     EYES: PERRL, EOMI, No scleral icterus,  No proptosis, conjunctival redness, stare, " retraction  NECK: No visible masses. No palpable adenopathy, or masses. No carotid bruits.   THYROID:  Normal, nontender, smooth / firm texture,  no nodules, no Bruit   RESP: Lungs clear to auscultation bilaterally  CARDIAC: Regular rate and rhythm, normal S1 S2, without murmurs, rubs or gallops    NEURO: awake, alert, responds appropriately to questions.  Cranial nerves intact.   Moves all extremities; Gait normal.  No tremor of the outstretched hand.  EXTREMITIES: No clubbing, cyanosis or edema.

## 2024-10-28 NOTE — PATIENT INSTRUCTIONS
[facility]  Dr Domingo, Endocrinology Department    [address] [id number]  [place] [address]  Appointment [phone]  Fax: [phone]  [place]: Monday - Thursday

## 2024-11-18 DIAGNOSIS — E66.811 CLASS 1 OBESITY DUE TO EXCESS CALORIES WITH SERIOUS COMORBIDITY AND BODY MASS INDEX (BMI) OF 34.0 TO 34.9 IN ADULT: ICD-10-CM

## 2024-11-18 DIAGNOSIS — K21.9 GASTROESOPHAGEAL REFLUX DISEASE, UNSPECIFIED WHETHER ESOPHAGITIS PRESENT: ICD-10-CM

## 2024-11-18 DIAGNOSIS — E66.09 CLASS 1 OBESITY DUE TO EXCESS CALORIES WITH SERIOUS COMORBIDITY AND BODY MASS INDEX (BMI) OF 34.0 TO 34.9 IN ADULT: ICD-10-CM

## 2024-11-18 DIAGNOSIS — E03.4 HYPOTHYROIDISM DUE TO ACQUIRED ATROPHY OF THYROID: ICD-10-CM

## 2024-11-18 RX ORDER — LEVOTHYROXINE SODIUM 100 UG/1
100 TABLET ORAL DAILY
Qty: 90 TABLET | Refills: 1 | Status: SHIPPED | OUTPATIENT
Start: 2024-11-18

## 2024-11-18 NOTE — TELEPHONE ENCOUNTER
Requested Prescriptions   Pending Prescriptions Disp Refills    levothyroxine (SYNTHROID/LEVOTHROID) 100 MCG tablet [Pharmacy Med Name: LEVOTHYROXINE 100 MCG TABLET] 90 tablet 0     Sig: TAKE 1 TABLET BY MOUTH EVERY DAY       Thyroid Protocol Passed - 11/18/2024 11:08 AM        Passed - Patient is 12 years or older        Passed - Medication is active on med list        Passed - Recent (12 mo) or future (90 days) visit within the authorizing provider's specialty     The patient must have completed an in-person or virtual visit within the past 12 months or has a future visit scheduled within the next 90 days with the authorizing provider s specialty.  Urgent care and e-visits do not qualify as an office visit for this protocol.          Passed - Medication indicated for associated diagnosis     Medication is associated with one or more of the following diagnoses:  Hypothyroidism  Thyroid stimulating hormone suppression therapy  Thyroid cancer  Acquired atrophy of thyroid          Passed - Normal TSH on file in past 12 months     Recent Labs   Lab Test 08/08/24  1127   TSH 0.31              Passed - No active pregnancy on record     If patient is pregnant or has had a positive pregnancy test, please check TSH.          Passed - No positive pregnancy test in past 12 months     If patient is pregnant or has had a positive pregnancy test, please check TSH.

## 2024-12-09 NOTE — PROGRESS NOTES
2024      Return Medical Weight Management Note     Bella Scott  MRN:  [id number]  :  1985    Assessment & Plan   Problem List Items Addressed This Visit       Class 1 obesity due to excess calories without serious comorbidity with body mass index (BMI) of 32.0 to 32.9 in adult - Primary     Not tolerating Zepbound.  No coverage .  Recommend we decrease to 2.5 mg and increase dose duration to 2 weeks  (Pt prefers to stay on 5 mg but go to q 2 weeks and try to manage symptoms until gone and then decrease to 2.5 mg dose.     Start Metformin when switching to 2.5 mg of Zepbound.  Continue once Zepbound is exhausted.     Continue phentermine.    Follow-up with ELADIO Mcarthur in 6-8 weeks to check in on how med changes are going.          Relevant Medications    tirzepatide-Weight Management (ZEPBOUND) 2.5 MG/0.5ML prefilled pen    metFORMIN (GLUCOPHAGE XR) 500 MG 24 hr tablet        AOM Considerations:  Phentermine: Currently taking 37.5 mg in AM which she has been on x 3 months with 6 lb weight loss. Has also been on this one other time in the past. Has had heart palpitations in the past on pill vs capsule version. On Phentermine capsule wt loss was sig,Was also doing exercise and activity, but had palpitations.    Topiramate: On medication list as 25 mg BID, but actually was on 12.5 mg twice daily. not started due to trying it once and getting a headache the next day and then brain fog.    GLP-1: Candidate,  on Zepbound and causes sig.fatigue/ nausea/ diarrhea on 5 mg.                  Naltrexone: Candidate   Wellbutrin: On venlafaxine        Metformin: Candidate       PATIENT INSTRUCTIONS:  Continue 5 mg  continue injection of Zepbound but increase time between dosing to 2 to 4 weeks.  Then decrease to 2.5 mg every 2 to 4 weeks until gone    Start Metformin when when starting lower dose of Zepbound.  Directions: Take 1 tablet by mouth daily with a meal for 1 week, then increase to 2 tablets  daily with a meal, if tolerating can increase to 3 tablets daily with a meal or at bedtime.     Please call [phone] to schedule with a medical therapy management pharmacist in Feb/March    Continue Phentermine 37.5 mg tablet weekly.     Follow up:    Call [phone] to schedule next visit in May/Sumi    40 minutes spent on the date of the encounter doing chart review, history and exam, result review, counseling, developing plan of care, documentation, and further activities as noted      INTERVAL HISTORY:  Bella returns for medical weight management follow up.  Last seen on 7/24/24 for MWL initial with Praasd Mckinley PA-C.  Continued Phentermine 37.5 mg daily- Rx with PCP.  Stopped Topiramate- due to Zepbound start.  Started Zepbound.   Prior to Zepbound had food noise, cravings,  and was hungry all the time.  This has now resolved but side effects are significant.Zepbound and causes sig.fatigue/ nausea/ diarrhea/ dysgeusia on 5 mg.    Is [facility] patient has no coverage in 2025.    Has significant trepidation going off/ down on her GLP-1.      WEIGHT METRICS:  Body mass index is 32.02 kg/m .   Current Weight: 210 lb 8 oz (95.5 kg)  Last Visits Weight: 228 lb (103.4 kg)  Initial Weight (lbs): 228 lbs  Cumulative weight loss (lbs): 17.5  Weight Loss Percentage: 7.68%    Wt Readings from Last 10 Encounters:   12/12/24 210 lb 8 oz (95.5 kg)   10/28/24 215 lb 9.6 oz (97.8 kg)   10/11/24 217 lb (98.4 kg)   08/23/24 227 lb (103 kg)   07/24/24 228 lb (103.4 kg)   07/24/24 228 lb (103.4 kg)   06/24/24 226 lb 4.8 oz (102.6 kg)   05/20/24 230 lb 12.8 oz (104.7 kg)   03/04/24 234 lb 12.8 oz (106.5 kg)   11/15/23 215 lb (97.5 kg)                 12/11/2024     2:40 PM   Weight Loss Medication History Reviewed With Patient   Which weight loss medications are you currently taking on a regular basis? Phentermine   Are you having any side effects from the weight loss medication that we have prescribed you? Yes   If you are  "having side effects please describe: Zepbound 5mg- Nausea, upset stomach, diarrhea, fatigue           12/11/2024     2:40 PM   Changes and Difficulties   I have made the following changes to my diet since my last visit: Eating fewer calories, eating smaller more frequent  meals, increased protein intake   With regards to my diet, I am still struggling with: Not eating enough vegetables, not eating enough protein   I have made the following changes to my activity/exercise since my last visit: I exercise here and there   With regards to my activity/exercise, I am still struggling with: I exercise about twice a week but seem to have a mental block and struggle with being active.    Im tired ALL THE TIME and have alot of nausea and upset stomach since starting 5mg dose.       LABS:  Labs reviewed in Epic    BP Readings from Last 6 Encounters:   12/12/24 129/86   10/28/24 117/80   07/24/24 126/86   06/24/24 122/81   05/20/24 118/80   03/04/24 116/77       Pulse Readings from Last 6 Encounters:   12/12/24 94   10/28/24 78   07/24/24 88   06/24/24 88   05/20/24 80   03/04/24 68       PE:  /86   Pulse 94   Ht 5' 7.99\" (1.727 m)   Wt 210 lb 8 oz (95.5 kg)   LMP 09/21/2024   SpO2 98%   BMI 32.02 kg/m    GENERAL: Healthy, alert and no distress  CV: Regular rate and rhythm without murmurs, rubs, or gallops.  RESP: Lungs are clear to auscultation bilaterally with good breath sounds. No audible wheeze, cough, or visible cyanosis.  No visible retractions or increased work of breathing.    SKIN: Visible skin clear. No significant rash, abnormal pigmentation or lesions.  PSYCH: Mentation appears normal, affect normal/bright, judgement and insight intact        "

## 2024-12-12 ENCOUNTER — OFFICE VISIT (OUTPATIENT)
Dept: SURGERY | Facility: CLINIC | Age: 39
End: 2024-12-12
Payer: COMMERCIAL

## 2024-12-12 VITALS
HEIGHT: 68 IN | HEART RATE: 94 BPM | OXYGEN SATURATION: 98 % | WEIGHT: 210.5 LBS | DIASTOLIC BLOOD PRESSURE: 86 MMHG | SYSTOLIC BLOOD PRESSURE: 129 MMHG | BODY MASS INDEX: 31.9 KG/M2

## 2024-12-12 DIAGNOSIS — E66.811 CLASS 1 OBESITY DUE TO EXCESS CALORIES WITHOUT SERIOUS COMORBIDITY WITH BODY MASS INDEX (BMI) OF 32.0 TO 32.9 IN ADULT: Primary | ICD-10-CM

## 2024-12-12 DIAGNOSIS — E66.09 CLASS 1 OBESITY DUE TO EXCESS CALORIES WITHOUT SERIOUS COMORBIDITY WITH BODY MASS INDEX (BMI) OF 32.0 TO 32.9 IN ADULT: Primary | ICD-10-CM

## 2024-12-12 RX ORDER — METFORMIN HYDROCHLORIDE 500 MG/1
TABLET, EXTENDED RELEASE ORAL
Qty: 270 TABLET | Refills: 1 | Status: SHIPPED | OUTPATIENT
Start: 2024-12-12 | End: 2025-06-30

## 2024-12-12 NOTE — ASSESSMENT & PLAN NOTE
Not tolerating Zepbound.  No coverage 2025.  Recommend we decrease to 2.5 mg and increase dose duration to 2 weeks  (Pt prefers to stay on 5 mg but go to q 2 weeks and try to manage symptoms until gone and then decrease to 2.5 mg dose.     Start Metformin when switching to 2.5 mg of Zepbound.  Continue once Zepbound is exhausted.     Continue phentermine.    Follow-up with ELADIO Mcarthur in 6-8 weeks to check in on how med changes are going.

## 2024-12-12 NOTE — PATIENT INSTRUCTIONS
Nice to talk with you today. Below is the plan discussed.-  CARMITA Carnes      Pt Instructions:  Continue 5 mg  continue injection of Zepbound but increase time between dosing to 2 to 4 weeks.  Then decrease to 2.5 mg every 2 to 4 weeks until gone    Start Metformin when when starting lower dose of Zepbound.  Directions: Take 1 tablet by mouth daily with a meal for 1 week, then increase to 2 tablets daily with a meal, if tolerating can increase to 3 tablets daily with a meal or at bedtime.     Please call [phone] to schedule with a medical therapy management pharmacist in Feb/March    Continue Phentermine 37.5 mg tablet weekly.     Follow up:    Call [phone] to schedule next visit in May/Sumi    METFORMIN    Metformin is a medication that is used to assist with lowering blood sugars in patients that have Pre-Diabetes or Diabetes. It has also been found to help with modest weight loss.    Metformin helps to lower blood sugars by lowering the amount of sugar (glucose) your liver produces that would otherwise cause your blood sugar to increase. It also makes your body respond better to insulin (the product that lowers your blood sugar).     Emerging research reported in journals suggests metformin may also lead to weight loss as a result of changes in the appetite centers of the brain, shifts in the gut microbiome, and reversal of metabolic changes that usually happen with age.    Starting instructions:  Take 1 tablet by mouth daily with a meal for 1 week, then increase to 2 tablets daily with a meal, if tolerating can increase to 3 tablets daily with a meal or at bedtime.     Side-effects. Metformin is generally well tolerated. The main side-effects we see are: Diarrhea, nausea and stomach upset - this tends to subside over time as your body gets used to the medication. Taking this medications with food will lower these side effects.    For any questions or concerns please send a [employer] message to our  team or call our weight management call center at [phone].     In order to get refills of this or any medication we prescribe you must be seen in the medical weight mgmt clinic every 6 months.

## 2024-12-14 DIAGNOSIS — E66.811 CLASS 1 OBESITY DUE TO EXCESS CALORIES WITH SERIOUS COMORBIDITY AND BODY MASS INDEX (BMI) OF 34.0 TO 34.9 IN ADULT: ICD-10-CM

## 2024-12-14 DIAGNOSIS — K21.9 GASTROESOPHAGEAL REFLUX DISEASE, UNSPECIFIED WHETHER ESOPHAGITIS PRESENT: ICD-10-CM

## 2024-12-14 DIAGNOSIS — E66.09 CLASS 1 OBESITY DUE TO EXCESS CALORIES WITH SERIOUS COMORBIDITY AND BODY MASS INDEX (BMI) OF 34.0 TO 34.9 IN ADULT: ICD-10-CM

## 2025-03-24 DIAGNOSIS — F33.0 MILD EPISODE OF RECURRENT MAJOR DEPRESSIVE DISORDER: ICD-10-CM

## 2025-03-24 DIAGNOSIS — F41.9 ANXIETY: ICD-10-CM

## 2025-03-24 RX ORDER — VENLAFAXINE HYDROCHLORIDE 37.5 MG/1
37.5 CAPSULE, EXTENDED RELEASE ORAL DAILY
Qty: 30 CAPSULE | Refills: 5 | Status: SHIPPED | OUTPATIENT
Start: 2025-03-24

## 2025-03-25 ENCOUNTER — MYC REFILL (OUTPATIENT)
Dept: ENDOCRINOLOGY | Facility: CLINIC | Age: 40
End: 2025-03-25
Payer: COMMERCIAL

## 2025-03-25 DIAGNOSIS — E66.812 CLASS 2 SEVERE OBESITY DUE TO EXCESS CALORIES WITH SERIOUS COMORBIDITY AND BODY MASS INDEX (BMI) OF 35.0 TO 35.9 IN ADULT (H): ICD-10-CM

## 2025-03-25 DIAGNOSIS — E66.01 CLASS 2 SEVERE OBESITY DUE TO EXCESS CALORIES WITH SERIOUS COMORBIDITY AND BODY MASS INDEX (BMI) OF 35.0 TO 35.9 IN ADULT (H): ICD-10-CM

## 2025-03-26 RX ORDER — PHENTERMINE HYDROCHLORIDE 37.5 MG/1
37.5 TABLET ORAL
Qty: 30 TABLET | Refills: 3 | Status: SHIPPED | OUTPATIENT
Start: 2025-03-26

## 2025-04-26 ENCOUNTER — HOSPITAL ENCOUNTER (OUTPATIENT)
Facility: CLINIC | Age: 40
Discharge: HOME | End: 2025-04-26
Attending: EMERGENCY MEDICINE | Admitting: EMERGENCY MEDICINE
Payer: COMMERCIAL

## 2025-04-26 ENCOUNTER — ANESTHESIA EVENT (OUTPATIENT)
Dept: SURGERY | Facility: CLINIC | Age: 40
End: 2025-04-26
Payer: COMMERCIAL

## 2025-04-26 ENCOUNTER — ANESTHESIA (OUTPATIENT)
Dept: SURGERY | Facility: CLINIC | Age: 40
End: 2025-04-26
Payer: COMMERCIAL

## 2025-04-26 ENCOUNTER — APPOINTMENT (OUTPATIENT)
Dept: ULTRASOUND IMAGING | Facility: CLINIC | Age: 40
End: 2025-04-26
Attending: EMERGENCY MEDICINE
Payer: COMMERCIAL

## 2025-04-26 VITALS
WEIGHT: 206.2 LBS | TEMPERATURE: 97.3 F | HEART RATE: 73 BPM | DIASTOLIC BLOOD PRESSURE: 87 MMHG | OXYGEN SATURATION: 100 % | RESPIRATION RATE: 14 BRPM | SYSTOLIC BLOOD PRESSURE: 118 MMHG | HEIGHT: 68 IN | BODY MASS INDEX: 31.25 KG/M2

## 2025-04-26 DIAGNOSIS — E03.4 HYPOTHYROIDISM DUE TO ACQUIRED ATROPHY OF THYROID: ICD-10-CM

## 2025-04-26 DIAGNOSIS — K81.0 ACUTE CHOLECYSTITIS: Primary | ICD-10-CM

## 2025-04-26 LAB
ALBUMIN SERPL BCG-MCNC: 4.9 G/DL (ref 3.5–5.2)
ALBUMIN UR-MCNC: 50 MG/DL
ALP SERPL-CCNC: 113 U/L (ref 40–150)
ALT SERPL W P-5'-P-CCNC: 135 U/L (ref 0–50)
ANION GAP SERPL CALCULATED.3IONS-SCNC: 12 MMOL/L (ref 7–15)
APPEARANCE UR: CLEAR
AST SERPL W P-5'-P-CCNC: 235 U/L (ref 0–45)
BASOPHILS # BLD AUTO: 0.1 10E3/UL (ref 0–0.2)
BASOPHILS NFR BLD AUTO: 1 %
BILIRUB SERPL-MCNC: 0.7 MG/DL
BILIRUB UR QL STRIP: ABNORMAL
BUN SERPL-MCNC: 17.4 MG/DL (ref 6–20)
CALCIUM SERPL-MCNC: 9.5 MG/DL (ref 8.8–10.4)
CHLORIDE SERPL-SCNC: 103 MMOL/L (ref 98–107)
COLOR UR AUTO: YELLOW
CREAT SERPL-MCNC: 0.92 MG/DL (ref 0.51–0.95)
EGFRCR SERPLBLD CKD-EPI 2021: 80 ML/MIN/1.73M2
EOSINOPHIL # BLD AUTO: 0.1 10E3/UL (ref 0–0.7)
EOSINOPHIL NFR BLD AUTO: 1 %
ERYTHROCYTE [DISTWIDTH] IN BLOOD BY AUTOMATED COUNT: 12.2 % (ref 10–15)
GLUCOSE SERPL-MCNC: 124 MG/DL (ref 70–99)
GLUCOSE UR STRIP-MCNC: NEGATIVE MG/DL
HCG SERPL QL: NEGATIVE
HCO3 SERPL-SCNC: 26 MMOL/L (ref 22–29)
HCT VFR BLD AUTO: 38.8 % (ref 35–47)
HGB BLD-MCNC: 12.9 G/DL (ref 11.7–15.7)
HGB UR QL STRIP: NEGATIVE
HOLD SPECIMEN: NORMAL
IMM GRANULOCYTES # BLD: 0.1 10E3/UL
IMM GRANULOCYTES NFR BLD: 1 %
KETONES UR STRIP-MCNC: NEGATIVE MG/DL
LEUKOCYTE ESTERASE UR QL STRIP: NEGATIVE
LIPASE SERPL-CCNC: 30 U/L (ref 13–60)
LYMPHOCYTES # BLD AUTO: 2.4 10E3/UL (ref 0.8–5.3)
LYMPHOCYTES NFR BLD AUTO: 24 %
MCH RBC QN AUTO: 28.9 PG (ref 26.5–33)
MCHC RBC AUTO-ENTMCNC: 33.2 G/DL (ref 31.5–36.5)
MCV RBC AUTO: 87 FL (ref 78–100)
MONOCYTES # BLD AUTO: 0.7 10E3/UL (ref 0–1.3)
MONOCYTES NFR BLD AUTO: 7 %
MUCOUS THREADS #/AREA URNS LPF: PRESENT /LPF
NEUTROPHILS # BLD AUTO: 6.7 10E3/UL (ref 1.6–8.3)
NEUTROPHILS NFR BLD AUTO: 67 %
NITRATE UR QL: NEGATIVE
NRBC # BLD AUTO: 0 10E3/UL
NRBC BLD AUTO-RTO: 0 /100
PH UR STRIP: 7 [PH] (ref 5–7)
PLATELET # BLD AUTO: 300 10E3/UL (ref 150–450)
POTASSIUM SERPL-SCNC: 3.9 MMOL/L (ref 3.4–5.3)
PROT SERPL-MCNC: 7.5 G/DL (ref 6.4–8.3)
RBC # BLD AUTO: 4.46 10E6/UL (ref 3.8–5.2)
RBC URINE: 7 /HPF
SODIUM SERPL-SCNC: 141 MMOL/L (ref 135–145)
SP GR UR STRIP: 1.02 (ref 1–1.03)
SQUAMOUS EPITHELIAL: <1 /HPF
TROPONIN T SERPL HS-MCNC: 6 NG/L
UROBILINOGEN UR STRIP-MCNC: 12 MG/DL
WBC # BLD AUTO: 10 10E3/UL (ref 4–11)
WBC URINE: 3 /HPF

## 2025-04-26 PROCEDURE — 47562 LAPAROSCOPIC CHOLECYSTECTOMY: CPT | Performed by: STUDENT IN AN ORGANIZED HEALTH CARE EDUCATION/TRAINING PROGRAM

## 2025-04-26 PROCEDURE — 93005 ELECTROCARDIOGRAM TRACING: CPT

## 2025-04-26 PROCEDURE — 99214 OFFICE O/P EST MOD 30 MIN: CPT | Mod: 57 | Performed by: STUDENT IN AN ORGANIZED HEALTH CARE EDUCATION/TRAINING PROGRAM

## 2025-04-26 PROCEDURE — 81001 URINALYSIS AUTO W/SCOPE: CPT | Performed by: EMERGENCY MEDICINE

## 2025-04-26 PROCEDURE — 250N000009 HC RX 250: Performed by: STUDENT IN AN ORGANIZED HEALTH CARE EDUCATION/TRAINING PROGRAM

## 2025-04-26 PROCEDURE — 250N000009 HC RX 250: Performed by: NURSE ANESTHETIST, CERTIFIED REGISTERED

## 2025-04-26 PROCEDURE — 85025 COMPLETE CBC W/AUTO DIFF WBC: CPT | Performed by: EMERGENCY MEDICINE

## 2025-04-26 PROCEDURE — 84703 CHORIONIC GONADOTROPIN ASSAY: CPT | Performed by: EMERGENCY MEDICINE

## 2025-04-26 PROCEDURE — S2900 ROBOTIC SURGICAL SYSTEM: HCPCS | Performed by: STUDENT IN AN ORGANIZED HEALTH CARE EDUCATION/TRAINING PROGRAM

## 2025-04-26 PROCEDURE — 250N000011 HC RX IP 250 OP 636: Performed by: STUDENT IN AN ORGANIZED HEALTH CARE EDUCATION/TRAINING PROGRAM

## 2025-04-26 PROCEDURE — 258N000003 HC RX IP 258 OP 636: Performed by: EMERGENCY MEDICINE

## 2025-04-26 PROCEDURE — 99285 EMERGENCY DEPT VISIT HI MDM: CPT | Mod: 25

## 2025-04-26 PROCEDURE — 96365 THER/PROPH/DIAG IV INF INIT: CPT | Mod: 59

## 2025-04-26 PROCEDURE — 360N000080 HC SURGERY LEVEL 7, PER MIN: Performed by: STUDENT IN AN ORGANIZED HEALTH CARE EDUCATION/TRAINING PROGRAM

## 2025-04-26 PROCEDURE — 96375 TX/PRO/DX INJ NEW DRUG ADDON: CPT | Mod: 59

## 2025-04-26 PROCEDURE — 83690 ASSAY OF LIPASE: CPT | Performed by: EMERGENCY MEDICINE

## 2025-04-26 PROCEDURE — 258N000003 HC RX IP 258 OP 636: Performed by: NURSE ANESTHETIST, CERTIFIED REGISTERED

## 2025-04-26 PROCEDURE — 250N000011 HC RX IP 250 OP 636: Performed by: EMERGENCY MEDICINE

## 2025-04-26 PROCEDURE — 370N000017 HC ANESTHESIA TECHNICAL FEE, PER MIN: Performed by: STUDENT IN AN ORGANIZED HEALTH CARE EDUCATION/TRAINING PROGRAM

## 2025-04-26 PROCEDURE — 250N000011 HC RX IP 250 OP 636: Mod: JZ | Performed by: EMERGENCY MEDICINE

## 2025-04-26 PROCEDURE — 710N000009 HC RECOVERY PHASE 1, LEVEL 1, PER MIN: Performed by: STUDENT IN AN ORGANIZED HEALTH CARE EDUCATION/TRAINING PROGRAM

## 2025-04-26 PROCEDURE — 84155 ASSAY OF PROTEIN SERUM: CPT | Performed by: EMERGENCY MEDICINE

## 2025-04-26 PROCEDURE — 710N000012 HC RECOVERY PHASE 2, PER MINUTE: Performed by: STUDENT IN AN ORGANIZED HEALTH CARE EDUCATION/TRAINING PROGRAM

## 2025-04-26 PROCEDURE — 250N000011 HC RX IP 250 OP 636: Performed by: NURSE ANESTHETIST, CERTIFIED REGISTERED

## 2025-04-26 PROCEDURE — 999N000141 HC STATISTIC PRE-PROCEDURE NURSING ASSESSMENT: Performed by: STUDENT IN AN ORGANIZED HEALTH CARE EDUCATION/TRAINING PROGRAM

## 2025-04-26 PROCEDURE — 47562 LAPAROSCOPIC CHOLECYSTECTOMY: CPT | Mod: AS | Performed by: PHYSICIAN ASSISTANT

## 2025-04-26 PROCEDURE — 76705 ECHO EXAM OF ABDOMEN: CPT

## 2025-04-26 PROCEDURE — 84439 ASSAY OF FREE THYROXINE: CPT

## 2025-04-26 PROCEDURE — 272N000001 HC OR GENERAL SUPPLY STERILE: Performed by: STUDENT IN AN ORGANIZED HEALTH CARE EDUCATION/TRAINING PROGRAM

## 2025-04-26 PROCEDURE — 250N000011 HC RX IP 250 OP 636: Mod: JZ | Performed by: ANESTHESIOLOGY

## 2025-04-26 PROCEDURE — 250N000011 HC RX IP 250 OP 636: Mod: JZ | Performed by: NURSE ANESTHETIST, CERTIFIED REGISTERED

## 2025-04-26 PROCEDURE — 36415 COLL VENOUS BLD VENIPUNCTURE: CPT | Performed by: EMERGENCY MEDICINE

## 2025-04-26 PROCEDURE — 84484 ASSAY OF TROPONIN QUANT: CPT | Performed by: EMERGENCY MEDICINE

## 2025-04-26 PROCEDURE — 84443 ASSAY THYROID STIM HORMONE: CPT

## 2025-04-26 PROCEDURE — 250N000013 HC RX MED GY IP 250 OP 250 PS 637: Performed by: STUDENT IN AN ORGANIZED HEALTH CARE EDUCATION/TRAINING PROGRAM

## 2025-04-26 PROCEDURE — 88304 TISSUE EXAM BY PATHOLOGIST: CPT | Mod: TC | Performed by: STUDENT IN AN ORGANIZED HEALTH CARE EDUCATION/TRAINING PROGRAM

## 2025-04-26 RX ORDER — CEFAZOLIN SODIUM/WATER 2 G/20 ML
2 SYRINGE (ML) INTRAVENOUS SEE ADMIN INSTRUCTIONS
Status: DISCONTINUED | OUTPATIENT
Start: 2025-04-26 | End: 2025-04-26 | Stop reason: HOSPADM

## 2025-04-26 RX ORDER — SODIUM CHLORIDE, SODIUM LACTATE, POTASSIUM CHLORIDE, CALCIUM CHLORIDE 600; 310; 30; 20 MG/100ML; MG/100ML; MG/100ML; MG/100ML
INJECTION, SOLUTION INTRAVENOUS CONTINUOUS PRN
Status: DISCONTINUED | OUTPATIENT
Start: 2025-04-26 | End: 2025-04-26

## 2025-04-26 RX ORDER — ONDANSETRON 2 MG/ML
INJECTION INTRAMUSCULAR; INTRAVENOUS PRN
Status: DISCONTINUED | OUTPATIENT
Start: 2025-04-26 | End: 2025-04-26

## 2025-04-26 RX ORDER — HYDRALAZINE HYDROCHLORIDE 20 MG/ML
2.5-5 INJECTION INTRAMUSCULAR; INTRAVENOUS EVERY 10 MIN PRN
Status: DISCONTINUED | OUTPATIENT
Start: 2025-04-26 | End: 2025-04-26 | Stop reason: HOSPADM

## 2025-04-26 RX ORDER — OXYCODONE HYDROCHLORIDE 5 MG/1
5-10 TABLET ORAL EVERY 4 HOURS PRN
Qty: 6 TABLET | Refills: 0 | Status: SHIPPED | OUTPATIENT
Start: 2025-04-26 | End: 2025-04-26

## 2025-04-26 RX ORDER — PROPOFOL 10 MG/ML
INJECTION, EMULSION INTRAVENOUS CONTINUOUS PRN
Status: DISCONTINUED | OUTPATIENT
Start: 2025-04-26 | End: 2025-04-26

## 2025-04-26 RX ORDER — OXYCODONE HYDROCHLORIDE 5 MG/1
5-10 TABLET ORAL EVERY 4 HOURS PRN
Qty: 6 TABLET | Refills: 0 | Status: SHIPPED | OUTPATIENT
Start: 2025-04-26

## 2025-04-26 RX ORDER — OXYCODONE HYDROCHLORIDE 5 MG/1
5 TABLET ORAL
Status: DISCONTINUED | OUTPATIENT
Start: 2025-04-26 | End: 2025-04-27 | Stop reason: HOSPADM

## 2025-04-26 RX ORDER — ONDANSETRON 2 MG/ML
4 INJECTION INTRAMUSCULAR; INTRAVENOUS ONCE
Status: COMPLETED | OUTPATIENT
Start: 2025-04-26 | End: 2025-04-26

## 2025-04-26 RX ORDER — ACETAMINOPHEN 325 MG/1
650 TABLET ORAL
Status: DISCONTINUED | OUTPATIENT
Start: 2025-04-26 | End: 2025-04-27 | Stop reason: HOSPADM

## 2025-04-26 RX ORDER — CEFAZOLIN SODIUM/WATER 2 G/20 ML
2 SYRINGE (ML) INTRAVENOUS
Status: COMPLETED | OUTPATIENT
Start: 2025-04-26 | End: 2025-04-26

## 2025-04-26 RX ORDER — PIPERACILLIN SODIUM, TAZOBACTAM SODIUM 4; .5 G/20ML; G/20ML
4.5 INJECTION, POWDER, LYOPHILIZED, FOR SOLUTION INTRAVENOUS ONCE
Status: COMPLETED | OUTPATIENT
Start: 2025-04-26 | End: 2025-04-26

## 2025-04-26 RX ORDER — LIDOCAINE HYDROCHLORIDE 20 MG/ML
INJECTION, SOLUTION INFILTRATION; PERINEURAL PRN
Status: DISCONTINUED | OUTPATIENT
Start: 2025-04-26 | End: 2025-04-26

## 2025-04-26 RX ORDER — OXYCODONE HYDROCHLORIDE 5 MG/1
10 TABLET ORAL
Status: DISCONTINUED | OUTPATIENT
Start: 2025-04-26 | End: 2025-04-27 | Stop reason: HOSPADM

## 2025-04-26 RX ORDER — ONDANSETRON 2 MG/ML
4 INJECTION INTRAMUSCULAR; INTRAVENOUS EVERY 6 HOURS PRN
Status: DISCONTINUED | OUTPATIENT
Start: 2025-04-26 | End: 2025-04-27 | Stop reason: HOSPADM

## 2025-04-26 RX ORDER — SODIUM CHLORIDE, SODIUM LACTATE, POTASSIUM CHLORIDE, CALCIUM CHLORIDE 600; 310; 30; 20 MG/100ML; MG/100ML; MG/100ML; MG/100ML
INJECTION, SOLUTION INTRAVENOUS CONTINUOUS
Status: DISCONTINUED | OUTPATIENT
Start: 2025-04-26 | End: 2025-04-26 | Stop reason: HOSPADM

## 2025-04-26 RX ORDER — HYDROMORPHONE HYDROCHLORIDE 1 MG/ML
0.5 INJECTION, SOLUTION INTRAMUSCULAR; INTRAVENOUS; SUBCUTANEOUS
Status: DISCONTINUED | OUTPATIENT
Start: 2025-04-26 | End: 2025-04-27 | Stop reason: HOSPADM

## 2025-04-26 RX ORDER — DEXAMETHASONE SODIUM PHOSPHATE 4 MG/ML
INJECTION, SOLUTION INTRA-ARTICULAR; INTRALESIONAL; INTRAMUSCULAR; INTRAVENOUS; SOFT TISSUE PRN
Status: DISCONTINUED | OUTPATIENT
Start: 2025-04-26 | End: 2025-04-26

## 2025-04-26 RX ORDER — KETOROLAC TROMETHAMINE 15 MG/ML
15 INJECTION, SOLUTION INTRAMUSCULAR; INTRAVENOUS ONCE
Status: COMPLETED | OUTPATIENT
Start: 2025-04-26 | End: 2025-04-26

## 2025-04-26 RX ORDER — ONDANSETRON 2 MG/ML
4 INJECTION INTRAMUSCULAR; INTRAVENOUS EVERY 30 MIN PRN
Status: DISCONTINUED | OUTPATIENT
Start: 2025-04-26 | End: 2025-04-27 | Stop reason: HOSPADM

## 2025-04-26 RX ORDER — HYDROMORPHONE HCL IN WATER/PF 6 MG/30 ML
0.4 PATIENT CONTROLLED ANALGESIA SYRINGE INTRAVENOUS EVERY 5 MIN PRN
Status: DISCONTINUED | OUTPATIENT
Start: 2025-04-26 | End: 2025-04-26 | Stop reason: HOSPADM

## 2025-04-26 RX ORDER — KETOROLAC TROMETHAMINE 30 MG/ML
INJECTION, SOLUTION INTRAMUSCULAR; INTRAVENOUS PRN
Status: DISCONTINUED | OUTPATIENT
Start: 2025-04-26 | End: 2025-04-26

## 2025-04-26 RX ORDER — NALOXONE HYDROCHLORIDE 0.4 MG/ML
0.1 INJECTION, SOLUTION INTRAMUSCULAR; INTRAVENOUS; SUBCUTANEOUS
Status: DISCONTINUED | OUTPATIENT
Start: 2025-04-26 | End: 2025-04-27 | Stop reason: HOSPADM

## 2025-04-26 RX ORDER — BUPIVACAINE HYDROCHLORIDE AND EPINEPHRINE 5; 5 MG/ML; UG/ML
INJECTION, SOLUTION EPIDURAL; INTRACAUDAL; PERINEURAL PRN
Status: DISCONTINUED | OUTPATIENT
Start: 2025-04-26 | End: 2025-04-26 | Stop reason: HOSPADM

## 2025-04-26 RX ORDER — DEXAMETHASONE SODIUM PHOSPHATE 4 MG/ML
4 INJECTION, SOLUTION INTRA-ARTICULAR; INTRALESIONAL; INTRAMUSCULAR; INTRAVENOUS; SOFT TISSUE
Status: DISCONTINUED | OUTPATIENT
Start: 2025-04-26 | End: 2025-04-27 | Stop reason: HOSPADM

## 2025-04-26 RX ORDER — NALOXONE HYDROCHLORIDE 0.4 MG/ML
0.1 INJECTION, SOLUTION INTRAMUSCULAR; INTRAVENOUS; SUBCUTANEOUS
Status: DISCONTINUED | OUTPATIENT
Start: 2025-04-26 | End: 2025-04-26 | Stop reason: HOSPADM

## 2025-04-26 RX ORDER — ONDANSETRON 2 MG/ML
4 INJECTION INTRAMUSCULAR; INTRAVENOUS EVERY 30 MIN PRN
Status: DISCONTINUED | OUTPATIENT
Start: 2025-04-26 | End: 2025-04-26 | Stop reason: HOSPADM

## 2025-04-26 RX ORDER — INDOCYANINE GREEN AND WATER 25 MG
2.5 KIT INJECTION ONCE
Status: COMPLETED | OUTPATIENT
Start: 2025-04-26 | End: 2025-04-26

## 2025-04-26 RX ORDER — FENTANYL CITRATE 50 UG/ML
INJECTION, SOLUTION INTRAMUSCULAR; INTRAVENOUS PRN
Status: DISCONTINUED | OUTPATIENT
Start: 2025-04-26 | End: 2025-04-26

## 2025-04-26 RX ORDER — HYDROMORPHONE HCL IN WATER/PF 6 MG/30 ML
0.2 PATIENT CONTROLLED ANALGESIA SYRINGE INTRAVENOUS EVERY 5 MIN PRN
Status: DISCONTINUED | OUTPATIENT
Start: 2025-04-26 | End: 2025-04-26 | Stop reason: HOSPADM

## 2025-04-26 RX ORDER — ONDANSETRON 4 MG/1
4 TABLET, ORALLY DISINTEGRATING ORAL EVERY 30 MIN PRN
Status: DISCONTINUED | OUTPATIENT
Start: 2025-04-26 | End: 2025-04-26 | Stop reason: HOSPADM

## 2025-04-26 RX ORDER — ALBUTEROL SULFATE 0.83 MG/ML
2.5 SOLUTION RESPIRATORY (INHALATION) EVERY 4 HOURS PRN
Status: DISCONTINUED | OUTPATIENT
Start: 2025-04-26 | End: 2025-04-26 | Stop reason: HOSPADM

## 2025-04-26 RX ORDER — SODIUM CHLORIDE 9 MG/ML
INJECTION, SOLUTION INTRAVENOUS CONTINUOUS
Status: DISCONTINUED | OUTPATIENT
Start: 2025-04-26 | End: 2025-04-27 | Stop reason: HOSPADM

## 2025-04-26 RX ORDER — ONDANSETRON 4 MG/1
4 TABLET, ORALLY DISINTEGRATING ORAL EVERY 30 MIN PRN
Status: DISCONTINUED | OUTPATIENT
Start: 2025-04-26 | End: 2025-04-27 | Stop reason: HOSPADM

## 2025-04-26 RX ORDER — FENTANYL CITRATE 50 UG/ML
25 INJECTION, SOLUTION INTRAMUSCULAR; INTRAVENOUS EVERY 5 MIN PRN
Status: DISCONTINUED | OUTPATIENT
Start: 2025-04-26 | End: 2025-04-26 | Stop reason: HOSPADM

## 2025-04-26 RX ORDER — DEXAMETHASONE SODIUM PHOSPHATE 4 MG/ML
4 INJECTION, SOLUTION INTRA-ARTICULAR; INTRALESIONAL; INTRAMUSCULAR; INTRAVENOUS; SOFT TISSUE
Status: DISCONTINUED | OUTPATIENT
Start: 2025-04-26 | End: 2025-04-26 | Stop reason: HOSPADM

## 2025-04-26 RX ORDER — LABETALOL HYDROCHLORIDE 5 MG/ML
10 INJECTION, SOLUTION INTRAVENOUS
Status: DISCONTINUED | OUTPATIENT
Start: 2025-04-26 | End: 2025-04-26 | Stop reason: HOSPADM

## 2025-04-26 RX ORDER — FENTANYL CITRATE 50 UG/ML
50 INJECTION, SOLUTION INTRAMUSCULAR; INTRAVENOUS EVERY 5 MIN PRN
Status: DISCONTINUED | OUTPATIENT
Start: 2025-04-26 | End: 2025-04-26 | Stop reason: HOSPADM

## 2025-04-26 RX ORDER — PROPOFOL 10 MG/ML
INJECTION, EMULSION INTRAVENOUS PRN
Status: DISCONTINUED | OUTPATIENT
Start: 2025-04-26 | End: 2025-04-26

## 2025-04-26 RX ADMIN — FENTANYL CITRATE 25 MCG: 50 INJECTION, SOLUTION INTRAMUSCULAR; INTRAVENOUS at 21:02

## 2025-04-26 RX ADMIN — ONDANSETRON 4 MG: 2 INJECTION INTRAMUSCULAR; INTRAVENOUS at 19:54

## 2025-04-26 RX ADMIN — KETOROLAC TROMETHAMINE 15 MG: 15 INJECTION, SOLUTION INTRAMUSCULAR; INTRAVENOUS at 05:52

## 2025-04-26 RX ADMIN — KETOROLAC TROMETHAMINE 30 MG: 30 INJECTION, SOLUTION INTRAMUSCULAR at 19:57

## 2025-04-26 RX ADMIN — HYDROMORPHONE HYDROCHLORIDE 0.5 MG: 1 INJECTION, SOLUTION INTRAMUSCULAR; INTRAVENOUS; SUBCUTANEOUS at 18:47

## 2025-04-26 RX ADMIN — NOREPINEPHRINE BITARTRATE 6.4 MCG: 1 INJECTION, SOLUTION, CONCENTRATE INTRAVENOUS at 18:33

## 2025-04-26 RX ADMIN — HYDROMORPHONE HYDROCHLORIDE 0.5 MG: 1 INJECTION, SOLUTION INTRAMUSCULAR; INTRAVENOUS; SUBCUTANEOUS at 18:51

## 2025-04-26 RX ADMIN — LIDOCAINE HYDROCHLORIDE 50 MG: 20 INJECTION, SOLUTION INFILTRATION; PERINEURAL at 18:33

## 2025-04-26 RX ADMIN — SODIUM CHLORIDE, SODIUM LACTATE, POTASSIUM CHLORIDE, AND CALCIUM CHLORIDE: .6; .31; .03; .02 INJECTION, SOLUTION INTRAVENOUS at 18:30

## 2025-04-26 RX ADMIN — SODIUM CHLORIDE, SODIUM LACTATE, POTASSIUM CHLORIDE, AND CALCIUM CHLORIDE: .6; .31; .03; .02 INJECTION, SOLUTION INTRAVENOUS at 20:01

## 2025-04-26 RX ADMIN — INDOCYANINE GREEN AND WATER 2.5 MG: KIT at 17:33

## 2025-04-26 RX ADMIN — FENTANYL CITRATE 100 MCG: 50 INJECTION INTRAMUSCULAR; INTRAVENOUS at 18:33

## 2025-04-26 RX ADMIN — ONDANSETRON 4 MG: 2 INJECTION INTRAMUSCULAR; INTRAVENOUS at 15:24

## 2025-04-26 RX ADMIN — ROCURONIUM BROMIDE 50 MG: 50 INJECTION, SOLUTION INTRAVENOUS at 18:33

## 2025-04-26 RX ADMIN — ONDANSETRON 4 MG: 2 INJECTION, SOLUTION INTRAMUSCULAR; INTRAVENOUS at 05:52

## 2025-04-26 RX ADMIN — MIDAZOLAM 2 MG: 1 INJECTION INTRAMUSCULAR; INTRAVENOUS at 18:30

## 2025-04-26 RX ADMIN — PROPOFOL 150 MG: 10 INJECTION, EMULSION INTRAVENOUS at 18:33

## 2025-04-26 RX ADMIN — FENTANYL CITRATE 50 MCG: 50 INJECTION, SOLUTION INTRAMUSCULAR; INTRAVENOUS at 20:45

## 2025-04-26 RX ADMIN — PROPOFOL 50 MG: 10 INJECTION, EMULSION INTRAVENOUS at 20:03

## 2025-04-26 RX ADMIN — PIPERACILLIN AND TAZOBACTAM 4.5 G: 4; .5 INJECTION, POWDER, FOR SOLUTION INTRAVENOUS at 07:55

## 2025-04-26 RX ADMIN — SUGAMMADEX 200 MG: 100 INJECTION, SOLUTION INTRAVENOUS at 19:54

## 2025-04-26 RX ADMIN — PROPOFOL 150 MCG/KG/MIN: 10 INJECTION, EMULSION INTRAVENOUS at 18:33

## 2025-04-26 RX ADMIN — Medication 2 G: at 18:20

## 2025-04-26 RX ADMIN — SODIUM CHLORIDE: 0.9 INJECTION, SOLUTION INTRAVENOUS at 10:19

## 2025-04-26 RX ADMIN — DEXAMETHASONE SODIUM PHOSPHATE 8 MG: 4 INJECTION, SOLUTION INTRA-ARTICULAR; INTRALESIONAL; INTRAMUSCULAR; INTRAVENOUS; SOFT TISSUE at 18:33

## 2025-04-26 RX ADMIN — FENTANYL CITRATE 50 MCG: 50 INJECTION, SOLUTION INTRAMUSCULAR; INTRAVENOUS at 20:36

## 2025-04-26 RX ADMIN — ACETAMINOPHINE, CAFFEINE 2 TABLET: 500; 65 TABLET, FILM COATED ORAL at 12:03

## 2025-04-26 ASSESSMENT — ACTIVITIES OF DAILY LIVING (ADL)
ADLS_ACUITY_SCORE: 27
ADLS_ACUITY_SCORE: 28
ADLS_ACUITY_SCORE: 27
ADLS_ACUITY_SCORE: 22
ADLS_ACUITY_SCORE: 45
ADLS_ACUITY_SCORE: 27
ADLS_ACUITY_SCORE: 45
ADLS_ACUITY_SCORE: 22
ADLS_ACUITY_SCORE: 45
ADLS_ACUITY_SCORE: 45

## 2025-04-26 ASSESSMENT — COLUMBIA-SUICIDE SEVERITY RATING SCALE - C-SSRS
6. HAVE YOU EVER DONE ANYTHING, STARTED TO DO ANYTHING, OR PREPARED TO DO ANYTHING TO END YOUR LIFE?: NO
2. HAVE YOU ACTUALLY HAD ANY THOUGHTS OF KILLING YOURSELF IN THE PAST MONTH?: NO
1. IN THE PAST MONTH, HAVE YOU WISHED YOU WERE DEAD OR WISHED YOU COULD GO TO SLEEP AND NOT WAKE UP?: NO

## 2025-04-26 NOTE — PLAN OF CARE
PRIMARY DIAGNOSIS: ACUTE RENAL COLIC    OUTPATIENT/OBSERVATION GOALS TO BE MET BEFORE DISCHARGE  1. Pain Status: Improved with use of alternative comfort measures i.e.: positioning and distraction using     2. Tolerating adequate PO diet:  na    3. Surgical Intervention planned: Yes    4. Cleared by consultants (if involved): N/A    5. Return to near baseline physical activity: Yes    Discharge Planner Nurse   Safe discharge environment identified: Yes  Barriers to discharge: No.        Entered by: Frank Randolph RN 04/26/2025 5:09 PM     Please review provider order for any additional goals.   Nurse to notify provider when observation goals have been met and patient is ready for discharge.Goal Outcome Evaluation:

## 2025-04-26 NOTE — LETTER
To whom it may concern,    Bella Scott underwent a medical procedure 4/26. She will be able to return to work 5/5/25.    Thank you,    Viridiana Goss MD

## 2025-04-26 NOTE — PLAN OF CARE
ROOM # 212-1    Living Situation (if not independent, order SW consult):  Facility name:  : [facility]     Activity level at baseline: independent   Activity level on admit: independent     Who will be transporting you at discharge: [facility]     Patient registered to observation; given Patient Bill of Rights; given the opportunity to ask questions about observation status and their plan of care.  Patient has been oriented to the observation room, bathroom and call light is in place.    Discussed discharge goals and expectations with patient/family.

## 2025-04-26 NOTE — ANESTHESIA PREPROCEDURE EVALUATION
Anesthesia Pre-Procedure Evaluation    Patient: Bella Scott   MRN: [id number] : 1985        Procedure : Procedure(s):  CHOLECYSTECTOMY, ROBOT-ASSISTED, LAPAROSCOPIC, USING DA SERINA XI          Past Medical History:   Diagnosis Date    Anemia, unspecified 3/24/2003    iron deficiney per pt    Contraception     Unspecified hypothyroidism       Past Surgical History:   Procedure Laterality Date     SECTION  2014    Procedure:  SECTION;;  Surgeon: Lisa Steele MD;  Location: [facility]     SECTION N/A 3/14/2017    Procedure:  SECTION;  Surgeon: Lucie Jackson MD;  Location: [facility]    HC EXC BENIGN SKIN LESION SCLP/NCK/HNDS/FEET/GEN 0.6-1.0 CM  08    RT Thumb    HC REPAIR OF NAIL BED  08    RT Thumb-benign      Allergies   Allergen Reactions    No Known Allergies       Social History     Tobacco Use    Smoking status: Never     Passive exposure: Never    Smokeless tobacco: Never    Tobacco comments:     no 2nd hand smoke at home   Substance Use Topics    Alcohol use: Yes     Alcohol/week: 0.0 standard drinks of alcohol     Comment: once a month      Wt Readings from Last 1 Encounters:   25 93.5 kg (206 lb 3.2 oz)        Anesthesia Evaluation            ROS/MED HX  ENT/Pulmonary:  - neg pulmonary ROS     Neurologic:       Cardiovascular:  - neg cardiovascular ROS     METS/Exercise Tolerance:     Hematologic:       Musculoskeletal:       GI/Hepatic:     (+)          cholecystitis/cholelithiasis,          Renal/Genitourinary:       Endo:     (+)          thyroid problem,     Obesity (on phentermine and zepbound),       Psychiatric/Substance Use:     (+) psychiatric history depression       Infectious Disease:       Malignancy:       Other:            Physical Exam    Airway        Mallampati: II   TM distance: > 3 FB   Neck ROM: full   Mouth opening: > 3 cm    Respiratory Devices and Support         Dental           Cardiovascular   cardiovascular exam normal       "    Pulmonary   pulmonary exam normal                OUTSIDE LABS:  CBC:   Lab Results   Component Value Date    WBC 10.0 04/26/2025    WBC 5.8 09/25/2023    HGB 12.9 04/26/2025    HGB 13.1 09/25/2023    HCT 38.8 04/26/2025    HCT 37.0 09/25/2023     04/26/2025     09/25/2023     BMP:   Lab Results   Component Value Date     04/26/2025     08/08/2024    POTASSIUM 3.9 04/26/2025    POTASSIUM 4.2 08/08/2024    CHLORIDE 103 04/26/2025    CHLORIDE 107 08/08/2024    CO2 26 04/26/2025    CO2 23 08/08/2024    BUN 17.4 04/26/2025    BUN 10.1 08/08/2024    CR 0.92 04/26/2025    CR 0.76 08/08/2024     (H) 04/26/2025    GLC 89 08/08/2024     COAGS: No results found for: \"PTT\", \"INR\", \"FIBR\"  POC:   Lab Results   Component Value Date    HCG Negative 01/15/2022    HCGS Negative 04/26/2025     HEPATIC:   Lab Results   Component Value Date    ALBUMIN 4.9 04/26/2025    PROTTOTAL 7.5 04/26/2025     (H) 04/26/2025     (H) 04/26/2025    ALKPHOS 113 04/26/2025    BILITOTAL 0.7 04/26/2025     OTHER:   Lab Results   Component Value Date    PH 5.5 01/09/2004    A1C 5.1 03/04/2024    ESTEFANÍA 9.5 04/26/2025    LIPASE 30 04/26/2025    TSH 0.31 08/08/2024    T4 1.14 08/08/2024    T3 117 08/08/2024    SED 10 10/18/2023       Anesthesia Plan    ASA Status:  2    NPO Status:  NPO Appropriate    Anesthesia Type: General.     - Airway: ETT   Induction: Intravenous.   Maintenance: Balanced.        Consents    Anesthesia Plan(s) and associated risks, benefits, and realistic alternatives discussed. Questions answered and patient/representative(s) expressed understanding.     - Discussed:     - Discussed with:  Patient            Postoperative Care    Pain management: IV analgesics, Oral pain medications, Multi-modal analgesia.   PONV prophylaxis: Ondansetron (or other 5HT-3), Dexamethasone or Solumedrol     Comments:               Loan Sood MD    Clinically Significant Risk Factors Present on Admission " "                            # Obesity: Estimated body mass index is 31.35 kg/m  as calculated from the following:    Height as of this encounter: 1.727 m (5' 8\").    Weight as of this encounter: 93.5 kg (206 lb 3.2 oz).                "

## 2025-04-26 NOTE — PLAN OF CARE
Transferred to pre-op belongings sent along,  on his way. Pt did not have any questions or concerns noted upon departure.

## 2025-04-26 NOTE — ED PROVIDER NOTES
"  Emergency Department Note      History of Present Illness     Chief Complaint   Epigastric Pain    HPI   Bella Scott is a 39 year old female who presents to the ED with her  for evaluation of epigastric pain. The patient reports that last night before she went to bed she had right upper quadrant pain. At around 0315, she was woken up from sleep due to epigastric pain that radiated across her upper back. She describes it as feeling like everything tightened up. About an hour later, she became nauseous and diaphoretic. Also endorses difficulty taking a deep breathing when her pain occurred. Denies vomiting, fever, or chills. She's had this pain 4 times in the past, however these all resolved 45 minutes to an hour after onset. Patient states that 5 minutes before my initial evaluation, her pain dissipated. Denies history of abdominal surgeries or gallbladder issues.     Independent Historian   None    Review of External Notes       Past Medical History     Medical History and Problem List   Anemia  Depression   Hypercholesteremia   Hypothyroidism  Multinodular goiter  Obesity   Ovarian cyst  Somatic dysfunction of lumbar and sacral region     Medications   Adipex   Effexor   Glucophage   Prilosec  Synthroid   Zepbound    Surgical History    section x2  Excise benign skin lesion right thumb  Repair nail bed, right thumb    Physical Exam     Patient Vitals for the past 24 hrs:   BP Temp Temp src Pulse Resp SpO2 Height Weight   25 0544 123/78 97  F (36.1  C) Temporal 77 18 100 % 1.727 m (5' 8\") 94.2 kg (207 lb 10.8 oz)     Physical Exam  Constitutional: Well appearing.  HEENT: Atraumatic.   Moist mucous membranes.  Neck: Soft.  Supple.    Cardiac: Regular rate and rhythm.  No murmur or rub.  Respiratory: Clear to auscultation bilaterally.  No respiratory distress.   Abdomen: Soft and nontender.  No  guarding.  Nondistended.  Musculoskeletal: No edema.  Normal range of motion.  Neurologic: Alert " and oriented x3.  Normal tone and bulk.    Skin: No rashes.  No edema.  Psych: Normal affect.  Normal behavior.            Diagnostics     Lab Results   Labs Ordered and Resulted from Time of ED Arrival to Time of ED Departure   COMPREHENSIVE METABOLIC PANEL - Abnormal       Result Value    Sodium 141      Potassium 3.9      Carbon Dioxide (CO2) 26      Anion Gap 12      Urea Nitrogen 17.4      Creatinine 0.92      GFR Estimate 80      Calcium 9.5      Chloride 103      Glucose 124 (*)     Alkaline Phosphatase 113       (*)      (*)     Protein Total 7.5      Albumin 4.9      Bilirubin Total 0.7     ROUTINE UA WITH MICROSCOPIC REFLEX TO CULTURE - Abnormal    Color Urine Yellow      Appearance Urine Clear      Glucose Urine Negative      Bilirubin Urine Small (*)     Ketones Urine Negative      Specific Gravity Urine 1.020      Blood Urine Negative      pH Urine 7.0      Protein Albumin Urine 50 (*)     Urobilinogen Urine 12.0 (*)     Nitrite Urine Negative      Leukocyte Esterase Urine Negative      Mucus Urine Present (*)     RBC Urine 7 (*)     WBC Urine 3      Squamous Epithelials Urine <1     LIPASE - Normal    Lipase 30     TROPONIN T, HIGH SENSITIVITY - Normal    Troponin T, High Sensitivity 6     HCG QUALITATIVE PREGNANCY - Normal    hCG Serum Qualitative Negative     CBC WITH PLATELETS AND DIFFERENTIAL    WBC Count 10.0      RBC Count 4.46      Hemoglobin 12.9      Hematocrit 38.8      MCV 87      MCH 28.9      MCHC 33.2      RDW 12.2      Platelet Count 300      % Neutrophils 67      % Lymphocytes 24      % Monocytes 7      % Eosinophils 1      % Basophils 1      % Immature Granulocytes 1      NRBCs per 100 WBC 0      Absolute Neutrophils 6.7      Absolute Lymphocytes 2.4      Absolute Monocytes 0.7      Absolute Eosinophils 0.1      Absolute Basophils 0.1      Absolute Immature Granulocytes 0.1      Absolute NRBCs 0.0       Imaging   US Abdomen Limited   Final Result   IMPRESSION:   1.   Cholelithiasis with equivocal findings for cholecystitis; while the gallbladder is mildly distended with top limits of normal gallbladder wall thickness, there is no sonographic Massey sign. If further more definitive evaluation is necessary,    consider nuclear medicine HIDA scan.        EKG   ECG taken at 0540, ECG read at 0600  Normal sinus rhythm    No significant change as compared to prior, dated 3/11/11.  Rate 64 bpm. MO interval 126 ms. QRS duration 80 ms. QT/QTc 432/445 ms. P-R-T axes 5 43 46.    Independent Interpretation   None    ED Course      Medications Administered   Medications   piperacillin-tazobactam (ZOSYN) 4.5 g vial to attach to  mL bag (4.5 g Intravenous $New Bag 4/26/25 9753)   HYDROmorphone (PF) (DILAUDID) injection 0.5 mg (has no administration in time range)   ondansetron (ZOFRAN) injection 4 mg (4 mg Intravenous $Given 4/26/25 0552)   ketorolac (TORADOL) injection 15 mg (15 mg Intravenous $Given 4/26/25 0552)     Procedures   Procedures     Discussion of Management   Surgery, Dr. Goss    ED Course   ED Course as of 04/26/25 0813   Sat Apr 26, 2025   0614 I obtained history and examined the patient as noted above.    0737 I consulted with Dr. Goss from general surgery. Discussed patient's presentation, findings, and plan of care.      Additional Documentation  None    Medical Decision Making / Diagnosis     CMS Diagnoses: None    MIPS       None    MDM   Bella Scott is a 39 year old female who is afebrile and hemodynamically stable.  Her pain is significantly improved since he got here.  Lab workup was notable for elevated liver enzymes with AST in the 200s and ALT in the 100s and normal bilirubin.  Open quadrant ultrasound was equivocal for cholecystitis with distention of the gallbladder and gallbladder wall thickening and Poornima lithiasis.  I discussed the findings with her.  Interestingly, her pain is relieved, however, she certainly has abnormalities on her blood work and her  right upper quadrant ultrasound is concerning.  Discussed with general surgery who recommends and offers cholecystectomy today.  Patient in agreement that discussion the options.  She was given IV Zosyn.  We discussed plan for transfer to the OR and she is in stable condition at time of transfer to the OR.  Her questions were answered.    Disposition   The patient was sent to the OR.    Diagnosis     ICD-10-CM    1. Acute cholecystitis  K81.0 Case Request: CHOLECYSTECTOMY, ROBOT-ASSISTED, LAPAROSCOPIC, USING DA SERINA XI     Case Request: CHOLECYSTECTOMY, ROBOT-ASSISTED, LAPAROSCOPIC, USING DA SERINA XI         Scribe Disclosure:  BERYL LOUIS, am serving as a scribe at 6:09 AM on 4/26/2025 to document services personally performed by Torey Sanderson MD based on my observations and the provider's statements to me.      Torey Sanderson MD  04/26/25 0853

## 2025-04-26 NOTE — ANESTHESIA PROCEDURE NOTES
Airway       Patient location during procedure: OR       Procedure Start/Stop Times: 4/26/2025 6:37 PM  Staff -        CRNA: Jai Jay APRN CRNA       Performed By: CRNAIndications and Patient Condition       Indications for airway management: xander-procedural and airway protection       Induction type:intravenous       Mask difficulty assessment: 1 - vent by mask    Final Airway Details       Final airway type: endotracheal airway       Successful airway: ETT - single  Endotracheal Airway Details        ETT size (mm): 7.0       Cuffed: yes       Successful intubation technique: direct laryngoscopy       DL Blade Type: MAC 3       Grade View of Cords: 1       Adjucts: stylet       Position: Right       Measured from: gums/teeth       Secured at (cm): 23       Bite block used: None    Post intubation assessment        Placement verified by: capnometry, equal breath sounds and chest rise        Number of attempts at approach: 1       Secured with: tape       Ease of procedure: easy       Dentition: Intact    Medication(s) Administered   Medication Administration Time: 4/26/2025 6:37 PM

## 2025-04-26 NOTE — CONSULTS
General Surgery Consultation    Bella Scott MRN# [id number]   Age: 39 year old YOB: 1985     Date of Admission:  4/26/2025    Reason for consult:            Abdominal pain, right upper quadrant       Requesting physician:            Maria E                Assessment and Plan:   Assessment:   Bella Scott is a 39 year old female with acute cholecystitis. She has had previous episodes of pain and the last episode lasted 3 hours and she has gallbladder thickening on imaging and stones    Comorbidities:   has a past medical history of Anemia, unspecified (3/24/2003), Contraception, and Unspecified hypothyroidism.      Plan:   I have offered the patient a robotic cholecystectomy     We have discussed the indication, alternatives, risks and expected recovery.  Specifically we have discussed incisions, scarring, postoperative infections, anesthesia, bleeding, blood transfusion, open conversion, common bile duct injury, injury to intra-abdominal organs, adhesions leading to bowel obstruction, retained common bile duct stone, bile leak, DVT, PE, hernia, post cholecystectomy diarrhea, recovery, postoperative dietary restrictions and physical limitations.  We have discussed the recommended interventions and treatments for these complications.  All questions have been answered to the best of my ability.    She elects to proceed with surgery.                  Chief Complaint:   Abdominal pain, epigastric     History is obtained from the patient.         History of Present Illness:   Bella Scott is a 39 year old  female who presents with right upper quadrant abdominal pain for the past 1 day.  The pain is intermittent.  She has had similar pain in the past for several months intermittently lasting 45 minutes .  There is an association with eating fatty foods.  Positive for associated symptoms of nausea.  She  does not have a history of jaundice or dark urine.  She  has not had pancreatitis in the past.               Past Medical History:    has a past medical history of Anemia, unspecified (3/24/2003), Contraception, and Unspecified hypothyroidism.          Past Surgical History:     Past Surgical History:   Procedure Laterality Date     SECTION  2014    Procedure:  SECTION;;  Surgeon: Lisa Steele MD;  Location: [facility]     SECTION N/A 3/14/2017    Procedure:  SECTION;  Surgeon: Lucie Jackson MD;  Location: [facility]    HC EXC BENIGN SKIN LESION SCLP/NCK/HNDS/FEET/GEN 0.6-1.0 CM  08    RT Thumb    HC REPAIR OF NAIL BED  08    RT Thumb-benign             Social History:     Social History     Tobacco Use    Smoking status: Never     Passive exposure: Never    Smokeless tobacco: Never    Tobacco comments:     no 2nd hand smoke at home   Substance Use Topics    Alcohol use: Yes     Alcohol/week: 0.0 standard drinks of alcohol     Comment: once a month             Family History:   Family history reviewed and is not pertinent.         Allergies:     Allergies   Allergen Reactions    No Known Allergies              Medications:     Current Facility-Administered Medications   Medication Dose Route Frequency Provider Last Rate Last Admin    acetaminophen-caffeine (EXCEDRIN TENSION HEADACHE) 500-65 MG tablet 2 tablet  2 tablet Oral Q6H PRN Viridiana Goss MD        ceFAZolin (ANCEF) 2 g in dextrose 50 mL intermittent infusion  2 g Intravenous Pre-Op/Pre-procedure x 1 dose Viridiana Goss MD        ceFAZolin (ANCEF) 2 g in dextrose 50 mL intermittent infusion  2 g Intravenous See Admin Instructions Viridiana Goss MD        HYDROmorphone (PF) (DILAUDID) injection 0.5 mg  0.5 mg Intravenous Q1H PRN Torey Sanderson MD        indocyanine green (IC-GREEN) injection 2.5 mg  2.5 mg Intravenous Once Viridiana Goss MD        sodium chloride 0.9 % infusion   Intravenous Continuous Torey Sanderson  mL/hr at 25 1111 Rate Verify at 25 1111     Current  "Facility-Administered Medications   Medication Dose Route Frequency Provider Last Rate Last Admin    ceFAZolin (ANCEF) 2 g in dextrose 50 mL intermittent infusion  2 g Intravenous Pre-Op/Pre-procedure x 1 dose Viridiana Goss MD        ceFAZolin (ANCEF) 2 g in dextrose 50 mL intermittent infusion  2 g Intravenous See Admin Instructions Viridiana Goss MD        indocyanine green (IC-GREEN) injection 2.5 mg  2.5 mg Intravenous Once Viridiana Goss MD                Review of Systems:   The 10 point review of systems is negative other than noted in the HPI.          Physical Exam:   /74 (BP Location: Right arm)   Pulse 74   Temp 98  F (36.7  C) (Oral)   Resp 16   Ht 1.727 m (5' 8\")   Wt 93.5 kg (206 lb 3.2 oz)   LMP 04/05/2025 (Approximate)   SpO2 99%   BMI 31.35 kg/m    General - Well developed, well nourished female in no apparent distress  HEENT:  Head normocephalic and atraumatic, pupils equal and round, conjunctivae clear, no scleral icterus, mucous membranes moist, external ears and nose normal  Neck: Supple without thyromegaly or masses  Lymphatic: No cervical, or supraclavicular lymphadenopathy  Lungs: Clear to auscultation bilaterally  Heart: regular rate and rhythm, no murmurs  Abdomen:   soft, rounded, non-distended with no tenderness noted  no masses palpated  Extremities: Warm without edema  Neurologic: nonfocal  Psychiatric: Mood and affect appropriate  Skin: Without lesions, rashes, or juandice         Data:     WBC -   Lab Results   Component Value Date    WBC 10.0 04/26/2025       HgB -   Lab Results   Component Value Date    HGB 12.9 04/26/2025       Plt-   Lab Results   Component Value Date     04/26/2025       Liver Function Studies -   Recent Labs   Lab Test 04/26/25  0549   PROTTOTAL 7.5   ALBUMIN 4.9   BILITOTAL 0.7   ALKPHOS 113   *   *       Lipase-   Lab Results   Component Value Date    LIPASE 30 04/26/2025         Imaging:  All imaging studies " reviewed by me.    Results for orders placed or performed during the hospital encounter of 04/26/25   US Abdomen Limited    Narrative    EXAM: US ABDOMEN LIMITED  LOCATION: [facility]  DATE: 4/26/2025    INDICATION: epigastric RUQ pain  COMPARISON: None.  TECHNIQUE: Limited abdominal ultrasound.    FINDINGS:    GALLBLADDER: Cholelithiasis in a mildly distended gallbladder with top limits of normal gallbladder wall thickness. No sonographic Massey sign or pericholecystic fluid.    BILE DUCTS: No biliary dilatation. The common duct measures 3 mm.    LIVER: Normal parenchyma with smooth contour. No focal mass. The portal vein is patent with flow in the normal direction.    RIGHT KIDNEY: No hydronephrosis.    PANCREAS: The visualized portions are normal.    No ascites.      Impression    IMPRESSION:  1.  Cholelithiasis with equivocal findings for cholecystitis; while the gallbladder is mildly distended with top limits of normal gallbladder wall thickness, there is no sonographic Massey sign. If further more definitive evaluation is necessary,   consider nuclear medicine HIDA scan.       This note was created using voice recognition software. Undetected word substitutions or other errors may have occurred.     Time spent with the patient, reviewing the EMR, reviewing laboratory and imaging studies, more than 50% of which was counseling and coordinating care:  30 minutes.     Viridiana Goss MD

## 2025-04-27 NOTE — OP NOTE
[facility] General Surgery Operative Note    Pre-operative diagnosis: acute cholecystitis   Post-operative diagnosis: Acute on chronic cholecystitis   Procedure: Robotic cholecystectomy    Surgeon: Viridiana Goss MD   Assistant(s): Jasmine Kwan PA-C  The Physician Assistant was medically necessary for their expertise in prepping, robotic instrument exchange, passing of material through port sites, closure of port sites, suturing and retraction.   Anesthesia: general   Estimated blood loss:  Specimen: 20 cc  gallbladder and contents               INDICATION FOR OPERATION: This is a 39 year old female who presented to the ED with abdominal pain. Studies including ultrasound were consistent with acute cholecystitis. We discussed robotic assisted laparoscopic cholecystectomy and the patient agreed to proceed after hearing the risks and benefits.    DESCRIPTION OF PROCEDURE:  The patient was taken to the operating room and placed on the table in supine position.  General endotracheal anesthesia was induced and the abdomen was prepped and draped in standard sterile fashion.    Access was gained in the left upper quadrant at [place] point with a 5mm 0 degree laparscopic with a visiport trocar under direct visualization.   The abdomen was insufflated with CO2.  Additional 8mm robotic ports were placed in an oblique line from right lower quadrant with even spacing to the left upper quadrant port which was replaced with an 8mm robotic port. The patient was placed in reverse Trendelenburg and right side up.  The robot was then docked.    The fundus of the gallbladder was grasped and retracted cephalad with a prograsp. There was a very dense rind and omental adhesions taken down with cautery. The infundibulum was grasped and retracted laterally.  The peritoneum over the medial and lateral aspects of the triangle of Calot was taken down with blunt dissection and cautery.  The cystic duct and artery were freed up  from surrounding tissues.  The triangle of Calot was skeletonized revealing the critical view of safety.  Intraoperative fluorescence was used to evaluate the biliary anatomy with no additional biliary structures lighting up other than the cystic duct and common bile duct     The duct was clipped twice proximally, once distally, and the cystic artery cauterized with bipolar distally then clipped once proximally, then both were transected. An additional pulsating vessel in the rind was also clipped. The gallbladder was then removed from the liver using the cautery.  The gallbladder was passed into an Endocatch bag in the left mid abdomen port site. We observed the right upper quadrant carefully for hemostasis.  Hemostasis was assured.      The endocatch bag was removed from the abdomen, The endocatch bag was removed from the abdomen. The muscle and peritoneum of the port was closed with a running 3-0 Stratafix.    All of the incisions were closed with interrupted 4-0 Vicryl subcuticular sutures and Dermabond.  The patient tolerated the procedure well.  Sponge and instrument counts were correct.      FINDINGS Chronically inflamed gallbladder with very thick capsule .     Viridiana Goss MD

## 2025-04-27 NOTE — ANESTHESIA CARE TRANSFER NOTE
Patient: Bella Scott    Procedure: Procedure(s):  CHOLECYSTECTOMY, ROBOT-ASSISTED, LAPAROSCOPIC, USING DA SERINA XI       Diagnosis: Acute cholecystitis [K81.0]  Diagnosis Additional Information: No value filed.    Anesthesia Type:   General     Note:    Oropharynx: oral airway in place and spontaneously breathing  Level of Consciousness: drowsy  Oxygen Supplementation: face mask  Level of Supplemental Oxygen (L/min / FiO2): 10  Independent Airway: airway patency satisfactory and stable  Dentition: dentition unchanged  Vital Signs Stable: post-procedure vital signs reviewed and stable  Report to RN Given: handoff report given  Patient transferred to: PACU    Handoff Report: Identifed the Patient, Identified the Reponsible Provider, Reviewed the pertinent medical history, Discussed the surgical course, Reviewed Intra-OP anesthesia mangement and issues during anesthesia, Set expectations for post-procedure period and Allowed opportunity for questions and acknowledgement of understanding      Vitals:  Vitals Value Taken Time   BP     Temp     Pulse     Resp     SpO2         Electronically Signed By: CECILIA Torres CRNA  April 26, 2025  8:18 PM

## 2025-04-27 NOTE — DISCHARGE INSTRUCTIONS
HOME CARE FOLLOWING LAPAROSCOPIC CHOLECYSTECTOMY  CHENCHO Wagoner, LEAH Kumar, FELTON Cristobal    INCISIONAL CARE:  Replace the bandage over your incisions DAILY until all drainage stops, or if more comfortable to have in place.  If present, leave the steri-strips (white paper tapes) in place for 14 days after surgery.  If Dermabond (a type of skin glue) is present, leave in place until it wears/flakes off (2-3 weeks).     BATHING:  OK to shower 48 hours after surgery.  Avoid baths for 1 week after surgery.  You may wash your hair at any time.  Gently pat your incision dry after bathing.  Do not apply lotions, creams, or ointments to incisions.    ACTIVITY:  Light Activity -- you may immediately be up and about as tolerated.  Walking is encouraged, increase as tolerated.  Driving/Light Work-- when comfortable and off narcotic pain medications.  Strenuous Work/Activity -- limit lifting to 20 pounds for 2 weeks.  Progressively increase with time.  Active Sports (running, biking, etc.) -- cautiously resume after 2 weeks.    DISCOMFORT:  Local anesthetic placed at surgery should provide relief for 4-8 hours.  Begin taking pain pills before discomfort is severe.  Take the pain medication with some food, when possible, to minimize side effects.  Intermittent use of ice packs may help during the first 1-3 weeks after surgery.  Expect gradual improvement.    Over-the-counter anti-inflammatory medications (i.e. Ibuprofen/Advil/Motrin or Naprosyn/Aleve) may be used per package instructions in addition to or while tapering off the narcotic pain medications to decrease swelling and sensitivity.  DO NOT TAKE these Anti-inflammatory medications if your primary physician has advised against doing so, or if you have acid reflux, ulcer, or bleeding disorder, or take blood-thinner medications.  Call your primary physician or the surgery office if you have medication questions.    After laparoscopic  cholecystectomy, you may have shoulder or upper back discomfort due to the gas used during surgery.  This is temporary and should resolve within 2-3 days.  Frequent short walks may help with this.  You may have decreased energy level for 1-2 weeks after surgery related to your recovery.    DIET:  Start with liquids and gradually increase diet as tolerated.  Drink plenty of fluids.  While taking pain medications, consider use of a stool softener, increase your fiber in your diet, or add a fiber supplement (like Metamucil, Citrucel) to help prevent constipation - a possible side effect of pain medications.  It is not uncommon to experience some bowel changes (loose stools or constipation) after surgery.  Your body has to adapt to you no longer having a gall bladder.  To help minimize this side effect, avoid fatty foods for 1-2 weeks after surgery.  You may then slowly increase the amount of fatty foods in your diet.      NAUSEA:  If nauseated from the anesthetic/pain meds; rest in bed, get up cautiously with assistance, and drink clear liquids (juice, tea, broth).    FOLLOW-UP AFTER SURGERY:  -Our office will contact you approximately 2-3 weeks after surgery to check on your progress and answer any questions you may have.  If you are doing well, you will not need to return for an office appointment.  If any concerns are identified over the phone, we will help you make an appointment to see a provider.    -If you have not received a phone call, have any questions or concerns, or would like to be seen, please call us at [phone].  We are located at: [address], [address]; [place] [address]    -CONTACT US IF THE FOLLOWING DEVELOPS:   1. A fever that is above 101     2. Increased redness, warmth, drainage, bleeding, or swelling.   3. Pain that is not relieved by rest/ice and your prescription.   4.  Increasing pain after 48 hours.   5. Drainage that is thick, cloudy, yellow, green or white.   6. Any other  questions or concerns.      FREQUENTLY ASKED QUESTIONS:    Q:  How should my incision look?    A:  Normally your incision will appear slightly swollen with light redness directly along the incision itself as it heals.  It may feel like a bump or ridge as the healing/scarring happens, and over time (3-4 months) this bump or ridge feeling should slowly go away.  In general, clear or pink watery drainage can be normal at first as your incision heals, but should decrease over time.    Q:  How do I know if my incision is infected?  A:  Look at your incision for signs of infection, like redness around the incision spreading to surrounding skin, or drainage of cloudy or foul-smelling drainage.  If you feel warm, check your temperature to see if you are running a fever.    **If any of these things occur, please notify the nurse at our office.  We may need you to come into the office for an incision check.      Q:  How do I take care of my incision?  A:  If you have a dressing in place - Starting the day after surgery, replace the dressing 1-2 times a day until there is no further drainage from the incision.  At that time, a dressing is no longer needed.  Try to minimize tape on the skin if irritation is occurring at the tape sites.  If you have significant irritation from tape on the skin, please call the office to discuss other method of dressing your incision.    Small pieces of tape called  steri-strips  may be present directly overlying your incision; these may be removed 10 days after surgery unless otherwise specified by your surgeon.  If these tapes start to loosen at the ends, you may trim them back until they fall off or are removed.    A:  If you had  Dermabond  tissue glue used as a dressing (this causes your incision to look shiny with a clear covering over it) - This type of dressing wears off with time and does not require more dressings over the top unless it is draining around the glue as it wears off.  Do  not apply ointments or lotions over the incisions until the glue has completely worn off.    Q:  There is a piece of tape or a sticky  lead  still on my skin.  Can I remove this?  A:  Sometimes the sticky  leads  used for monitoring during surgery or for evaluation in the emergency department are not all removed while you are in the hospital.  These sometimes have a tab or metal dot on them.  You can easily remove these on your own, like taking off a band-aid.  If there is a gel substance under the  lead , simply wipe/clean it off with a washcloth or paper towel.      Q:  What can I do to minimize constipation (very hard stools, or lack of stools)?  A:  Stay well hydrated.  Increase your dietary fiber intake or take a fiber supplement -with plenty of water.  Walk around frequently.  You may consider an over-the-counter stool-softener.  Your Pharmacist can assist you with choosing one that is stocked at your pharmacy.  Constipation is also one of the most common side effects of pain medication.  If you are using pain medication, be pro-active and try to PREVENT problems with constipation by taking the steps above BEFORE constipation becomes a problem.    Q:  What do I do if I need more pain medications?  A:  Call the office to receive refills.  Be aware that certain pain meds cannot be called into a pharmacy and actually require a paper prescription.  A change may be made in your pain med as you progress thru your recovery period or if you have side effects to certain meds.    --Pain meds are NOT refilled after 5pm on weekdays, and NOT AT ALL on the weekends, so please look ahead to prevent problems.      Q:  Why am I having a hard time sleeping now that I am at home?  A:  Many medications you receive while you are in the hospital can impact your sleep for a number of days after your surgery/hospitalization.  Decreased level of activity and naps during the day may also make sleeping at night difficult.  Try to  minimize day-time naps, and get up frequently during the day to walk around your home during your recovery time.  Sleep aides may be of some help, but are not recommended for long-term use.      Q:  I am having some back discomfort.  What should I do?  A:  This may be related to certain positioning that was required for your surgery, extended periods of time in bed, or other changes in your overall activity level.  You may try ice, heat, acetaminophen, or ibuprofen to treat this temporarily.  Note that many pain medications have acetaminophen in them and would state this on the prescription bottle.  Be sure not to exceed the maximum of 4000mg per day of acetaminophen.     **If the pain you are having does not resolve, is severe, or is a flare of back pain you have had on other occasions prior to surgery, please contact your primary physician for further recommendations or for an appointment to be examined at their office.    Q:  Why am I having headaches?  A:  Headaches can be caused by many things:  caffeine withdrawal, use of pain meds, dehydration, high blood pressure, lack of sleep, over-activity/exhaustion, flare-up of usual migraine headaches.  If you feel this is related to muscle tension (a band-like feeling around the head, or a pressure at the low-back of the head) you may try ice or heat to this area.  You may need to drink more fluids (try electrolyte drink like Gatorade), rest, or take your usual migraine medications.   **If your headaches do not resolve, worsen, are accompanied by other symptoms, or if your blood pressure is high, please call your primary physician for recommendation and/or examination.    Q:  I am unable to urinate.  What do I do?  A:  A small percentage of people can have difficulty urinating initially after surgery.  This includes being able to urinate only a very small amount at a time and feeling discomfort or pressure in the very low abdomen.  This is called  urinary retention ,  and is actually an urgent situation.  Proceed to your nearest Emergency department for evaluation (not an Urgent Care Center).  Sometimes the bladder does not work correctly after certain medications you receive during surgery, or related to certain procedures.  You may need to have a catheter placed until your bladder recovers.  When planning to go to an Emergency department, it may help to call the ER to let them know you are coming in for this problem after a surgery.  This may help you get in quicker to be evaluated.  **If you have symptoms of a urinary tract infection, please contact your primary physician for the proper evaluation and treatment.          If you have other questions, please call the office Monday thru Friday between 8am and 4:30pm to discuss with the nurse or physician assistant.  #[phone]    There is a surgeon ON CALL on weekday evenings and over the weekend in case of urgent need only, and may be contacted at the same number.    If you are having an emergency, call 911 or proceed to your nearest emergency department.      You received Toradol, an IV form of Ibuprofen (Motrin) at 8pm.  Do not take any Ibuprofen products until 02 am    Maximum acetaminophen (Tylenol) dose from all sources should not exceed 4 grams (4000 mg) per day.

## 2025-04-27 NOTE — ANESTHESIA POSTPROCEDURE EVALUATION
Patient: Bella Scott    Procedure: Procedure(s):  CHOLECYSTECTOMY, ROBOT-ASSISTED, LAPAROSCOPIC, USING DA SERINA XI       Anesthesia Type:  General    Note:  Disposition: Outpatient   Postop Pain Control: Uneventful            Sign Out: Well controlled pain   PONV: No   Neuro/Psych: Uneventful            Sign Out: Acceptable/Baseline neuro status   Airway/Respiratory: Uneventful            Sign Out: Acceptable/Baseline resp. status   CV/Hemodynamics: Uneventful            Sign Out: Acceptable CV status; No obvious hypovolemia; No obvious fluid overload   Other NRE:    DID A NON-ROUTINE EVENT OCCUR? No           Last vitals:  Vitals Value Taken Time   /88 04/26/25 2102   Temp     Pulse 71 04/26/25 2107   Resp 8 04/26/25 2107   SpO2 94 % 04/26/25 2107   Vitals shown include unfiled device data.    Electronically Signed By: Loan Sood MD  April 26, 2025  9:08 PM

## 2025-04-28 LAB
ATRIAL RATE - MUSE: 64 BPM
DIASTOLIC BLOOD PRESSURE - MUSE: NORMAL MMHG
INTERPRETATION ECG - MUSE: NORMAL
P AXIS - MUSE: 5 DEGREES
PR INTERVAL - MUSE: 126 MS
QRS DURATION - MUSE: 80 MS
QT - MUSE: 432 MS
QTC - MUSE: 445 MS
R AXIS - MUSE: 43 DEGREES
SYSTOLIC BLOOD PRESSURE - MUSE: NORMAL MMHG
T AXIS - MUSE: 46 DEGREES
VENTRICULAR RATE- MUSE: 64 BPM

## 2025-04-29 ENCOUNTER — OFFICE VISIT (OUTPATIENT)
Dept: ENDOCRINOLOGY | Facility: CLINIC | Age: 40
End: 2025-04-29
Payer: COMMERCIAL

## 2025-04-29 VITALS
OXYGEN SATURATION: 98 % | SYSTOLIC BLOOD PRESSURE: 124 MMHG | TEMPERATURE: 98.7 F | BODY MASS INDEX: 31.28 KG/M2 | HEART RATE: 84 BPM | WEIGHT: 206.4 LBS | DIASTOLIC BLOOD PRESSURE: 81 MMHG | RESPIRATION RATE: 16 BRPM | HEIGHT: 68 IN

## 2025-04-29 DIAGNOSIS — E66.811 CLASS 1 OBESITY DUE TO EXCESS CALORIES WITHOUT SERIOUS COMORBIDITY WITH BODY MASS INDEX (BMI) OF 32.0 TO 32.9 IN ADULT: ICD-10-CM

## 2025-04-29 DIAGNOSIS — E66.09 CLASS 1 OBESITY DUE TO EXCESS CALORIES WITHOUT SERIOUS COMORBIDITY WITH BODY MASS INDEX (BMI) OF 32.0 TO 32.9 IN ADULT: ICD-10-CM

## 2025-04-29 DIAGNOSIS — E03.4 HYPOTHYROIDISM DUE TO ACQUIRED ATROPHY OF THYROID: Primary | ICD-10-CM

## 2025-04-29 LAB
PATH REPORT.COMMENTS IMP SPEC: NORMAL
PATH REPORT.COMMENTS IMP SPEC: NORMAL
PATH REPORT.FINAL DX SPEC: NORMAL
PATH REPORT.GROSS SPEC: NORMAL
PATH REPORT.MICROSCOPIC SPEC OTHER STN: NORMAL
PATH REPORT.RELEVANT HX SPEC: NORMAL
PHOTO IMAGE: NORMAL
T4 FREE SERPL-MCNC: 1.26 NG/DL (ref 0.9–1.7)
TSH SERPL DL<=0.005 MIU/L-ACNC: 8.01 UIU/ML (ref 0.3–4.2)

## 2025-04-29 PROCEDURE — 88304 TISSUE EXAM BY PATHOLOGIST: CPT | Mod: 26 | Performed by: PATHOLOGY

## 2025-04-29 RX ORDER — METFORMIN HYDROCHLORIDE 500 MG/1
TABLET, EXTENDED RELEASE ORAL
Qty: 180 TABLET | Refills: 1 | Status: SHIPPED | OUTPATIENT
Start: 2025-04-29 | End: 2025-05-13

## 2025-04-29 NOTE — PATIENT INSTRUCTIONS
[facility]  Dr Domingo, Endocrinology Department    [address] [id number]  [place] [address]  Appointment [phone]  Fax: [phone]  [place]: Monday - Thursday

## 2025-04-29 NOTE — LETTER
4/29/2025      Bella Scott  [address]  [facility] [id number]      Dear Colleague,    Thank you for referring your patient, Bella Scott, to the [facility]. Please see a copy of my visit note below.    Assessment/Plan :   Hypothyroidism. Bella is doing well on 100 mcg of levothyroxine daily. She feels like her thyroid levels have really been stable. We reviewed the signs and symptoms of low and excess thyroid hormone. We also discussed her previous laboratory results. I do not see any reason to make adjustments to her current dose but I would like to repeat testing. We will add a TSH and FT4 to the labs that were recently drawn while in the hospital. I will contact her with the results.   Obesity. Bella had questions regarding metformin. She has not started the medication because she was not sure where the prescription was sent. She would like it sent to [facility]. We discussed how metformin can contribute to weight loss and I think it is a good medication. I will resend the prescription today and she will continue to follow-up with weight management.       I have independently reviewed and interpreted labs, imaging as indicated.      Chief complaint:  Bella is a 39 year old female who returns for follow-up of hypothyroidism.     I have reviewed Care Everywhere including [facility], [facility], [facility], [facility] , , [place] lab reports, imaging reports and provider notes as indicated.      HISTORY OF PRESENT ILLNESS  Bella is doing okay. She feels like her thyroid levels are stable. She continues to take 100 mcg of levothyroxine daily. She did undergo emergency cholecystectomy on Saturday, so she is not feeling 100% today, but most days, she feels good. She is also in the process of weaning off of the Zepbound. Her insurance will no longer cover the medication. She also doesn't like the way it made her feel. The weight management physician  is recommending she switch to metformin.     Bella was originally diagnosed with thyroid disease about 15 yrs ago. She had been struggling with fatigue and weight gain, when laboratory testing found her thyroid level to be off. She was started on levothyroxine and remains on levothyroxine today. She is not sure if she has ever felt 100%. Over the years, she has battled exhaustion, weight gain, and brain fog. She understands the importance of weight loss but she has not been able to lose weight through diet and exercise alone. She has used phentermine, with some success, in the past. She is in the process of weaning off Zepbound due to loss of insurance coverage.      Endocrine relevant labs are as follows:   Latest Reference Range & Units 03/04/24 09:52   Hemoglobin A1C 0.0 - 5.6 % 5.1      Latest Reference Range & Units 08/08/24 11:27   TSH 0.30 - 4.20 uIU/mL 0.31      Latest Reference Range & Units 08/08/24 11:27   T4 Free 0.90 - 1.70 ng/dL 1.14     REVIEW OF SYSTEMS    Endocrine: positive for thyroid disorder and obesity  Skin: negative  Eyes: negative for, visual blurring, redness, tearing  Ears/Nose/Throat: negative  Respiratory: No shortness of breath, dyspnea on exertion, cough, or hemoptysis  Cardiovascular: negative for, chest pain, dyspnea on exertion, lower extremity edema, and exercise intolerance  Gastrointestinal: positive for dyspepsia and abdominal pain, negative for, nausea, vomiting, constipation, and diarrhea  Genitourinary: negative for, nocturia, dysuria, frequency, and urgency  Musculoskeletal: negative for, muscular weakness, nocturnal cramping, and foot pain  Neurologic: negative for, local weakness, numbness or tingling of hands, and numbness or tingling of feet  Psychiatric: negative  Hematologic/Lymphatic/Immunologic: negative    Past Medical History  Past Medical History:   Diagnosis Date     Anemia, unspecified 3/24/2003    iron deficiney per pt     Contraception      Unspecified  hypothyroidism        Medications  Current Outpatient Medications   Medication Sig Dispense Refill     cetirizine (ZYRTEC) 10 MG tablet Take 1 tablet (10 mg) by mouth daily       levonorgestrel (MIRENA) 20 MCG/24HR IUD 1 each by Intrauterine route once       levothyroxine (SYNTHROID/LEVOTHROID) 100 MCG tablet TAKE 1 TABLET BY MOUTH EVERY DAY 90 tablet 1     metFORMIN (GLUCOPHAGE XR) 500 MG 24 hr tablet Take 1 tablet (500 mg) by mouth daily (with dinner) for 7 days, THEN 2 tablets (1,000 mg) daily (with dinner) for 7 days, THEN 3 tablets (1,500 mg) daily (with dinner). 270 tablet 1     Multiple Vitamin (MULTIVITAMIN ADULT PO) Take 1 tablet by mouth daily.       omeprazole (PRILOSEC) 20 MG DR capsule TAKE 1 CAPSULE BY MOUTH EVERY DAY 90 capsule 1     oxyCODONE (ROXICODONE) 5 MG tablet Take 1-2 tablets (5-10 mg) by mouth every 4 hours as needed for moderate to severe pain. 6 tablet 0     phentermine (ADIPEX-P) 37.5 MG tablet Take 1 tablet (37.5 mg) by mouth every morning (before breakfast). 30 tablet 3     tirzepatide-Weight Management (ZEPBOUND) 2.5 MG/0.5ML prefilled pen Inject 0.5 mLs (2.5 mg) subcutaneously every 7 days. 2 mL 0     tirzepatide-Weight Management (ZEPBOUND) 5 MG/0.5ML prefilled pen Inject 0.5 mLs (5 mg) subcutaneously every 7 days. 2 mL 2     venlafaxine (EFFEXOR XR) 37.5 MG 24 hr capsule TAKE 1 CAPSULE BY MOUTH EVERY DAY 30 capsule 5       Allergies  Allergies   Allergen Reactions     No Known Allergies          Family History  family history includes Cancer in her maternal grandmother; Diabetes in her maternal grandfather; Family History Negative in her brother and father; Heart Disease in her paternal grandfather; Other - See Comments in her paternal grandmother; Thyroid Disease in her mother and sister.    Social History  Social History     Tobacco Use     Smoking status: Never     Passive exposure: Never     Smokeless tobacco: Never     Tobacco comments:     no 2nd hand smoke at home   Vaping  "Use     Vaping status: Never Used   Substance Use Topics     Alcohol use: Yes     Alcohol/week: 0.0 standard drinks of alcohol     Comment: once a month     Drug use: No       Physical Exam  /81 (BP Location: Left arm, Patient Position: Chair, Cuff Size: Adult Large)   Pulse 84   Temp 98.7  F (37.1  C) (Tympanic)   Resp 16   Ht 1.727 m (5' 7.99\")   Wt 93.6 kg (206 lb 6.4 oz)   LMP 04/05/2025 (Approximate)   SpO2 98%   Breastfeeding No   BMI 31.39 kg/m    Body mass index is 31.39 kg/m .  GENERAL :  In no apparent distress  SKIN: Normal color, normal temperature, texture.  No hirsutism, alopecia or purple striae.     EYES: PERRL, EOMI, No scleral icterus,  No proptosis, conjunctival redness, stare, retraction  NECK: No visible masses. No palpable adenopathy, or masses. No carotid bruits.   THYROID:  Normal, nontender, smooth / firm texture,  no nodules, no Bruit   RESP: Lungs clear to auscultation bilaterally  CARDIAC: Regular rate and rhythm, normal S1 S2, without murmurs, rubs or gallops    NEURO: awake, alert, responds appropriately to questions.  Cranial nerves intact.   Moves all extremities; Gait normal.  No tremor of the outstretched hand.    EXTREMITIES: No clubbing, cyanosis or edema.              Again, thank you for allowing me to participate in the care of your patient.        Sincerely,        Nadya Harrison PA-C    Electronically signed"

## 2025-04-29 NOTE — PROGRESS NOTES
Assessment/Plan :   Hypothyroidism. Bella is doing well on 100 mcg of levothyroxine daily. She feels like her thyroid levels have really been stable. We reviewed the signs and symptoms of low and excess thyroid hormone. We also discussed her previous laboratory results. I do not see any reason to make adjustments to her current dose but I would like to repeat testing. We will add a TSH and FT4 to the labs that were recently drawn while in the hospital. I will contact her with the results.   Obesity. Bella had questions regarding metformin. She has not started the medication because she was not sure where the prescription was sent. She would like it sent to [facility]. We discussed how metformin can contribute to weight loss and I think it is a good medication. I will resend the prescription today and she will continue to follow-up with weight management.       I have independently reviewed and interpreted labs, imaging as indicated.      Chief complaint:  Bella is a 39 year old female who returns for follow-up of hypothyroidism.     I have reviewed Care Everywhere including [facility], [facility], [facility], [facility], [facility], [facility] , [facility], [place] lab reports, imaging reports and provider notes as indicated.      HISTORY OF PRESENT ILLNESS  Bella is doing okay. She feels like her thyroid levels are stable. She continues to take 100 mcg of levothyroxine daily. She did undergo emergency cholecystectomy on Saturday, so she is not feeling 100% today, but most days, she feels good. She is also in the process of weaning off of the Zepbound. Her insurance will no longer cover the medication. She also doesn't like the way it made her feel. The weight management physician is recommending she switch to metformin.     Bella was originally diagnosed with thyroid disease about 15 yrs ago. She had been struggling with fatigue and weight gain, when laboratory testing found her thyroid level to be off. She was  started on levothyroxine and remains on levothyroxine today. She is not sure if she has ever felt 100%. Over the years, she has battled exhaustion, weight gain, and brain fog. She understands the importance of weight loss but she has not been able to lose weight through diet and exercise alone. She has used phentermine, with some success, in the past. She is in the process of weaning off Zepbound due to loss of insurance coverage.      Endocrine relevant labs are as follows:   Latest Reference Range & Units 03/04/24 09:52   Hemoglobin A1C 0.0 - 5.6 % 5.1      Latest Reference Range & Units 08/08/24 11:27   TSH 0.30 - 4.20 uIU/mL 0.31      Latest Reference Range & Units 08/08/24 11:27   T4 Free 0.90 - 1.70 ng/dL 1.14     REVIEW OF SYSTEMS    Endocrine: positive for thyroid disorder and obesity  Skin: negative  Eyes: negative for, visual blurring, redness, tearing  Ears/Nose/Throat: negative  Respiratory: No shortness of breath, dyspnea on exertion, cough, or hemoptysis  Cardiovascular: negative for, chest pain, dyspnea on exertion, lower extremity edema, and exercise intolerance  Gastrointestinal: positive for dyspepsia and abdominal pain, negative for, nausea, vomiting, constipation, and diarrhea  Genitourinary: negative for, nocturia, dysuria, frequency, and urgency  Musculoskeletal: negative for, muscular weakness, nocturnal cramping, and foot pain  Neurologic: negative for, local weakness, numbness or tingling of hands, and numbness or tingling of feet  Psychiatric: negative  Hematologic/Lymphatic/Immunologic: negative    Past Medical History  Past Medical History:   Diagnosis Date    Anemia, unspecified 3/24/2003    iron deficiney per pt    Contraception     Unspecified hypothyroidism        Medications  Current Outpatient Medications   Medication Sig Dispense Refill    cetirizine (ZYRTEC) 10 MG tablet Take 1 tablet (10 mg) by mouth daily      levonorgestrel (MIRENA) 20 MCG/24HR IUD 1 each by Intrauterine route  once      levothyroxine (SYNTHROID/LEVOTHROID) 100 MCG tablet TAKE 1 TABLET BY MOUTH EVERY DAY 90 tablet 1    metFORMIN (GLUCOPHAGE XR) 500 MG 24 hr tablet Take 1 tablet (500 mg) by mouth daily (with dinner) for 7 days, THEN 2 tablets (1,000 mg) daily (with dinner) for 7 days, THEN 3 tablets (1,500 mg) daily (with dinner). 270 tablet 1    Multiple Vitamin (MULTIVITAMIN ADULT PO) Take 1 tablet by mouth daily.      omeprazole (PRILOSEC) 20 MG DR capsule TAKE 1 CAPSULE BY MOUTH EVERY DAY 90 capsule 1    oxyCODONE (ROXICODONE) 5 MG tablet Take 1-2 tablets (5-10 mg) by mouth every 4 hours as needed for moderate to severe pain. 6 tablet 0    phentermine (ADIPEX-P) 37.5 MG tablet Take 1 tablet (37.5 mg) by mouth every morning (before breakfast). 30 tablet 3    tirzepatide-Weight Management (ZEPBOUND) 2.5 MG/0.5ML prefilled pen Inject 0.5 mLs (2.5 mg) subcutaneously every 7 days. 2 mL 0    tirzepatide-Weight Management (ZEPBOUND) 5 MG/0.5ML prefilled pen Inject 0.5 mLs (5 mg) subcutaneously every 7 days. 2 mL 2    venlafaxine (EFFEXOR XR) 37.5 MG 24 hr capsule TAKE 1 CAPSULE BY MOUTH EVERY DAY 30 capsule 5       Allergies  Allergies   Allergen Reactions    No Known Allergies          Family History  family history includes Cancer in her maternal grandmother; Diabetes in her maternal grandfather; Family History Negative in her brother and father; Heart Disease in her paternal grandfather; Other - See Comments in her paternal grandmother; Thyroid Disease in her mother and sister.    Social History  Social History     Tobacco Use    Smoking status: Never     Passive exposure: Never    Smokeless tobacco: Never    Tobacco comments:     no 2nd hand smoke at home   Vaping Use    Vaping status: Never Used   Substance Use Topics    Alcohol use: Yes     Alcohol/week: 0.0 standard drinks of alcohol     Comment: once a month    Drug use: No       Physical Exam  /81 (BP Location: Left arm, Patient Position: Chair, Cuff Size: Adult  "Large)   Pulse 84   Temp 98.7  F (37.1  C) (Tympanic)   Resp 16   Ht 1.727 m (5' 7.99\")   Wt 93.6 kg (206 lb 6.4 oz)   LMP 04/05/2025 (Approximate)   SpO2 98%   Breastfeeding No   BMI 31.39 kg/m    Body mass index is 31.39 kg/m .  GENERAL :  In no apparent distress  SKIN: Normal color, normal temperature, texture.  No hirsutism, alopecia or purple striae.     EYES: PERRL, EOMI, No scleral icterus,  No proptosis, conjunctival redness, stare, retraction  NECK: No visible masses. No palpable adenopathy, or masses. No carotid bruits.   THYROID:  Normal, nontender, smooth / firm texture,  no nodules, no Bruit   RESP: Lungs clear to auscultation bilaterally  CARDIAC: Regular rate and rhythm, normal S1 S2, without murmurs, rubs or gallops    NEURO: awake, alert, responds appropriately to questions.  Cranial nerves intact.   Moves all extremities; Gait normal.  No tremor of the outstretched hand.    EXTREMITIES: No clubbing, cyanosis or edema.            "

## 2025-04-30 DIAGNOSIS — E03.4 HYPOTHYROIDISM DUE TO ACQUIRED ATROPHY OF THYROID: ICD-10-CM

## 2025-04-30 RX ORDER — LEVOTHYROXINE SODIUM 112 UG/1
112 TABLET ORAL DAILY
Qty: 90 TABLET | Refills: 1 | Status: SHIPPED | OUTPATIENT
Start: 2025-04-30

## 2025-05-20 ENCOUNTER — TELEPHONE (OUTPATIENT)
Dept: SURGERY | Facility: CLINIC | Age: 40
End: 2025-05-20
Payer: COMMERCIAL

## 2025-05-20 NOTE — TELEPHONE ENCOUNTER
Attempted to call patient for post op check.  No answer.  Message was left for patient to call back if they had any questions of concerns.     Jasmine Kwan PA-C

## 2025-06-21 ENCOUNTER — HEALTH MAINTENANCE LETTER (OUTPATIENT)
Age: 40
End: 2025-06-21

## 2025-07-27 DIAGNOSIS — E66.812 CLASS 2 SEVERE OBESITY DUE TO EXCESS CALORIES WITH SERIOUS COMORBIDITY AND BODY MASS INDEX (BMI) OF 35.0 TO 35.9 IN ADULT (H): ICD-10-CM

## 2025-07-27 DIAGNOSIS — E66.01 CLASS 2 SEVERE OBESITY DUE TO EXCESS CALORIES WITH SERIOUS COMORBIDITY AND BODY MASS INDEX (BMI) OF 35.0 TO 35.9 IN ADULT (H): ICD-10-CM

## 2025-07-28 ENCOUNTER — MYC REFILL (OUTPATIENT)
Dept: ENDOCRINOLOGY | Facility: CLINIC | Age: 40
End: 2025-07-28
Payer: COMMERCIAL

## 2025-07-28 DIAGNOSIS — E66.01 CLASS 2 SEVERE OBESITY DUE TO EXCESS CALORIES WITH SERIOUS COMORBIDITY AND BODY MASS INDEX (BMI) OF 35.0 TO 35.9 IN ADULT (H): ICD-10-CM

## 2025-07-28 DIAGNOSIS — E66.812 CLASS 2 SEVERE OBESITY DUE TO EXCESS CALORIES WITH SERIOUS COMORBIDITY AND BODY MASS INDEX (BMI) OF 35.0 TO 35.9 IN ADULT (H): ICD-10-CM

## 2025-07-29 ENCOUNTER — MYC MEDICAL ADVICE (OUTPATIENT)
Dept: ENDOCRINOLOGY | Facility: CLINIC | Age: 40
End: 2025-07-29
Payer: COMMERCIAL

## 2025-07-29 DIAGNOSIS — E66.01 CLASS 2 SEVERE OBESITY DUE TO EXCESS CALORIES WITH SERIOUS COMORBIDITY AND BODY MASS INDEX (BMI) OF 35.0 TO 35.9 IN ADULT (H): ICD-10-CM

## 2025-07-29 DIAGNOSIS — E66.812 CLASS 2 SEVERE OBESITY DUE TO EXCESS CALORIES WITH SERIOUS COMORBIDITY AND BODY MASS INDEX (BMI) OF 35.0 TO 35.9 IN ADULT (H): ICD-10-CM

## 2025-07-29 RX ORDER — PHENTERMINE HYDROCHLORIDE 37.5 MG/1
37.5 TABLET ORAL
Qty: 30 TABLET | Refills: 3 | OUTPATIENT
Start: 2025-07-29

## 2025-07-29 RX ORDER — PHENTERMINE HYDROCHLORIDE 37.5 MG/1
37.5 TABLET ORAL
Qty: 30 TABLET | Refills: 1 | Status: SHIPPED | OUTPATIENT
Start: 2025-07-29

## 2025-07-29 RX ORDER — PHENTERMINE HYDROCHLORIDE 37.5 MG/1
TABLET ORAL
Qty: 30 TABLET | Refills: 3 | OUTPATIENT
Start: 2025-07-29

## 2025-07-29 NOTE — TELEPHONE ENCOUNTER
Pt last seen 12/12/24.  Needs appt.  Will refuse per clinic protocol.  Emma Mccracken MS, RD, RN

## 2025-07-29 NOTE — TELEPHONE ENCOUNTER
Per notes from 4/29/25:   I will resend the prescription today and she will continue to follow-up with weight management.     Pt's weight management to manage.

## 2025-07-31 ENCOUNTER — LAB (OUTPATIENT)
Dept: LAB | Facility: CLINIC | Age: 40
End: 2025-07-31
Payer: COMMERCIAL

## 2025-07-31 DIAGNOSIS — E03.4 HYPOTHYROIDISM DUE TO ACQUIRED ATROPHY OF THYROID: ICD-10-CM

## 2025-08-25 ENCOUNTER — OFFICE VISIT (OUTPATIENT)
Dept: ENDOCRINOLOGY | Facility: CLINIC | Age: 40
End: 2025-08-25
Payer: COMMERCIAL

## 2025-08-25 VITALS
DIASTOLIC BLOOD PRESSURE: 79 MMHG | WEIGHT: 216.1 LBS | HEIGHT: 68 IN | OXYGEN SATURATION: 99 % | RESPIRATION RATE: 16 BRPM | HEART RATE: 84 BPM | SYSTOLIC BLOOD PRESSURE: 129 MMHG | BODY MASS INDEX: 32.75 KG/M2 | TEMPERATURE: 98.6 F

## 2025-08-25 DIAGNOSIS — E66.812 CLASS 2 SEVERE OBESITY DUE TO EXCESS CALORIES WITH SERIOUS COMORBIDITY AND BODY MASS INDEX (BMI) OF 35.0 TO 35.9 IN ADULT (H): ICD-10-CM

## 2025-08-25 DIAGNOSIS — R73.01 IMPAIRED FASTING GLUCOSE: Primary | ICD-10-CM

## 2025-08-25 DIAGNOSIS — E66.01 CLASS 2 SEVERE OBESITY DUE TO EXCESS CALORIES WITH SERIOUS COMORBIDITY AND BODY MASS INDEX (BMI) OF 35.0 TO 35.9 IN ADULT (H): ICD-10-CM

## 2025-08-25 LAB
ALBUMIN SERPL BCG-MCNC: 4.6 G/DL (ref 3.5–5.2)
ALP SERPL-CCNC: 82 U/L (ref 40–150)
ALT SERPL W P-5'-P-CCNC: 26 U/L (ref 0–50)
ANION GAP SERPL CALCULATED.3IONS-SCNC: 14 MMOL/L (ref 7–15)
AST SERPL W P-5'-P-CCNC: 34 U/L (ref 0–45)
BILIRUB SERPL-MCNC: 0.4 MG/DL
BUN SERPL-MCNC: 11.4 MG/DL (ref 6–20)
CALCIUM SERPL-MCNC: 9.5 MG/DL (ref 8.8–10.4)
CHLORIDE SERPL-SCNC: 101 MMOL/L (ref 98–107)
CREAT SERPL-MCNC: 0.77 MG/DL (ref 0.51–0.95)
EGFRCR SERPLBLD CKD-EPI 2021: >90 ML/MIN/1.73M2
EST. AVERAGE GLUCOSE BLD GHB EST-MCNC: 100 MG/DL
GLUCOSE SERPL-MCNC: 86 MG/DL (ref 70–99)
HBA1C MFR BLD: 5.1 % (ref 0–5.6)
HCO3 SERPL-SCNC: 24 MMOL/L (ref 22–29)
POTASSIUM SERPL-SCNC: 4 MMOL/L (ref 3.4–5.3)
PROT SERPL-MCNC: 7.5 G/DL (ref 6.4–8.3)
SODIUM SERPL-SCNC: 139 MMOL/L (ref 135–145)

## 2025-08-25 PROCEDURE — 3074F SYST BP LT 130 MM HG: CPT | Performed by: PHYSICIAN ASSISTANT

## 2025-08-25 PROCEDURE — 99214 OFFICE O/P EST MOD 30 MIN: CPT | Performed by: PHYSICIAN ASSISTANT

## 2025-08-25 PROCEDURE — 80053 COMPREHEN METABOLIC PANEL: CPT | Performed by: PHYSICIAN ASSISTANT

## 2025-08-25 PROCEDURE — 3078F DIAST BP <80 MM HG: CPT | Performed by: PHYSICIAN ASSISTANT

## 2025-08-25 PROCEDURE — 36415 COLL VENOUS BLD VENIPUNCTURE: CPT | Performed by: PHYSICIAN ASSISTANT

## 2025-08-25 PROCEDURE — 83036 HEMOGLOBIN GLYCOSYLATED A1C: CPT | Performed by: PHYSICIAN ASSISTANT

## 2025-08-25 PROCEDURE — 3044F HG A1C LEVEL LT 7.0%: CPT | Performed by: PHYSICIAN ASSISTANT

## 2025-08-25 RX ORDER — PHENTERMINE HYDROCHLORIDE 37.5 MG/1
37.5 TABLET ORAL
Qty: 30 TABLET | Refills: 1 | Status: SHIPPED | OUTPATIENT
Start: 2025-08-25

## (undated) DEVICE — GLOVE PROTEXIS W/NEU-THERA 6.0  2D73TE60

## (undated) DEVICE — DAVINCI XI OBTURATOR BLADELESS 8MM 470359

## (undated) DEVICE — ESU ELEC BLADE 2.75" COATED/INSULATED E1455

## (undated) DEVICE — LINEN ORTHO ACL PACK 5447

## (undated) DEVICE — ENDO POUCH UNIVERSAL RETRIEVAL SYSTEM INZII 5MM CD003

## (undated) DEVICE — BLADE KNIFE SURG 11 371111

## (undated) DEVICE — SOLUTION WATER 1000ML R5000-01

## (undated) DEVICE — DAVINCI XI DRAPE ARM 470015

## (undated) DEVICE — LUBRICANT INST ELECTROLUBE EL101

## (undated) DEVICE — LINEN FULL SHEET 5511

## (undated) DEVICE — SU STRATAFIX PDS PLUS 3-0 SPIRAL SH 15CM SXPP1B420

## (undated) DEVICE — DRAPE IOBAN INCISE 23X17" 6650EZ

## (undated) DEVICE — ENDO TROCAR FIRST ENTRY KII FIOS Z-THRD 05X100MM CTF03

## (undated) DEVICE — CLIP ENDO HEMO-LOC GREEN MED/LG 544230

## (undated) DEVICE — GLOVE PROTEXIS BLUE W/NEU-THERA 6.5  2D73EB65

## (undated) DEVICE — Device

## (undated) DEVICE — ESU PENCIL W/HOLSTER E2350H

## (undated) DEVICE — EVAC SYSTEM CLEAR FLOW SC082500

## (undated) DEVICE — ESU GROUND PAD ADULT W/CORD E7507

## (undated) DEVICE — SU VICRYL+ 4-0 UNDYED PS-2 VCP496ZH

## (undated) DEVICE — DAVINCI XI SEAL UNIVERSAL 5-12MM 470500

## (undated) RX ORDER — KETOROLAC TROMETHAMINE 30 MG/ML
INJECTION, SOLUTION INTRAMUSCULAR; INTRAVENOUS
Status: DISPENSED
Start: 2025-04-26

## (undated) RX ORDER — DEXAMETHASONE SODIUM PHOSPHATE 4 MG/ML
INJECTION, SOLUTION INTRA-ARTICULAR; INTRALESIONAL; INTRAMUSCULAR; INTRAVENOUS; SOFT TISSUE
Status: DISPENSED
Start: 2025-04-26

## (undated) RX ORDER — PROPOFOL 10 MG/ML
INJECTION, EMULSION INTRAVENOUS
Status: DISPENSED
Start: 2025-04-26

## (undated) RX ORDER — FENTANYL CITRATE 50 UG/ML
INJECTION, SOLUTION INTRAMUSCULAR; INTRAVENOUS
Status: DISPENSED
Start: 2025-04-26

## (undated) RX ORDER — ONDANSETRON 2 MG/ML
INJECTION INTRAMUSCULAR; INTRAVENOUS
Status: DISPENSED
Start: 2025-04-26

## (undated) RX ORDER — BUPIVACAINE HYDROCHLORIDE AND EPINEPHRINE 5; 5 MG/ML; UG/ML
INJECTION, SOLUTION EPIDURAL; INTRACAUDAL; PERINEURAL
Status: DISPENSED
Start: 2025-04-26

## (undated) RX ORDER — LIDOCAINE HYDROCHLORIDE 10 MG/ML
INJECTION, SOLUTION EPIDURAL; INFILTRATION; INTRACAUDAL; PERINEURAL
Status: DISPENSED
Start: 2025-04-26

## (undated) RX ORDER — CEFAZOLIN SODIUM/WATER 2 G/20 ML
SYRINGE (ML) INTRAVENOUS
Status: DISPENSED
Start: 2025-04-26

## (undated) RX ORDER — INDOCYANINE GREEN AND WATER 25 MG
KIT INJECTION
Status: DISPENSED
Start: 2025-04-26